# Patient Record
Sex: FEMALE | Race: WHITE | NOT HISPANIC OR LATINO | URBAN - METROPOLITAN AREA
[De-identification: names, ages, dates, MRNs, and addresses within clinical notes are randomized per-mention and may not be internally consistent; named-entity substitution may affect disease eponyms.]

---

## 2017-01-10 ENCOUNTER — COMMUNICATION - HEALTHEAST (OUTPATIENT)
Dept: INTERNAL MEDICINE | Facility: CLINIC | Age: 82
End: 2017-01-10

## 2017-01-10 DIAGNOSIS — R19.7 DIARRHEA: ICD-10-CM

## 2017-01-12 ENCOUNTER — COMMUNICATION - HEALTHEAST (OUTPATIENT)
Dept: INTERNAL MEDICINE | Facility: CLINIC | Age: 82
End: 2017-01-12

## 2017-01-18 ENCOUNTER — RECORDS - HEALTHEAST (OUTPATIENT)
Dept: ADMINISTRATIVE | Facility: OTHER | Age: 82
End: 2017-01-18

## 2017-01-31 ENCOUNTER — COMMUNICATION - HEALTHEAST (OUTPATIENT)
Dept: INTERNAL MEDICINE | Facility: CLINIC | Age: 82
End: 2017-01-31

## 2017-02-15 ENCOUNTER — OFFICE VISIT - HEALTHEAST (OUTPATIENT)
Dept: INTERNAL MEDICINE | Facility: CLINIC | Age: 82
End: 2017-02-15

## 2017-02-15 DIAGNOSIS — E78.5 HYPERLIPIDEMIA: ICD-10-CM

## 2017-02-15 DIAGNOSIS — M43.6 NECK STIFFNESS: ICD-10-CM

## 2017-02-15 DIAGNOSIS — I10 ESSENTIAL HYPERTENSION: ICD-10-CM

## 2017-02-15 DIAGNOSIS — M48.061 SPINAL STENOSIS OF LUMBAR REGION WITH RADICULOPATHY: ICD-10-CM

## 2017-02-15 DIAGNOSIS — R29.898 LOWER EXTREMITY WEAKNESS: ICD-10-CM

## 2017-02-15 DIAGNOSIS — I25.10 CORONARY ATHEROSCLEROSIS: ICD-10-CM

## 2017-02-15 DIAGNOSIS — M54.16 SPINAL STENOSIS OF LUMBAR REGION WITH RADICULOPATHY: ICD-10-CM

## 2017-02-15 LAB
CHOLEST SERPL-MCNC: 204 MG/DL
FASTING STATUS PATIENT QL REPORTED: YES
HDLC SERPL-MCNC: 76 MG/DL
LDLC SERPL CALC-MCNC: 116 MG/DL
TRIGL SERPL-MCNC: 58 MG/DL

## 2017-02-15 ASSESSMENT — MIFFLIN-ST. JEOR: SCORE: 1128.28

## 2017-02-16 ENCOUNTER — AMBULATORY - HEALTHEAST (OUTPATIENT)
Dept: INTERNAL MEDICINE | Facility: CLINIC | Age: 82
End: 2017-02-16

## 2017-02-16 ENCOUNTER — COMMUNICATION - HEALTHEAST (OUTPATIENT)
Dept: INTERNAL MEDICINE | Facility: CLINIC | Age: 82
End: 2017-02-16

## 2017-02-16 DIAGNOSIS — E87.6 HYPOKALEMIA: ICD-10-CM

## 2017-02-17 ENCOUNTER — COMMUNICATION - HEALTHEAST (OUTPATIENT)
Dept: PHYSICAL MEDICINE AND REHAB | Facility: CLINIC | Age: 82
End: 2017-02-17

## 2017-03-02 ENCOUNTER — HOSPITAL ENCOUNTER (OUTPATIENT)
Dept: PHYSICAL MEDICINE AND REHAB | Facility: CLINIC | Age: 82
Discharge: HOME | End: 2017-03-02
Attending: PHYSICIAN ASSISTANT

## 2017-03-02 DIAGNOSIS — M54.16 LUMBAR RADICULITIS: ICD-10-CM

## 2017-03-29 ENCOUNTER — COMMUNICATION - HEALTHEAST (OUTPATIENT)
Dept: PHYSICAL MEDICINE AND REHAB | Facility: CLINIC | Age: 82
End: 2017-03-29

## 2017-04-09 ENCOUNTER — COMMUNICATION - HEALTHEAST (OUTPATIENT)
Dept: PHYSICAL MEDICINE AND REHAB | Facility: CLINIC | Age: 82
End: 2017-04-09

## 2017-04-10 ENCOUNTER — AMBULATORY - HEALTHEAST (OUTPATIENT)
Dept: PHYSICAL MEDICINE AND REHAB | Facility: CLINIC | Age: 82
End: 2017-04-10

## 2017-04-10 DIAGNOSIS — M54.16 LUMBAR RADICULITIS: ICD-10-CM

## 2017-04-14 ENCOUNTER — AMBULATORY - HEALTHEAST (OUTPATIENT)
Dept: PHYSICAL MEDICINE AND REHAB | Facility: CLINIC | Age: 82
End: 2017-04-14

## 2017-04-16 ENCOUNTER — COMMUNICATION - HEALTHEAST (OUTPATIENT)
Dept: PHYSICAL MEDICINE AND REHAB | Facility: CLINIC | Age: 82
End: 2017-04-16

## 2017-04-17 ENCOUNTER — HOSPITAL ENCOUNTER (OUTPATIENT)
Dept: PHYSICAL MEDICINE AND REHAB | Facility: CLINIC | Age: 82
Discharge: HOME | End: 2017-04-17
Attending: PHYSICIAN ASSISTANT

## 2017-04-17 DIAGNOSIS — M54.16 LUMBAR RADICULITIS: ICD-10-CM

## 2017-04-17 ASSESSMENT — MIFFLIN-ST. JEOR: SCORE: 1131.24

## 2017-05-03 ENCOUNTER — OFFICE VISIT - HEALTHEAST (OUTPATIENT)
Dept: CARDIOLOGY | Facility: CLINIC | Age: 82
End: 2017-05-03

## 2017-05-03 DIAGNOSIS — E78.00 HYPERCHOLESTEREMIA: ICD-10-CM

## 2017-05-03 DIAGNOSIS — I25.83 CORONARY ATHEROSCLEROSIS DUE TO LIPID RICH PLAQUE: ICD-10-CM

## 2017-05-03 DIAGNOSIS — M48.061 SPINAL STENOSIS OF LUMBAR REGION WITH RADICULOPATHY: ICD-10-CM

## 2017-05-03 DIAGNOSIS — I10 ESSENTIAL HYPERTENSION WITH GOAL BLOOD PRESSURE LESS THAN 140/90: ICD-10-CM

## 2017-05-03 DIAGNOSIS — I47.10 PAROXYSMAL SUPRAVENTRICULAR TACHYCARDIA (H): ICD-10-CM

## 2017-05-03 DIAGNOSIS — M54.16 SPINAL STENOSIS OF LUMBAR REGION WITH RADICULOPATHY: ICD-10-CM

## 2017-05-03 LAB
ATRIAL RATE - MUSE: 85 BPM
DIASTOLIC BLOOD PRESSURE - MUSE: NORMAL MMHG
INTERPRETATION ECG - MUSE: NORMAL
P AXIS - MUSE: 46 DEGREES
PR INTERVAL - MUSE: 244 MS
QRS DURATION - MUSE: 142 MS
QT - MUSE: 404 MS
QTC - MUSE: 480 MS
R AXIS - MUSE: -57 DEGREES
SYSTOLIC BLOOD PRESSURE - MUSE: NORMAL MMHG
T AXIS - MUSE: 98 DEGREES
VENTRICULAR RATE- MUSE: 85 BPM

## 2017-05-03 ASSESSMENT — MIFFLIN-ST. JEOR: SCORE: 1143.03

## 2017-05-05 ENCOUNTER — COMMUNICATION - HEALTHEAST (OUTPATIENT)
Dept: NEUROSURGERY | Facility: CLINIC | Age: 82
End: 2017-05-05

## 2017-05-05 ENCOUNTER — COMMUNICATION - HEALTHEAST (OUTPATIENT)
Dept: PHYSICAL MEDICINE AND REHAB | Facility: CLINIC | Age: 82
End: 2017-05-05

## 2017-05-07 ENCOUNTER — COMMUNICATION - HEALTHEAST (OUTPATIENT)
Dept: INTERNAL MEDICINE | Facility: CLINIC | Age: 82
End: 2017-05-07

## 2017-05-07 DIAGNOSIS — I10 HTN (HYPERTENSION): ICD-10-CM

## 2017-05-09 ENCOUNTER — COMMUNICATION - HEALTHEAST (OUTPATIENT)
Dept: NEUROSURGERY | Facility: CLINIC | Age: 82
End: 2017-05-09

## 2017-05-15 ENCOUNTER — OFFICE VISIT - HEALTHEAST (OUTPATIENT)
Dept: NEUROSURGERY | Facility: CLINIC | Age: 82
End: 2017-05-15

## 2017-05-15 ENCOUNTER — AMBULATORY - HEALTHEAST (OUTPATIENT)
Dept: NEUROSURGERY | Facility: CLINIC | Age: 82
End: 2017-05-15

## 2017-05-15 DIAGNOSIS — M54.10 RADICULAR SYNDROME OF LEFT LEG: ICD-10-CM

## 2017-05-15 ASSESSMENT — MIFFLIN-ST. JEOR: SCORE: 1143.03

## 2017-05-19 ENCOUNTER — OFFICE VISIT - HEALTHEAST (OUTPATIENT)
Dept: INTERNAL MEDICINE | Facility: CLINIC | Age: 82
End: 2017-05-19

## 2017-05-19 ENCOUNTER — COMMUNICATION - HEALTHEAST (OUTPATIENT)
Dept: INTERNAL MEDICINE | Facility: CLINIC | Age: 82
End: 2017-05-19

## 2017-05-19 DIAGNOSIS — M54.16 SPINAL STENOSIS OF LUMBAR REGION WITH RADICULOPATHY: ICD-10-CM

## 2017-05-19 DIAGNOSIS — M89.9 DISORDER OF BONE AND CARTILAGE: ICD-10-CM

## 2017-05-19 DIAGNOSIS — E78.00 HYPERCHOLESTEREMIA: ICD-10-CM

## 2017-05-19 DIAGNOSIS — N32.81 OAB (OVERACTIVE BLADDER): ICD-10-CM

## 2017-05-19 DIAGNOSIS — M94.9 DISORDER OF BONE AND CARTILAGE: ICD-10-CM

## 2017-05-19 DIAGNOSIS — M48.061 SPINAL STENOSIS OF LUMBAR REGION WITH RADICULOPATHY: ICD-10-CM

## 2017-05-19 DIAGNOSIS — I10 ESSENTIAL HYPERTENSION WITH GOAL BLOOD PRESSURE LESS THAN 140/90: ICD-10-CM

## 2017-05-19 LAB
CHOLEST SERPL-MCNC: 224 MG/DL
FASTING STATUS PATIENT QL REPORTED: YES
HDLC SERPL-MCNC: 82 MG/DL
LDLC SERPL CALC-MCNC: 131 MG/DL
TRIGL SERPL-MCNC: 56 MG/DL

## 2017-05-19 ASSESSMENT — MIFFLIN-ST. JEOR: SCORE: 1129.42

## 2017-05-23 ENCOUNTER — COMMUNICATION - HEALTHEAST (OUTPATIENT)
Dept: INTERNAL MEDICINE | Facility: CLINIC | Age: 82
End: 2017-05-23

## 2017-05-24 ENCOUNTER — AMBULATORY - HEALTHEAST (OUTPATIENT)
Dept: NEUROSURGERY | Facility: CLINIC | Age: 82
End: 2017-05-24

## 2017-05-30 ENCOUNTER — COMMUNICATION - HEALTHEAST (OUTPATIENT)
Dept: INTERNAL MEDICINE | Facility: CLINIC | Age: 82
End: 2017-05-30

## 2017-05-30 DIAGNOSIS — N32.81 OVERACTIVE BLADDER: ICD-10-CM

## 2017-05-31 ENCOUNTER — HOSPITAL ENCOUNTER (OUTPATIENT)
Dept: MRI IMAGING | Facility: CLINIC | Age: 82
Discharge: HOME | End: 2017-05-31
Attending: NEUROLOGICAL SURGERY

## 2017-05-31 DIAGNOSIS — M54.10 RADICULAR SYNDROME OF LEFT LEG: ICD-10-CM

## 2017-06-06 ENCOUNTER — RECORDS - HEALTHEAST (OUTPATIENT)
Dept: BONE DENSITY | Facility: CLINIC | Age: 82
End: 2017-06-06

## 2017-06-06 ENCOUNTER — RECORDS - HEALTHEAST (OUTPATIENT)
Dept: ADMINISTRATIVE | Facility: OTHER | Age: 82
End: 2017-06-06

## 2017-06-06 DIAGNOSIS — M94.9 DISORDER OF CARTILAGE, UNSPECIFIED: ICD-10-CM

## 2017-06-06 DIAGNOSIS — M89.9 DISORDER OF BONE, UNSPECIFIED: ICD-10-CM

## 2017-06-08 ENCOUNTER — COMMUNICATION - HEALTHEAST (OUTPATIENT)
Dept: INTERNAL MEDICINE | Facility: CLINIC | Age: 82
End: 2017-06-08

## 2017-06-12 ENCOUNTER — OFFICE VISIT - HEALTHEAST (OUTPATIENT)
Dept: NEUROSURGERY | Facility: CLINIC | Age: 82
End: 2017-06-12

## 2017-06-12 ENCOUNTER — AMBULATORY - HEALTHEAST (OUTPATIENT)
Dept: NEUROSURGERY | Facility: CLINIC | Age: 82
End: 2017-06-12

## 2017-06-12 DIAGNOSIS — S34.21XS INJURY OF SPINAL NERVE ROOT AT L5 LEVEL, SEQUELA: ICD-10-CM

## 2017-06-13 ENCOUNTER — COMMUNICATION - HEALTHEAST (OUTPATIENT)
Dept: NEUROSURGERY | Facility: CLINIC | Age: 82
End: 2017-06-13

## 2017-06-13 ENCOUNTER — COMMUNICATION - HEALTHEAST (OUTPATIENT)
Dept: INTERNAL MEDICINE | Facility: CLINIC | Age: 82
End: 2017-06-13

## 2017-06-14 ENCOUNTER — AMBULATORY - HEALTHEAST (OUTPATIENT)
Dept: NEUROSURGERY | Facility: CLINIC | Age: 82
End: 2017-06-14

## 2017-06-14 DIAGNOSIS — M54.9 BACK PAIN: ICD-10-CM

## 2017-06-16 ENCOUNTER — HOSPITAL ENCOUNTER (OUTPATIENT)
Dept: RADIOLOGY | Facility: CLINIC | Age: 82
Discharge: HOME | End: 2017-06-16
Attending: NEUROLOGICAL SURGERY

## 2017-06-16 DIAGNOSIS — M54.9 BACK PAIN: ICD-10-CM

## 2017-06-19 ENCOUNTER — AMBULATORY - HEALTHEAST (OUTPATIENT)
Dept: PHYSICAL MEDICINE AND REHAB | Facility: CLINIC | Age: 82
End: 2017-06-19

## 2017-06-20 ENCOUNTER — OFFICE VISIT - HEALTHEAST (OUTPATIENT)
Dept: NEUROSURGERY | Facility: CLINIC | Age: 82
End: 2017-06-20

## 2017-06-20 ENCOUNTER — AMBULATORY - HEALTHEAST (OUTPATIENT)
Dept: NEUROSURGERY | Facility: CLINIC | Age: 82
End: 2017-06-20

## 2017-06-20 ENCOUNTER — COMMUNICATION - HEALTHEAST (OUTPATIENT)
Dept: NEUROSURGERY | Facility: CLINIC | Age: 82
End: 2017-06-20

## 2017-06-20 DIAGNOSIS — M54.16 LEFT LUMBAR RADICULOPATHY: ICD-10-CM

## 2017-06-20 DIAGNOSIS — M41.55 OTHER SECONDARY SCOLIOSIS, THORACOLUMBAR REGION: ICD-10-CM

## 2017-06-20 ASSESSMENT — MIFFLIN-ST. JEOR: SCORE: 1140.53

## 2017-06-26 ENCOUNTER — HOSPITAL ENCOUNTER (OUTPATIENT)
Dept: PHYSICAL MEDICINE AND REHAB | Facility: CLINIC | Age: 82
Discharge: HOME | End: 2017-06-26
Attending: NEUROLOGICAL SURGERY

## 2017-06-26 DIAGNOSIS — M48.061 LUMBAR FORAMINAL STENOSIS: ICD-10-CM

## 2017-06-26 DIAGNOSIS — M54.42 LOW BACK PAIN WITH LEFT-SIDED SCIATICA: ICD-10-CM

## 2017-06-26 DIAGNOSIS — M41.9 SCOLIOSIS: ICD-10-CM

## 2017-06-26 DIAGNOSIS — S34.21XS INJURY OF SPINAL NERVE ROOT AT L5 LEVEL, SEQUELA: ICD-10-CM

## 2017-07-10 ENCOUNTER — COMMUNICATION - HEALTHEAST (OUTPATIENT)
Dept: PHYSICAL MEDICINE AND REHAB | Facility: CLINIC | Age: 82
End: 2017-07-10

## 2017-08-13 ENCOUNTER — COMMUNICATION - HEALTHEAST (OUTPATIENT)
Dept: INTERNAL MEDICINE | Facility: CLINIC | Age: 82
End: 2017-08-13

## 2017-08-14 ENCOUNTER — COMMUNICATION - HEALTHEAST (OUTPATIENT)
Dept: INTERNAL MEDICINE | Facility: CLINIC | Age: 82
End: 2017-08-14

## 2017-08-14 ENCOUNTER — OFFICE VISIT - HEALTHEAST (OUTPATIENT)
Dept: INTERNAL MEDICINE | Facility: CLINIC | Age: 82
End: 2017-08-14

## 2017-08-14 DIAGNOSIS — M54.16 SPINAL STENOSIS OF LUMBAR REGION WITH RADICULOPATHY: ICD-10-CM

## 2017-08-14 DIAGNOSIS — I25.83 CORONARY ATHEROSCLEROSIS DUE TO LIPID RICH PLAQUE: ICD-10-CM

## 2017-08-14 DIAGNOSIS — R29.898 WEAKNESS OF BOTH LOWER EXTREMITIES: ICD-10-CM

## 2017-08-14 DIAGNOSIS — I10 ESSENTIAL HYPERTENSION WITH GOAL BLOOD PRESSURE LESS THAN 140/90: ICD-10-CM

## 2017-08-14 DIAGNOSIS — K21.9 GERD (GASTROESOPHAGEAL REFLUX DISEASE): ICD-10-CM

## 2017-08-14 DIAGNOSIS — I82.409 ACUTE THROMBOEMBOLISM OF DEEP VEINS OF LOWER EXTREMITY (H): ICD-10-CM

## 2017-08-14 DIAGNOSIS — M48.061 SPINAL STENOSIS OF LUMBAR REGION WITH RADICULOPATHY: ICD-10-CM

## 2017-08-14 DIAGNOSIS — I26.99 OTHER ACUTE PULMONARY EMBOLISM WITHOUT ACUTE COR PULMONALE (H): ICD-10-CM

## 2017-08-15 ENCOUNTER — RECORDS - HEALTHEAST (OUTPATIENT)
Dept: ADMINISTRATIVE | Facility: OTHER | Age: 82
End: 2017-08-15

## 2017-08-17 ENCOUNTER — COMMUNICATION - HEALTHEAST (OUTPATIENT)
Dept: INTERNAL MEDICINE | Facility: CLINIC | Age: 82
End: 2017-08-17

## 2017-08-18 ENCOUNTER — OFFICE VISIT - HEALTHEAST (OUTPATIENT)
Dept: INTERNAL MEDICINE | Facility: CLINIC | Age: 82
End: 2017-08-18

## 2017-08-18 DIAGNOSIS — I26.99 OTHER ACUTE PULMONARY EMBOLISM WITHOUT ACUTE COR PULMONALE (H): ICD-10-CM

## 2017-08-18 DIAGNOSIS — R63.0 LOSS OF APPETITE: ICD-10-CM

## 2017-08-18 DIAGNOSIS — I25.83 CORONARY ATHEROSCLEROSIS DUE TO LIPID RICH PLAQUE: ICD-10-CM

## 2017-08-18 ASSESSMENT — MIFFLIN-ST. JEOR: SCORE: 1133.96

## 2017-08-25 ENCOUNTER — OFFICE VISIT - HEALTHEAST (OUTPATIENT)
Dept: INTERNAL MEDICINE | Facility: CLINIC | Age: 82
End: 2017-08-25

## 2017-08-25 ENCOUNTER — COMMUNICATION - HEALTHEAST (OUTPATIENT)
Dept: NURSING | Facility: CLINIC | Age: 82
End: 2017-08-25

## 2017-08-25 DIAGNOSIS — E78.00 HYPERCHOLESTEREMIA: ICD-10-CM

## 2017-08-25 DIAGNOSIS — M94.9 DISORDER OF BONE AND CARTILAGE: ICD-10-CM

## 2017-08-25 DIAGNOSIS — M89.9 DISORDER OF BONE AND CARTILAGE: ICD-10-CM

## 2017-08-25 DIAGNOSIS — I82.402 ACUTE THROMBOEMBOLISM OF DEEP VEINS OF LOWER EXTREMITY, LEFT (H): ICD-10-CM

## 2017-08-25 DIAGNOSIS — Z79.01 LONG TERM (CURRENT) USE OF ANTICOAGULANTS: ICD-10-CM

## 2017-08-25 DIAGNOSIS — I10 ESSENTIAL HYPERTENSION WITH GOAL BLOOD PRESSURE LESS THAN 140/90: ICD-10-CM

## 2017-08-25 DIAGNOSIS — I26.99 OTHER ACUTE PULMONARY EMBOLISM WITHOUT ACUTE COR PULMONALE (H): ICD-10-CM

## 2017-08-25 DIAGNOSIS — I26.99 PULMONARY EMBOLISM (H): ICD-10-CM

## 2017-08-25 LAB
CHOLEST SERPL-MCNC: 194 MG/DL
FASTING STATUS PATIENT QL REPORTED: YES
HDLC SERPL-MCNC: 56 MG/DL
LDLC SERPL CALC-MCNC: 117 MG/DL
TRIGL SERPL-MCNC: 107 MG/DL

## 2017-08-28 ENCOUNTER — COMMUNICATION - HEALTHEAST (OUTPATIENT)
Dept: INTERNAL MEDICINE | Facility: CLINIC | Age: 82
End: 2017-08-28

## 2017-09-01 ENCOUNTER — AMBULATORY - HEALTHEAST (OUTPATIENT)
Dept: LAB | Facility: CLINIC | Age: 82
End: 2017-09-01

## 2017-09-01 ENCOUNTER — COMMUNICATION - HEALTHEAST (OUTPATIENT)
Dept: NURSING | Facility: CLINIC | Age: 82
End: 2017-09-01

## 2017-09-01 DIAGNOSIS — I26.99 OTHER ACUTE PULMONARY EMBOLISM WITHOUT ACUTE COR PULMONALE (H): ICD-10-CM

## 2017-09-01 DIAGNOSIS — Z79.01 LONG TERM (CURRENT) USE OF ANTICOAGULANTS: ICD-10-CM

## 2017-09-01 DIAGNOSIS — I82.402 ACUTE THROMBOEMBOLISM OF DEEP VEINS OF LOWER EXTREMITY, LEFT (H): ICD-10-CM

## 2017-09-06 ENCOUNTER — COMMUNICATION - HEALTHEAST (OUTPATIENT)
Dept: INTERNAL MEDICINE | Facility: CLINIC | Age: 82
End: 2017-09-06

## 2017-09-06 DIAGNOSIS — I26.99 OTHER ACUTE PULMONARY EMBOLISM WITHOUT ACUTE COR PULMONALE (H): ICD-10-CM

## 2017-09-18 ENCOUNTER — OFFICE VISIT - HEALTHEAST (OUTPATIENT)
Dept: INTERNAL MEDICINE | Facility: CLINIC | Age: 82
End: 2017-09-18

## 2017-09-18 ENCOUNTER — COMMUNICATION - HEALTHEAST (OUTPATIENT)
Dept: NURSING | Facility: CLINIC | Age: 82
End: 2017-09-18

## 2017-09-18 ENCOUNTER — COMMUNICATION - HEALTHEAST (OUTPATIENT)
Dept: PHYSICAL MEDICINE AND REHAB | Facility: CLINIC | Age: 82
End: 2017-09-18

## 2017-09-18 DIAGNOSIS — R29.898 WEAKNESS OF BOTH LOWER EXTREMITIES: ICD-10-CM

## 2017-09-18 DIAGNOSIS — M48.061 SPINAL STENOSIS OF LUMBAR REGION WITH RADICULOPATHY: ICD-10-CM

## 2017-09-18 DIAGNOSIS — Z79.01 LONG TERM (CURRENT) USE OF ANTICOAGULANTS: ICD-10-CM

## 2017-09-18 DIAGNOSIS — M54.16 SPINAL STENOSIS OF LUMBAR REGION WITH RADICULOPATHY: ICD-10-CM

## 2017-09-18 DIAGNOSIS — R21 SKIN RASH: ICD-10-CM

## 2017-09-18 DIAGNOSIS — E78.00 HYPERCHOLESTEREMIA: ICD-10-CM

## 2017-09-18 DIAGNOSIS — I26.99 OTHER ACUTE PULMONARY EMBOLISM WITHOUT ACUTE COR PULMONALE (H): ICD-10-CM

## 2017-09-18 DIAGNOSIS — I26.99 PULMONARY EMBOLISM (H): ICD-10-CM

## 2017-09-18 DIAGNOSIS — I10 ESSENTIAL HYPERTENSION: ICD-10-CM

## 2017-09-18 DIAGNOSIS — I82.402 ACUTE THROMBOEMBOLISM OF DEEP VEINS OF LOWER EXTREMITY, LEFT (H): ICD-10-CM

## 2017-09-18 ASSESSMENT — MIFFLIN-ST. JEOR: SCORE: 1152.1

## 2017-09-21 ENCOUNTER — COMMUNICATION - HEALTHEAST (OUTPATIENT)
Dept: ONCOLOGY | Facility: CLINIC | Age: 82
End: 2017-09-21

## 2017-09-25 ENCOUNTER — COMMUNICATION - HEALTHEAST (OUTPATIENT)
Dept: PHYSICAL MEDICINE AND REHAB | Facility: CLINIC | Age: 82
End: 2017-09-25

## 2017-09-27 ENCOUNTER — RECORDS - HEALTHEAST (OUTPATIENT)
Dept: ADMINISTRATIVE | Facility: OTHER | Age: 82
End: 2017-09-27

## 2017-10-02 ENCOUNTER — OFFICE VISIT - HEALTHEAST (OUTPATIENT)
Dept: ONCOLOGY | Facility: CLINIC | Age: 82
End: 2017-10-02

## 2017-10-02 ENCOUNTER — AMBULATORY - HEALTHEAST (OUTPATIENT)
Dept: LAB | Facility: CLINIC | Age: 82
End: 2017-10-02

## 2017-10-02 ENCOUNTER — COMMUNICATION - HEALTHEAST (OUTPATIENT)
Dept: NURSING | Facility: CLINIC | Age: 82
End: 2017-10-02

## 2017-10-02 DIAGNOSIS — Z79.01 LONG-TERM (CURRENT) USE OF ANTICOAGULANTS: ICD-10-CM

## 2017-10-02 DIAGNOSIS — I26.99 OTHER ACUTE PULMONARY EMBOLISM WITHOUT ACUTE COR PULMONALE (H): ICD-10-CM

## 2017-10-02 DIAGNOSIS — Z79.01 LONG TERM CURRENT USE OF ANTICOAGULANT THERAPY: ICD-10-CM

## 2017-10-02 DIAGNOSIS — I82.402 ACUTE THROMBOEMBOLISM OF DEEP VEINS OF LOWER EXTREMITY, LEFT (H): ICD-10-CM

## 2017-10-02 ASSESSMENT — MIFFLIN-ST. JEOR: SCORE: 1164.35

## 2017-10-16 ENCOUNTER — COMMUNICATION - HEALTHEAST (OUTPATIENT)
Dept: NURSING | Facility: CLINIC | Age: 82
End: 2017-10-16

## 2017-10-16 ENCOUNTER — AMBULATORY - HEALTHEAST (OUTPATIENT)
Dept: LAB | Facility: CLINIC | Age: 82
End: 2017-10-16

## 2017-10-16 DIAGNOSIS — I26.99 OTHER ACUTE PULMONARY EMBOLISM WITHOUT ACUTE COR PULMONALE (H): ICD-10-CM

## 2017-10-16 DIAGNOSIS — Z79.01 LONG TERM CURRENT USE OF ANTICOAGULANT THERAPY: ICD-10-CM

## 2017-10-16 DIAGNOSIS — I82.402 ACUTE THROMBOEMBOLISM OF DEEP VEINS OF LOWER EXTREMITY, LEFT (H): ICD-10-CM

## 2017-10-19 ENCOUNTER — HOSPITAL ENCOUNTER (OUTPATIENT)
Dept: PHYSICAL MEDICINE AND REHAB | Facility: CLINIC | Age: 82
Discharge: HOME | End: 2017-10-19
Attending: PHYSICAL MEDICINE & REHABILITATION

## 2017-10-19 DIAGNOSIS — M54.16 LUMBAR RADICULAR PAIN: ICD-10-CM

## 2017-10-19 DIAGNOSIS — Z79.01 ON WARFARIN THERAPY: ICD-10-CM

## 2017-10-26 ENCOUNTER — COMMUNICATION - HEALTHEAST (OUTPATIENT)
Dept: PHYSICAL MEDICINE AND REHAB | Facility: CLINIC | Age: 82
End: 2017-10-26

## 2017-11-13 ENCOUNTER — AMBULATORY - HEALTHEAST (OUTPATIENT)
Dept: LAB | Facility: CLINIC | Age: 82
End: 2017-11-13

## 2017-11-13 ENCOUNTER — COMMUNICATION - HEALTHEAST (OUTPATIENT)
Dept: NURSING | Facility: CLINIC | Age: 82
End: 2017-11-13

## 2017-11-13 DIAGNOSIS — I82.402 ACUTE THROMBOEMBOLISM OF DEEP VEINS OF LOWER EXTREMITY, LEFT (H): ICD-10-CM

## 2017-11-13 DIAGNOSIS — I26.99 OTHER ACUTE PULMONARY EMBOLISM WITHOUT ACUTE COR PULMONALE (H): ICD-10-CM

## 2017-11-13 DIAGNOSIS — Z79.01 LONG TERM CURRENT USE OF ANTICOAGULANT THERAPY: ICD-10-CM

## 2017-12-19 ENCOUNTER — COMMUNICATION - HEALTHEAST (OUTPATIENT)
Dept: NURSING | Facility: CLINIC | Age: 82
End: 2017-12-19

## 2017-12-19 ENCOUNTER — OFFICE VISIT - HEALTHEAST (OUTPATIENT)
Dept: INTERNAL MEDICINE | Facility: CLINIC | Age: 82
End: 2017-12-19

## 2017-12-19 DIAGNOSIS — I10 ESSENTIAL HYPERTENSION: ICD-10-CM

## 2017-12-19 DIAGNOSIS — I26.99 OTHER ACUTE PULMONARY EMBOLISM WITHOUT ACUTE COR PULMONALE (H): ICD-10-CM

## 2017-12-19 DIAGNOSIS — E78.00 HYPERCHOLESTEREMIA: ICD-10-CM

## 2017-12-19 DIAGNOSIS — I82.402 ACUTE THROMBOEMBOLISM OF DEEP VEINS OF LOWER EXTREMITY, LEFT (H): ICD-10-CM

## 2017-12-19 DIAGNOSIS — N32.81 OVERACTIVE BLADDER: ICD-10-CM

## 2017-12-19 DIAGNOSIS — I65.8 OCCLUSION AND STENOSIS OF MULTIPLE AND BILATERAL PRECEREBRAL ARTERIES: ICD-10-CM

## 2017-12-19 DIAGNOSIS — I26.99 PULMONARY EMBOLISM (H): ICD-10-CM

## 2017-12-19 DIAGNOSIS — Z79.01 LONG TERM CURRENT USE OF ANTICOAGULANT THERAPY: ICD-10-CM

## 2017-12-19 LAB
CHOLEST SERPL-MCNC: 218 MG/DL
FASTING STATUS PATIENT QL REPORTED: YES
HDLC SERPL-MCNC: 68 MG/DL
LDLC SERPL CALC-MCNC: 134 MG/DL
TRIGL SERPL-MCNC: 80 MG/DL

## 2017-12-19 ASSESSMENT — MIFFLIN-ST. JEOR: SCORE: 1147.57

## 2017-12-21 ENCOUNTER — AMBULATORY - HEALTHEAST (OUTPATIENT)
Dept: INTERNAL MEDICINE | Facility: CLINIC | Age: 82
End: 2017-12-21

## 2017-12-21 ENCOUNTER — COMMUNICATION - HEALTHEAST (OUTPATIENT)
Dept: INTERNAL MEDICINE | Facility: CLINIC | Age: 82
End: 2017-12-21

## 2017-12-21 DIAGNOSIS — E87.6 HYPOKALEMIA: ICD-10-CM

## 2017-12-26 ENCOUNTER — COMMUNICATION - HEALTHEAST (OUTPATIENT)
Dept: INTERNAL MEDICINE | Facility: CLINIC | Age: 82
End: 2017-12-26

## 2017-12-26 DIAGNOSIS — Z79.01 LONG-TERM (CURRENT) USE OF ANTICOAGULANTS: ICD-10-CM

## 2017-12-26 DIAGNOSIS — I26.99 OTHER ACUTE PULMONARY EMBOLISM WITHOUT ACUTE COR PULMONALE (H): ICD-10-CM

## 2018-01-05 ENCOUNTER — COMMUNICATION - HEALTHEAST (OUTPATIENT)
Dept: INTERNAL MEDICINE | Facility: CLINIC | Age: 83
End: 2018-01-05

## 2018-01-08 ENCOUNTER — AMBULATORY - HEALTHEAST (OUTPATIENT)
Dept: LAB | Facility: CLINIC | Age: 83
End: 2018-01-08

## 2018-01-08 DIAGNOSIS — E87.6 HYPOKALEMIA: ICD-10-CM

## 2018-01-08 LAB — POTASSIUM BLD-SCNC: 3.4 MMOL/L (ref 3.5–5)

## 2018-01-10 ENCOUNTER — COMMUNICATION - HEALTHEAST (OUTPATIENT)
Dept: INTERNAL MEDICINE | Facility: CLINIC | Age: 83
End: 2018-01-10

## 2018-01-19 ENCOUNTER — RECORDS - HEALTHEAST (OUTPATIENT)
Dept: ADMINISTRATIVE | Facility: OTHER | Age: 83
End: 2018-01-19

## 2018-01-24 ENCOUNTER — COMMUNICATION - HEALTHEAST (OUTPATIENT)
Dept: PHYSICAL MEDICINE AND REHAB | Facility: CLINIC | Age: 83
End: 2018-01-24

## 2018-01-25 ENCOUNTER — COMMUNICATION - HEALTHEAST (OUTPATIENT)
Dept: PHYSICAL MEDICINE AND REHAB | Facility: CLINIC | Age: 83
End: 2018-01-25

## 2018-01-25 ENCOUNTER — AMBULATORY - HEALTHEAST (OUTPATIENT)
Dept: PHYSICAL MEDICINE AND REHAB | Facility: CLINIC | Age: 83
End: 2018-01-25

## 2018-01-25 DIAGNOSIS — M54.16 LUMBAR RADICULITIS: ICD-10-CM

## 2018-01-29 ENCOUNTER — COMMUNICATION - HEALTHEAST (OUTPATIENT)
Dept: INTERNAL MEDICINE | Facility: CLINIC | Age: 83
End: 2018-01-29

## 2018-01-30 ENCOUNTER — COMMUNICATION - HEALTHEAST (OUTPATIENT)
Dept: INTERNAL MEDICINE | Facility: CLINIC | Age: 83
End: 2018-01-30

## 2018-01-30 ENCOUNTER — AMBULATORY - HEALTHEAST (OUTPATIENT)
Dept: LAB | Facility: CLINIC | Age: 83
End: 2018-01-30

## 2018-01-30 ENCOUNTER — COMMUNICATION - HEALTHEAST (OUTPATIENT)
Dept: NURSING | Facility: CLINIC | Age: 83
End: 2018-01-30

## 2018-01-30 DIAGNOSIS — I26.99 OTHER ACUTE PULMONARY EMBOLISM WITHOUT ACUTE COR PULMONALE (H): ICD-10-CM

## 2018-01-30 DIAGNOSIS — I82.402 ACUTE THROMBOEMBOLISM OF DEEP VEINS OF LOWER EXTREMITY, LEFT (H): ICD-10-CM

## 2018-01-30 DIAGNOSIS — Z79.01 LONG TERM CURRENT USE OF ANTICOAGULANT THERAPY: ICD-10-CM

## 2018-01-30 DIAGNOSIS — I10 ESSENTIAL HYPERTENSION WITH GOAL BLOOD PRESSURE LESS THAN 140/90: ICD-10-CM

## 2018-01-30 LAB — INR PPP: 2.2 (ref 0.9–1.1)

## 2018-02-01 ENCOUNTER — HOSPITAL ENCOUNTER (OUTPATIENT)
Dept: PHYSICAL MEDICINE AND REHAB | Facility: CLINIC | Age: 83
Discharge: HOME | End: 2018-02-01
Attending: PHYSICAL MEDICINE & REHABILITATION

## 2018-02-01 DIAGNOSIS — M54.16 LUMBAR RADICULITIS: ICD-10-CM

## 2018-02-01 DIAGNOSIS — Z79.01 ON WARFARIN THERAPY: ICD-10-CM

## 2018-02-01 LAB — POC INR - HE - HISTORICAL: 2.3 (ref 0.9–1.1)

## 2018-02-26 ENCOUNTER — AMBULATORY - HEALTHEAST (OUTPATIENT)
Dept: LAB | Facility: CLINIC | Age: 83
End: 2018-02-26

## 2018-02-26 ENCOUNTER — COMMUNICATION - HEALTHEAST (OUTPATIENT)
Dept: NURSING | Facility: CLINIC | Age: 83
End: 2018-02-26

## 2018-02-26 DIAGNOSIS — I26.99 OTHER ACUTE PULMONARY EMBOLISM WITHOUT ACUTE COR PULMONALE (H): ICD-10-CM

## 2018-02-26 DIAGNOSIS — I82.402 ACUTE THROMBOEMBOLISM OF DEEP VEINS OF LOWER EXTREMITY, LEFT (H): ICD-10-CM

## 2018-02-26 DIAGNOSIS — Z79.01 LONG TERM CURRENT USE OF ANTICOAGULANT THERAPY: ICD-10-CM

## 2018-02-26 LAB — INR PPP: 2.8 (ref 0.9–1.1)

## 2018-04-05 ENCOUNTER — COMMUNICATION - HEALTHEAST (OUTPATIENT)
Dept: INTERNAL MEDICINE | Facility: CLINIC | Age: 83
End: 2018-04-05

## 2018-04-11 ENCOUNTER — COMMUNICATION - HEALTHEAST (OUTPATIENT)
Dept: ANTICOAGULATION | Facility: CLINIC | Age: 83
End: 2018-04-11

## 2018-04-11 ENCOUNTER — OFFICE VISIT - HEALTHEAST (OUTPATIENT)
Dept: INTERNAL MEDICINE | Facility: CLINIC | Age: 83
End: 2018-04-11

## 2018-04-11 DIAGNOSIS — I47.10 PAROXYSMAL SUPRAVENTRICULAR TACHYCARDIA (H): ICD-10-CM

## 2018-04-11 DIAGNOSIS — I26.99 OTHER ACUTE PULMONARY EMBOLISM WITHOUT ACUTE COR PULMONALE (H): ICD-10-CM

## 2018-04-11 DIAGNOSIS — E78.00 HYPERCHOLESTEREMIA: ICD-10-CM

## 2018-04-11 DIAGNOSIS — I26.99 PULMONARY EMBOLISM (H): ICD-10-CM

## 2018-04-11 DIAGNOSIS — R29.898 LOWER EXTREMITY WEAKNESS: ICD-10-CM

## 2018-04-11 DIAGNOSIS — H81.10 BPPV (BENIGN PAROXYSMAL POSITIONAL VERTIGO): ICD-10-CM

## 2018-04-11 DIAGNOSIS — I10 ESSENTIAL HYPERTENSION: ICD-10-CM

## 2018-04-11 DIAGNOSIS — Z79.01 LONG TERM CURRENT USE OF ANTICOAGULANT THERAPY: ICD-10-CM

## 2018-04-11 DIAGNOSIS — I82.402 ACUTE THROMBOEMBOLISM OF DEEP VEINS OF LOWER EXTREMITY, LEFT (H): ICD-10-CM

## 2018-04-11 DIAGNOSIS — M48.061 SPINAL STENOSIS OF LUMBAR REGION WITH RADICULOPATHY: ICD-10-CM

## 2018-04-11 DIAGNOSIS — M54.16 SPINAL STENOSIS OF LUMBAR REGION WITH RADICULOPATHY: ICD-10-CM

## 2018-04-11 LAB
ALBUMIN SERPL-MCNC: 3.5 G/DL (ref 3.5–5)
ALP SERPL-CCNC: 70 U/L (ref 45–120)
ALT SERPL W P-5'-P-CCNC: 22 U/L (ref 0–45)
ANION GAP SERPL CALCULATED.3IONS-SCNC: 13 MMOL/L (ref 5–18)
AST SERPL W P-5'-P-CCNC: 26 U/L (ref 0–40)
BILIRUB DIRECT SERPL-MCNC: 0.2 MG/DL
BILIRUB SERPL-MCNC: 0.6 MG/DL (ref 0–1)
BUN SERPL-MCNC: 24 MG/DL (ref 8–28)
CALCIUM SERPL-MCNC: 9.4 MG/DL (ref 8.5–10.5)
CHLORIDE BLD-SCNC: 103 MMOL/L (ref 98–107)
CHOLEST SERPL-MCNC: 239 MG/DL
CO2 SERPL-SCNC: 26 MMOL/L (ref 22–31)
CREAT SERPL-MCNC: 0.8 MG/DL (ref 0.6–1.1)
ERYTHROCYTE [DISTWIDTH] IN BLOOD BY AUTOMATED COUNT: 12.4 % (ref 11–14.5)
FASTING STATUS PATIENT QL REPORTED: YES
GFR SERPL CREATININE-BSD FRML MDRD: >60 ML/MIN/1.73M2
GLUCOSE BLD-MCNC: 93 MG/DL (ref 70–125)
HCT VFR BLD AUTO: 41.5 % (ref 35–47)
HDLC SERPL-MCNC: 72 MG/DL
HGB BLD-MCNC: 13.8 G/DL (ref 12–16)
INR PPP: 2.3 (ref 0.9–1.1)
LDLC SERPL CALC-MCNC: 149 MG/DL
MCH RBC QN AUTO: 30.5 PG (ref 27–34)
MCHC RBC AUTO-ENTMCNC: 33.3 G/DL (ref 32–36)
MCV RBC AUTO: 91 FL (ref 80–100)
PLATELET # BLD AUTO: 249 THOU/UL (ref 140–440)
PMV BLD AUTO: 7.8 FL (ref 7–10)
POTASSIUM BLD-SCNC: 3.5 MMOL/L (ref 3.5–5)
PROT SERPL-MCNC: 7.3 G/DL (ref 6–8)
RBC # BLD AUTO: 4.54 MILL/UL (ref 3.8–5.4)
SODIUM SERPL-SCNC: 142 MMOL/L (ref 136–145)
TRIGL SERPL-MCNC: 91 MG/DL
TSH SERPL DL<=0.005 MIU/L-ACNC: 4.44 UIU/ML (ref 0.3–5)
WBC: 3.9 THOU/UL (ref 4–11)

## 2018-04-11 ASSESSMENT — MIFFLIN-ST. JEOR: SCORE: 1170.24

## 2018-04-12 ENCOUNTER — COMMUNICATION - HEALTHEAST (OUTPATIENT)
Dept: INTERNAL MEDICINE | Facility: CLINIC | Age: 83
End: 2018-04-12

## 2018-04-27 ENCOUNTER — COMMUNICATION - HEALTHEAST (OUTPATIENT)
Dept: INTERNAL MEDICINE | Facility: CLINIC | Age: 83
End: 2018-04-27

## 2018-04-27 ENCOUNTER — COMMUNICATION - HEALTHEAST (OUTPATIENT)
Dept: PHYSICAL MEDICINE AND REHAB | Facility: CLINIC | Age: 83
End: 2018-04-27

## 2018-04-30 ENCOUNTER — COMMUNICATION - HEALTHEAST (OUTPATIENT)
Dept: PHYSICAL MEDICINE AND REHAB | Facility: CLINIC | Age: 83
End: 2018-04-30

## 2018-05-21 ENCOUNTER — HOSPITAL ENCOUNTER (OUTPATIENT)
Dept: PHYSICAL MEDICINE AND REHAB | Facility: CLINIC | Age: 83
Discharge: HOME | End: 2018-05-21
Attending: PHYSICAL MEDICINE & REHABILITATION

## 2018-05-21 DIAGNOSIS — Z79.01 ON WARFARIN THERAPY: ICD-10-CM

## 2018-05-21 DIAGNOSIS — M54.16 LUMBAR RADICULAR PAIN: ICD-10-CM

## 2018-05-21 LAB — POC INR - HE - HISTORICAL: 2.4 (ref 0.9–1.1)

## 2018-05-29 ENCOUNTER — COMMUNICATION - HEALTHEAST (OUTPATIENT)
Dept: INTERNAL MEDICINE | Facility: CLINIC | Age: 83
End: 2018-05-29

## 2018-05-29 DIAGNOSIS — I10 ESSENTIAL HYPERTENSION: ICD-10-CM

## 2018-05-30 ENCOUNTER — COMMUNICATION - HEALTHEAST (OUTPATIENT)
Dept: CARDIOLOGY | Facility: CLINIC | Age: 83
End: 2018-05-30

## 2018-05-30 ENCOUNTER — COMMUNICATION - HEALTHEAST (OUTPATIENT)
Dept: ANTICOAGULATION | Facility: CLINIC | Age: 83
End: 2018-05-30

## 2018-05-30 DIAGNOSIS — I26.99 OTHER ACUTE PULMONARY EMBOLISM WITHOUT ACUTE COR PULMONALE (H): ICD-10-CM

## 2018-07-10 ENCOUNTER — AMBULATORY - HEALTHEAST (OUTPATIENT)
Dept: LAB | Facility: CLINIC | Age: 83
End: 2018-07-10

## 2018-07-10 ENCOUNTER — COMMUNICATION - HEALTHEAST (OUTPATIENT)
Dept: ANTICOAGULATION | Facility: CLINIC | Age: 83
End: 2018-07-10

## 2018-07-10 DIAGNOSIS — I82.402 ACUTE THROMBOEMBOLISM OF DEEP VEINS OF LOWER EXTREMITY, LEFT (H): ICD-10-CM

## 2018-07-10 DIAGNOSIS — I26.99 OTHER ACUTE PULMONARY EMBOLISM WITHOUT ACUTE COR PULMONALE (H): ICD-10-CM

## 2018-07-10 DIAGNOSIS — Z79.01 LONG TERM CURRENT USE OF ANTICOAGULANT THERAPY: ICD-10-CM

## 2018-07-10 LAB — INR PPP: 3.1 (ref 0.9–1.1)

## 2018-07-24 ENCOUNTER — COMMUNICATION - HEALTHEAST (OUTPATIENT)
Dept: LAB | Facility: CLINIC | Age: 83
End: 2018-07-24

## 2018-07-24 DIAGNOSIS — I26.99 OTHER ACUTE PULMONARY EMBOLISM WITHOUT ACUTE COR PULMONALE (H): ICD-10-CM

## 2018-07-24 DIAGNOSIS — I82.402 ACUTE THROMBOEMBOLISM OF DEEP VEINS OF LEFT LOWER EXTREMITY (H): ICD-10-CM

## 2018-07-30 ENCOUNTER — AMBULATORY - HEALTHEAST (OUTPATIENT)
Dept: LAB | Facility: CLINIC | Age: 83
End: 2018-07-30

## 2018-07-30 ENCOUNTER — COMMUNICATION - HEALTHEAST (OUTPATIENT)
Dept: ANTICOAGULATION | Facility: CLINIC | Age: 83
End: 2018-07-30

## 2018-07-30 DIAGNOSIS — I26.99 OTHER ACUTE PULMONARY EMBOLISM WITHOUT ACUTE COR PULMONALE (H): ICD-10-CM

## 2018-07-30 DIAGNOSIS — I82.402 ACUTE THROMBOEMBOLISM OF DEEP VEINS OF LEFT LOWER EXTREMITY (H): ICD-10-CM

## 2018-07-30 LAB — INR PPP: 2 (ref 0.9–1.1)

## 2018-07-31 ENCOUNTER — OFFICE VISIT - HEALTHEAST (OUTPATIENT)
Dept: CARDIOLOGY | Facility: CLINIC | Age: 83
End: 2018-07-31

## 2018-07-31 ENCOUNTER — COMMUNICATION - HEALTHEAST (OUTPATIENT)
Dept: CARDIOLOGY | Facility: CLINIC | Age: 83
End: 2018-07-31

## 2018-07-31 DIAGNOSIS — E78.00 HYPERCHOLESTEREMIA: ICD-10-CM

## 2018-07-31 DIAGNOSIS — I25.83 CORONARY ATHEROSCLEROSIS DUE TO LIPID RICH PLAQUE: ICD-10-CM

## 2018-07-31 DIAGNOSIS — I26.99 OTHER ACUTE PULMONARY EMBOLISM WITHOUT ACUTE COR PULMONALE (H): ICD-10-CM

## 2018-07-31 DIAGNOSIS — I10 ESSENTIAL HYPERTENSION WITH GOAL BLOOD PRESSURE LESS THAN 140/90: ICD-10-CM

## 2018-07-31 ASSESSMENT — MIFFLIN-ST. JEOR: SCORE: 1174.78

## 2018-08-14 ENCOUNTER — HOSPITAL ENCOUNTER (OUTPATIENT)
Dept: NUCLEAR MEDICINE | Facility: CLINIC | Age: 83
Discharge: HOME | End: 2018-08-14
Attending: INTERNAL MEDICINE

## 2018-08-14 ENCOUNTER — HOSPITAL ENCOUNTER (OUTPATIENT)
Dept: CARDIOLOGY | Facility: CLINIC | Age: 83
Discharge: HOME | End: 2018-08-14
Attending: INTERNAL MEDICINE

## 2018-08-14 DIAGNOSIS — I25.83 CORONARY ATHEROSCLEROSIS DUE TO LIPID RICH PLAQUE: ICD-10-CM

## 2018-08-14 LAB
CV STRESS CURRENT BP HE: NORMAL
CV STRESS CURRENT HR HE: 63
CV STRESS CURRENT HR HE: 67
CV STRESS CURRENT HR HE: 79
CV STRESS CURRENT HR HE: 80
CV STRESS CURRENT HR HE: 81
CV STRESS CURRENT HR HE: 81
CV STRESS CURRENT HR HE: 82
CV STRESS CURRENT HR HE: 83
CV STRESS CURRENT HR HE: 85
CV STRESS CURRENT HR HE: 85
CV STRESS CURRENT HR HE: 86
CV STRESS CURRENT HR HE: 87
CV STRESS CURRENT HR HE: 89
CV STRESS DEVIATION TIME HE: NORMAL
CV STRESS ECHO PERCENT HR HE: NORMAL
CV STRESS EXERCISE STAGE HE: NORMAL
CV STRESS FINAL RESTING BP HE: NORMAL
CV STRESS FINAL RESTING HR HE: 80
CV STRESS MAX HR HE: 89
CV STRESS MAX TREADMILL GRADE HE: 0
CV STRESS MAX TREADMILL SPEED HE: 0
CV STRESS PEAK DIA BP HE: NORMAL
CV STRESS PEAK SYS BP HE: NORMAL
CV STRESS PHASE HE: NORMAL
CV STRESS PROTOCOL HE: NORMAL
CV STRESS RESTING PT POSITION HE: NORMAL
CV STRESS ST DEVIATION AMOUNT HE: NORMAL
CV STRESS ST DEVIATION ELEVATION HE: NORMAL
CV STRESS ST EVELATION AMOUNT HE: NORMAL
CV STRESS TEST TYPE HE: NORMAL
CV STRESS TOTAL STAGE TIME MIN 1 HE: NORMAL
NUC STRESS EJECTION FRACTION: 52 %
STRESS ECHO BASELINE BP: NORMAL
STRESS ECHO BASELINE HR: 79
STRESS ECHO CALCULATED PERCENT HR: 66 %
STRESS ECHO LAST STRESS BP: NORMAL
STRESS ECHO LAST STRESS HR: 85

## 2018-08-15 ENCOUNTER — COMMUNICATION - HEALTHEAST (OUTPATIENT)
Dept: ANTICOAGULATION | Facility: CLINIC | Age: 83
End: 2018-08-15

## 2018-08-15 ENCOUNTER — COMMUNICATION - HEALTHEAST (OUTPATIENT)
Dept: CARDIOLOGY | Facility: CLINIC | Age: 83
End: 2018-08-15

## 2018-08-15 DIAGNOSIS — I26.99 PULMONARY EMBOLISM (H): ICD-10-CM

## 2018-08-16 ENCOUNTER — AMBULATORY - HEALTHEAST (OUTPATIENT)
Dept: PHYSICAL MEDICINE AND REHAB | Facility: CLINIC | Age: 83
End: 2018-08-16

## 2018-08-16 ENCOUNTER — COMMUNICATION - HEALTHEAST (OUTPATIENT)
Dept: INTERNAL MEDICINE | Facility: CLINIC | Age: 83
End: 2018-08-16

## 2018-08-16 ENCOUNTER — AMBULATORY - HEALTHEAST (OUTPATIENT)
Dept: CARDIOLOGY | Facility: CLINIC | Age: 83
End: 2018-08-16

## 2018-08-16 ENCOUNTER — COMMUNICATION - HEALTHEAST (OUTPATIENT)
Dept: CARDIOLOGY | Facility: CLINIC | Age: 83
End: 2018-08-16

## 2018-08-16 ENCOUNTER — COMMUNICATION - HEALTHEAST (OUTPATIENT)
Dept: PHYSICAL MEDICINE AND REHAB | Facility: CLINIC | Age: 83
End: 2018-08-16

## 2018-08-16 DIAGNOSIS — M54.16 LUMBAR RADICULITIS: ICD-10-CM

## 2018-08-16 DIAGNOSIS — R94.39 ABNORMAL NUCLEAR STRESS TEST: ICD-10-CM

## 2018-08-17 ENCOUNTER — COMMUNICATION - HEALTHEAST (OUTPATIENT)
Dept: ANTICOAGULATION | Facility: CLINIC | Age: 83
End: 2018-08-17

## 2018-08-17 ENCOUNTER — SURGERY - HEALTHEAST (OUTPATIENT)
Dept: CARDIOLOGY | Facility: CLINIC | Age: 83
End: 2018-08-17

## 2018-08-17 ENCOUNTER — AMBULATORY - HEALTHEAST (OUTPATIENT)
Dept: INTERNAL MEDICINE | Facility: CLINIC | Age: 83
End: 2018-08-17

## 2018-08-17 ENCOUNTER — AMBULATORY - HEALTHEAST (OUTPATIENT)
Dept: LAB | Facility: CLINIC | Age: 83
End: 2018-08-17

## 2018-08-17 DIAGNOSIS — I26.99 OTHER ACUTE PULMONARY EMBOLISM WITHOUT ACUTE COR PULMONALE (H): ICD-10-CM

## 2018-08-17 DIAGNOSIS — I26.99 PULMONARY EMBOLISM (H): ICD-10-CM

## 2018-08-17 LAB — INR PPP: 1.2 (ref 0.9–1.1)

## 2018-08-20 ENCOUNTER — RECORDS - HEALTHEAST (OUTPATIENT)
Dept: GENERAL RADIOLOGY | Facility: CLINIC | Age: 83
End: 2018-08-20

## 2018-08-20 ENCOUNTER — OFFICE VISIT - HEALTHEAST (OUTPATIENT)
Dept: INTERNAL MEDICINE | Facility: CLINIC | Age: 83
End: 2018-08-20

## 2018-08-20 DIAGNOSIS — E78.00 HYPERCHOLESTEREMIA: ICD-10-CM

## 2018-08-20 DIAGNOSIS — K21.9 GASTRO-ESOPHAGEAL REFLUX DISEASE WITHOUT ESOPHAGITIS: ICD-10-CM

## 2018-08-20 DIAGNOSIS — Z01.818 PREOP GENERAL PHYSICAL EXAM: ICD-10-CM

## 2018-08-20 DIAGNOSIS — K21.9 GASTROESOPHAGEAL REFLUX DISEASE WITHOUT ESOPHAGITIS: ICD-10-CM

## 2018-08-20 DIAGNOSIS — I82.409 ACUTE THROMBOEMBOLISM OF DEEP VEINS OF LOWER EXTREMITY (H): ICD-10-CM

## 2018-08-20 DIAGNOSIS — E78.00 PURE HYPERCHOLESTEROLEMIA, UNSPECIFIED: ICD-10-CM

## 2018-08-20 DIAGNOSIS — M48.061 SPINAL STENOSIS, LUMBAR REGION WITHOUT NEUROGENIC CLAUDICATION: ICD-10-CM

## 2018-08-20 DIAGNOSIS — M54.16 SPINAL STENOSIS OF LUMBAR REGION WITH RADICULOPATHY: ICD-10-CM

## 2018-08-20 DIAGNOSIS — Z01.818 ENCOUNTER FOR OTHER PREPROCEDURAL EXAMINATION: ICD-10-CM

## 2018-08-20 DIAGNOSIS — R06.09 DOE (DYSPNEA ON EXERTION): ICD-10-CM

## 2018-08-20 DIAGNOSIS — R94.39 ABNORMAL RESULT OF OTHER CARDIOVASCULAR FUNCTION STUDY: ICD-10-CM

## 2018-08-20 DIAGNOSIS — R06.09 OTHER FORMS OF DYSPNEA: ICD-10-CM

## 2018-08-20 DIAGNOSIS — M54.16 RADICULOPATHY, LUMBAR REGION: ICD-10-CM

## 2018-08-20 DIAGNOSIS — I25.10 ATHEROSCLEROSIS OF NATIVE CORONARY ARTERY OF NATIVE HEART WITHOUT ANGINA PECTORIS: ICD-10-CM

## 2018-08-20 DIAGNOSIS — M48.061 SPINAL STENOSIS OF LUMBAR REGION WITH RADICULOPATHY: ICD-10-CM

## 2018-08-20 DIAGNOSIS — I25.10 ATHEROSCLEROTIC HEART DISEASE OF NATIVE CORONARY ARTERY WITHOUT ANGINA PECTORIS: ICD-10-CM

## 2018-08-20 DIAGNOSIS — I82.409 ACUTE EMBOLISM AND THROMBOSIS OF UNSPECIFIED DEEP VEINS OF UNSPECIFIED LOWER EXTREMITY (H): ICD-10-CM

## 2018-08-20 DIAGNOSIS — R94.39 ABNORMAL NUCLEAR STRESS TEST: ICD-10-CM

## 2018-08-20 LAB
ANION GAP SERPL CALCULATED.3IONS-SCNC: 10 MMOL/L (ref 5–18)
ATRIAL RATE - MUSE: 85 BPM
BUN SERPL-MCNC: 27 MG/DL (ref 8–28)
CALCIUM SERPL-MCNC: 10.2 MG/DL (ref 8.5–10.5)
CHLORIDE BLD-SCNC: 103 MMOL/L (ref 98–107)
CO2 SERPL-SCNC: 28 MMOL/L (ref 22–31)
CREAT SERPL-MCNC: 0.93 MG/DL (ref 0.6–1.1)
DIASTOLIC BLOOD PRESSURE - MUSE: NORMAL MMHG
ERYTHROCYTE [DISTWIDTH] IN BLOOD BY AUTOMATED COUNT: 11.9 % (ref 11–14.5)
GFR SERPL CREATININE-BSD FRML MDRD: 57 ML/MIN/1.73M2
GLUCOSE BLD-MCNC: 82 MG/DL (ref 70–125)
HCT VFR BLD AUTO: 44.7 % (ref 35–47)
HGB BLD-MCNC: 14.9 G/DL (ref 12–16)
INTERPRETATION ECG - MUSE: NORMAL
MCH RBC QN AUTO: 31.2 PG (ref 27–34)
MCHC RBC AUTO-ENTMCNC: 33.4 G/DL (ref 32–36)
MCV RBC AUTO: 93 FL (ref 80–100)
P AXIS - MUSE: 64 DEGREES
PLATELET # BLD AUTO: 270 THOU/UL (ref 140–440)
PMV BLD AUTO: 7.8 FL (ref 7–10)
POTASSIUM BLD-SCNC: 3.8 MMOL/L (ref 3.5–5)
PR INTERVAL - MUSE: 268 MS
QRS DURATION - MUSE: 144 MS
QT - MUSE: 394 MS
QTC - MUSE: 468 MS
R AXIS - MUSE: -43 DEGREES
RBC # BLD AUTO: 4.79 MILL/UL (ref 3.8–5.4)
SODIUM SERPL-SCNC: 141 MMOL/L (ref 136–145)
SYSTOLIC BLOOD PRESSURE - MUSE: NORMAL MMHG
T AXIS - MUSE: 125 DEGREES
VENTRICULAR RATE- MUSE: 85 BPM
WBC: 6.9 THOU/UL (ref 4–11)

## 2018-08-20 ASSESSMENT — MIFFLIN-ST. JEOR: SCORE: 1170.24

## 2018-08-23 ENCOUNTER — SURGERY - HEALTHEAST (OUTPATIENT)
Dept: CARDIOLOGY | Facility: CLINIC | Age: 83
End: 2018-08-23

## 2018-08-23 ASSESSMENT — MIFFLIN-ST. JEOR: SCORE: 1172.97

## 2018-08-24 ENCOUNTER — COMMUNICATION - HEALTHEAST (OUTPATIENT)
Dept: ANTICOAGULATION | Facility: CLINIC | Age: 83
End: 2018-08-24

## 2018-08-24 DIAGNOSIS — I26.99 OTHER ACUTE PULMONARY EMBOLISM WITHOUT ACUTE COR PULMONALE (H): ICD-10-CM

## 2018-08-30 ENCOUNTER — COMMUNICATION - HEALTHEAST (OUTPATIENT)
Dept: ANTICOAGULATION | Facility: CLINIC | Age: 83
End: 2018-08-30

## 2018-08-30 ENCOUNTER — AMBULATORY - HEALTHEAST (OUTPATIENT)
Dept: LAB | Facility: CLINIC | Age: 83
End: 2018-08-30

## 2018-08-30 ENCOUNTER — COMMUNICATION - HEALTHEAST (OUTPATIENT)
Dept: CARDIOLOGY | Facility: CLINIC | Age: 83
End: 2018-08-30

## 2018-08-30 ENCOUNTER — COMMUNICATION - HEALTHEAST (OUTPATIENT)
Dept: INTERNAL MEDICINE | Facility: CLINIC | Age: 83
End: 2018-08-30

## 2018-08-30 DIAGNOSIS — I26.99 PULMONARY EMBOLISM (H): ICD-10-CM

## 2018-08-30 DIAGNOSIS — I26.99 OTHER ACUTE PULMONARY EMBOLISM WITHOUT ACUTE COR PULMONALE (H): ICD-10-CM

## 2018-08-30 LAB — INR PPP: 1.7 (ref 0.9–1.1)

## 2018-09-04 ENCOUNTER — OFFICE VISIT - HEALTHEAST (OUTPATIENT)
Dept: CARDIOLOGY | Facility: CLINIC | Age: 83
End: 2018-09-04

## 2018-09-04 DIAGNOSIS — R06.00 DYSPNEA: ICD-10-CM

## 2018-09-04 DIAGNOSIS — Z95.5 S/P CORONARY ARTERY STENT PLACEMENT: ICD-10-CM

## 2018-09-04 LAB
ANION GAP SERPL CALCULATED.3IONS-SCNC: 10 MMOL/L (ref 5–18)
BNP SERPL-MCNC: 358 PG/ML (ref 0–167)
BUN SERPL-MCNC: 28 MG/DL (ref 8–28)
CALCIUM SERPL-MCNC: 9.7 MG/DL (ref 8.5–10.5)
CHLORIDE BLD-SCNC: 106 MMOL/L (ref 98–107)
CK SERPL-CCNC: 172 U/L (ref 30–190)
CO2 SERPL-SCNC: 24 MMOL/L (ref 22–31)
CREAT SERPL-MCNC: 0.95 MG/DL (ref 0.6–1.1)
D DIMER PPP FEU-MCNC: 0.48 FEU UG/ML
ERYTHROCYTE [DISTWIDTH] IN BLOOD BY AUTOMATED COUNT: 13.4 % (ref 11–14.5)
GFR SERPL CREATININE-BSD FRML MDRD: 56 ML/MIN/1.73M2
GLUCOSE BLD-MCNC: 84 MG/DL (ref 70–125)
HCT VFR BLD AUTO: 40.3 % (ref 35–47)
HGB BLD-MCNC: 13.6 G/DL (ref 12–16)
MCH RBC QN AUTO: 30.6 PG (ref 27–34)
MCHC RBC AUTO-ENTMCNC: 33.7 G/DL (ref 32–36)
MCV RBC AUTO: 91 FL (ref 80–100)
PLATELET # BLD AUTO: 257 THOU/UL (ref 140–440)
PMV BLD AUTO: 10.1 FL (ref 8.5–12.5)
POTASSIUM BLD-SCNC: 3.7 MMOL/L (ref 3.5–5)
RBC # BLD AUTO: 4.44 MILL/UL (ref 3.8–5.4)
SODIUM SERPL-SCNC: 140 MMOL/L (ref 136–145)
WBC: 6.4 THOU/UL (ref 4–11)

## 2018-09-04 ASSESSMENT — MIFFLIN-ST. JEOR: SCORE: 1170.24

## 2018-09-05 ENCOUNTER — COMMUNICATION - HEALTHEAST (OUTPATIENT)
Dept: PHYSICAL MEDICINE AND REHAB | Facility: CLINIC | Age: 83
End: 2018-09-05

## 2018-09-05 LAB
ATRIAL RATE - MUSE: 87 BPM
DIASTOLIC BLOOD PRESSURE - MUSE: NORMAL MMHG
INTERPRETATION ECG - MUSE: NORMAL
P AXIS - MUSE: 38 DEGREES
PR INTERVAL - MUSE: 234 MS
QRS DURATION - MUSE: 138 MS
QT - MUSE: 400 MS
QTC - MUSE: 481 MS
R AXIS - MUSE: -47 DEGREES
SYSTOLIC BLOOD PRESSURE - MUSE: NORMAL MMHG
T AXIS - MUSE: 124 DEGREES
VENTRICULAR RATE- MUSE: 87 BPM

## 2018-09-06 ENCOUNTER — AMBULATORY - HEALTHEAST (OUTPATIENT)
Dept: CARDIOLOGY | Facility: CLINIC | Age: 83
End: 2018-09-06

## 2018-09-06 ENCOUNTER — AMBULATORY - HEALTHEAST (OUTPATIENT)
Dept: CARDIAC REHAB | Facility: CLINIC | Age: 83
End: 2018-09-06

## 2018-09-06 DIAGNOSIS — Z95.5 S/P CORONARY ARTERY STENT PLACEMENT: ICD-10-CM

## 2018-09-06 DIAGNOSIS — R06.09 DOE (DYSPNEA ON EXERTION): ICD-10-CM

## 2018-09-06 DIAGNOSIS — Z95.820 S/P ANGIOPLASTY WITH STENT: ICD-10-CM

## 2018-09-12 ENCOUNTER — AMBULATORY - HEALTHEAST (OUTPATIENT)
Dept: CARDIAC REHAB | Facility: CLINIC | Age: 83
End: 2018-09-12

## 2018-09-12 DIAGNOSIS — Z95.5 S/P CORONARY ARTERY STENT PLACEMENT: ICD-10-CM

## 2018-09-14 ENCOUNTER — COMMUNICATION - HEALTHEAST (OUTPATIENT)
Dept: ANTICOAGULATION | Facility: CLINIC | Age: 83
End: 2018-09-14

## 2018-09-14 ENCOUNTER — AMBULATORY - HEALTHEAST (OUTPATIENT)
Dept: CARDIAC REHAB | Facility: CLINIC | Age: 83
End: 2018-09-14

## 2018-09-14 DIAGNOSIS — Z95.5 S/P CORONARY ARTERY STENT PLACEMENT: ICD-10-CM

## 2018-09-17 ENCOUNTER — OFFICE VISIT - HEALTHEAST (OUTPATIENT)
Dept: INTERNAL MEDICINE | Facility: CLINIC | Age: 83
End: 2018-09-17

## 2018-09-17 ENCOUNTER — COMMUNICATION - HEALTHEAST (OUTPATIENT)
Dept: ANTICOAGULATION | Facility: CLINIC | Age: 83
End: 2018-09-17

## 2018-09-17 DIAGNOSIS — I25.10 CORONARY ATHEROSCLEROSIS: ICD-10-CM

## 2018-09-17 DIAGNOSIS — I10 ESSENTIAL HYPERTENSION WITH GOAL BLOOD PRESSURE LESS THAN 140/90: ICD-10-CM

## 2018-09-17 DIAGNOSIS — I26.99 OTHER ACUTE PULMONARY EMBOLISM WITHOUT ACUTE COR PULMONALE (H): ICD-10-CM

## 2018-09-17 DIAGNOSIS — E78.00 HYPERCHOLESTEREMIA: ICD-10-CM

## 2018-09-17 DIAGNOSIS — I26.99 PULMONARY EMBOLISM (H): ICD-10-CM

## 2018-09-17 LAB
ALBUMIN SERPL-MCNC: 3.7 G/DL (ref 3.5–5)
ALP SERPL-CCNC: 65 U/L (ref 45–120)
ALT SERPL W P-5'-P-CCNC: 25 U/L (ref 0–45)
ANION GAP SERPL CALCULATED.3IONS-SCNC: 9 MMOL/L (ref 5–18)
AST SERPL W P-5'-P-CCNC: 30 U/L (ref 0–40)
BILIRUB DIRECT SERPL-MCNC: 0.2 MG/DL
BILIRUB SERPL-MCNC: 0.6 MG/DL (ref 0–1)
BUN SERPL-MCNC: 24 MG/DL (ref 8–28)
CALCIUM SERPL-MCNC: 9.5 MG/DL (ref 8.5–10.5)
CHLORIDE BLD-SCNC: 103 MMOL/L (ref 98–107)
CHOLEST SERPL-MCNC: 141 MG/DL
CO2 SERPL-SCNC: 27 MMOL/L (ref 22–31)
CREAT SERPL-MCNC: 0.84 MG/DL (ref 0.6–1.1)
ERYTHROCYTE [DISTWIDTH] IN BLOOD BY AUTOMATED COUNT: 11.9 % (ref 11–14.5)
FASTING STATUS PATIENT QL REPORTED: YES
GFR SERPL CREATININE-BSD FRML MDRD: >60 ML/MIN/1.73M2
GLUCOSE BLD-MCNC: 92 MG/DL (ref 70–125)
HCT VFR BLD AUTO: 39.2 % (ref 35–47)
HDLC SERPL-MCNC: 67 MG/DL
HGB BLD-MCNC: 13.1 G/DL (ref 12–16)
INR PPP: 2.5 (ref 0.9–1.1)
LDLC SERPL CALC-MCNC: 62 MG/DL
MCH RBC QN AUTO: 30.8 PG (ref 27–34)
MCHC RBC AUTO-ENTMCNC: 33.5 G/DL (ref 32–36)
MCV RBC AUTO: 92 FL (ref 80–100)
PLATELET # BLD AUTO: 244 THOU/UL (ref 140–440)
PMV BLD AUTO: 7.4 FL (ref 7–10)
POTASSIUM BLD-SCNC: 3.6 MMOL/L (ref 3.5–5)
PROT SERPL-MCNC: 7 G/DL (ref 6–8)
RBC # BLD AUTO: 4.26 MILL/UL (ref 3.8–5.4)
SODIUM SERPL-SCNC: 139 MMOL/L (ref 136–145)
TRIGL SERPL-MCNC: 62 MG/DL
WBC: 4.1 THOU/UL (ref 4–11)

## 2018-09-17 ASSESSMENT — MIFFLIN-ST. JEOR: SCORE: 1165.71

## 2018-09-19 ENCOUNTER — HOSPITAL ENCOUNTER (OUTPATIENT)
Dept: PHYSICAL MEDICINE AND REHAB | Facility: CLINIC | Age: 83
Discharge: HOME | End: 2018-09-19
Attending: PHYSICAL MEDICINE & REHABILITATION

## 2018-09-19 ENCOUNTER — COMMUNICATION - HEALTHEAST (OUTPATIENT)
Dept: INTERNAL MEDICINE | Facility: CLINIC | Age: 83
End: 2018-09-19

## 2018-09-19 DIAGNOSIS — Z79.01 ON WARFARIN THERAPY: ICD-10-CM

## 2018-09-19 DIAGNOSIS — M54.16 LUMBAR RADICULITIS: ICD-10-CM

## 2018-09-19 LAB — POC INR - HE - HISTORICAL: 1.7 (ref 0.9–1.1)

## 2018-09-22 ENCOUNTER — COMMUNICATION - HEALTHEAST (OUTPATIENT)
Dept: CARDIOLOGY | Facility: CLINIC | Age: 83
End: 2018-09-22

## 2018-09-24 ENCOUNTER — COMMUNICATION - HEALTHEAST (OUTPATIENT)
Dept: CARDIOLOGY | Facility: CLINIC | Age: 83
End: 2018-09-24

## 2018-09-24 DIAGNOSIS — R06.02 SOB (SHORTNESS OF BREATH): ICD-10-CM

## 2018-10-02 ENCOUNTER — AMBULATORY - HEALTHEAST (OUTPATIENT)
Dept: LAB | Facility: CLINIC | Age: 83
End: 2018-10-02

## 2018-10-02 ENCOUNTER — COMMUNICATION - HEALTHEAST (OUTPATIENT)
Dept: ANTICOAGULATION | Facility: CLINIC | Age: 83
End: 2018-10-02

## 2018-10-02 DIAGNOSIS — I26.99 OTHER ACUTE PULMONARY EMBOLISM WITHOUT ACUTE COR PULMONALE (H): ICD-10-CM

## 2018-10-02 DIAGNOSIS — I26.99 PULMONARY EMBOLISM (H): ICD-10-CM

## 2018-10-02 LAB — INR PPP: 1.4 (ref 0.9–1.1)

## 2018-10-15 ENCOUNTER — COMMUNICATION - HEALTHEAST (OUTPATIENT)
Dept: ANTICOAGULATION | Facility: CLINIC | Age: 83
End: 2018-10-15

## 2018-10-20 ENCOUNTER — COMMUNICATION - HEALTHEAST (OUTPATIENT)
Dept: PHYSICAL MEDICINE AND REHAB | Facility: CLINIC | Age: 83
End: 2018-10-20

## 2018-10-23 ENCOUNTER — OFFICE VISIT - HEALTHEAST (OUTPATIENT)
Dept: CARDIOLOGY | Facility: CLINIC | Age: 83
End: 2018-10-23

## 2018-10-23 ENCOUNTER — AMBULATORY - HEALTHEAST (OUTPATIENT)
Dept: CARDIOLOGY | Facility: CLINIC | Age: 83
End: 2018-10-23

## 2018-10-23 ENCOUNTER — COMMUNICATION - HEALTHEAST (OUTPATIENT)
Dept: CARDIOLOGY | Facility: CLINIC | Age: 83
End: 2018-10-23

## 2018-10-23 ENCOUNTER — COMMUNICATION - HEALTHEAST (OUTPATIENT)
Dept: ANTICOAGULATION | Facility: CLINIC | Age: 83
End: 2018-10-23

## 2018-10-23 DIAGNOSIS — I10 ESSENTIAL HYPERTENSION WITH GOAL BLOOD PRESSURE LESS THAN 140/90: ICD-10-CM

## 2018-10-23 DIAGNOSIS — I26.99 OTHER ACUTE PULMONARY EMBOLISM WITHOUT ACUTE COR PULMONALE (H): ICD-10-CM

## 2018-10-23 DIAGNOSIS — Z79.01 LONG TERM CURRENT USE OF ANTICOAGULANT THERAPY: ICD-10-CM

## 2018-10-23 DIAGNOSIS — I25.10 ATHEROSCLEROSIS OF NATIVE CORONARY ARTERY OF NATIVE HEART WITHOUT ANGINA PECTORIS: ICD-10-CM

## 2018-10-23 DIAGNOSIS — R94.39 ABNORMAL NUCLEAR STRESS TEST: ICD-10-CM

## 2018-10-23 DIAGNOSIS — I47.10 PAROXYSMAL SUPRAVENTRICULAR TACHYCARDIA (H): ICD-10-CM

## 2018-10-23 DIAGNOSIS — E78.00 HYPERCHOLESTEREMIA: ICD-10-CM

## 2018-10-23 LAB — POC INR - HE - HISTORICAL: 2.3 (ref 0.9–1.1)

## 2018-10-23 ASSESSMENT — MIFFLIN-ST. JEOR: SCORE: 1161.17

## 2018-10-29 ENCOUNTER — HOSPITAL ENCOUNTER (OUTPATIENT)
Dept: PHYSICAL MEDICINE AND REHAB | Facility: CLINIC | Age: 83
Discharge: HOME | End: 2018-10-29
Attending: PHYSICAL MEDICINE & REHABILITATION

## 2018-10-29 DIAGNOSIS — M54.42 LOW BACK PAIN WITH LEFT-SIDED SCIATICA: ICD-10-CM

## 2018-10-29 DIAGNOSIS — M48.061 STENOSIS OF LATERAL RECESS OF LUMBAR SPINE: ICD-10-CM

## 2018-10-29 DIAGNOSIS — M48.061 LUMBAR FORAMINAL STENOSIS: ICD-10-CM

## 2018-10-29 DIAGNOSIS — M41.9 SCOLIOSIS: ICD-10-CM

## 2018-10-31 ENCOUNTER — HOSPITAL ENCOUNTER (OUTPATIENT)
Dept: CARDIOLOGY | Facility: CLINIC | Age: 83
Discharge: HOME | End: 2018-10-31
Attending: INTERNAL MEDICINE

## 2018-10-31 DIAGNOSIS — I47.10 PAROXYSMAL SUPRAVENTRICULAR TACHYCARDIA (H): ICD-10-CM

## 2018-10-31 DIAGNOSIS — I25.10 ATHEROSCLEROSIS OF NATIVE CORONARY ARTERY OF NATIVE HEART WITHOUT ANGINA PECTORIS: ICD-10-CM

## 2018-11-06 ENCOUNTER — COMMUNICATION - HEALTHEAST (OUTPATIENT)
Dept: ANTICOAGULATION | Facility: CLINIC | Age: 83
End: 2018-11-06

## 2018-11-06 ENCOUNTER — AMBULATORY - HEALTHEAST (OUTPATIENT)
Dept: LAB | Facility: CLINIC | Age: 83
End: 2018-11-06

## 2018-11-06 DIAGNOSIS — I26.99 OTHER ACUTE PULMONARY EMBOLISM WITHOUT ACUTE COR PULMONALE (H): ICD-10-CM

## 2018-11-06 DIAGNOSIS — I26.99 PULMONARY EMBOLISM (H): ICD-10-CM

## 2018-11-06 LAB — INR PPP: 2 (ref 0.9–1.1)

## 2018-11-14 ENCOUNTER — COMMUNICATION - HEALTHEAST (OUTPATIENT)
Dept: CARDIOLOGY | Facility: CLINIC | Age: 83
End: 2018-11-14

## 2018-11-20 ENCOUNTER — HOSPITAL ENCOUNTER (OUTPATIENT)
Dept: PHYSICAL MEDICINE AND REHAB | Facility: CLINIC | Age: 83
Discharge: HOME | End: 2018-11-20
Attending: PHYSICAL MEDICINE & REHABILITATION

## 2018-11-20 DIAGNOSIS — M54.42 LOW BACK PAIN WITH LEFT-SIDED SCIATICA: ICD-10-CM

## 2018-11-20 DIAGNOSIS — Z79.01 ON WARFARIN THERAPY: ICD-10-CM

## 2018-11-20 DIAGNOSIS — M41.9 SCOLIOSIS: ICD-10-CM

## 2018-11-20 DIAGNOSIS — M48.061 LUMBAR FORAMINAL STENOSIS: ICD-10-CM

## 2018-11-20 DIAGNOSIS — M99.83 OTHER BIOMECHANICAL LESIONS OF LUMBAR REGION: ICD-10-CM

## 2018-11-20 DIAGNOSIS — M48.061 STENOSIS OF LATERAL RECESS OF LUMBAR SPINE: ICD-10-CM

## 2018-11-20 LAB — POC INR - HE - HISTORICAL: 2.9 (ref 0.9–1.1)

## 2018-11-28 ENCOUNTER — COMMUNICATION - HEALTHEAST (OUTPATIENT)
Dept: INTERNAL MEDICINE | Facility: CLINIC | Age: 83
End: 2018-11-28

## 2018-11-28 ENCOUNTER — AMBULATORY - HEALTHEAST (OUTPATIENT)
Dept: LAB | Facility: CLINIC | Age: 83
End: 2018-11-28

## 2018-11-28 ENCOUNTER — COMMUNICATION - HEALTHEAST (OUTPATIENT)
Dept: ANTICOAGULATION | Facility: CLINIC | Age: 83
End: 2018-11-28

## 2018-11-28 DIAGNOSIS — I26.99 OTHER ACUTE PULMONARY EMBOLISM WITHOUT ACUTE COR PULMONALE (H): ICD-10-CM

## 2018-11-28 DIAGNOSIS — I26.99 PULMONARY EMBOLISM (H): ICD-10-CM

## 2018-11-28 LAB — INR PPP: 3.9 (ref 0.9–1.1)

## 2018-11-29 ENCOUNTER — COMMUNICATION - HEALTHEAST (OUTPATIENT)
Dept: INTERNAL MEDICINE | Facility: CLINIC | Age: 83
End: 2018-11-29

## 2018-11-30 ENCOUNTER — COMMUNICATION - HEALTHEAST (OUTPATIENT)
Dept: ANTICOAGULATION | Facility: CLINIC | Age: 83
End: 2018-11-30

## 2018-11-30 ENCOUNTER — COMMUNICATION - HEALTHEAST (OUTPATIENT)
Dept: INTERNAL MEDICINE | Facility: CLINIC | Age: 83
End: 2018-11-30

## 2018-11-30 ENCOUNTER — COMMUNICATION - HEALTHEAST (OUTPATIENT)
Dept: CARDIOLOGY | Facility: CLINIC | Age: 83
End: 2018-11-30

## 2018-11-30 DIAGNOSIS — I26.99 OTHER ACUTE PULMONARY EMBOLISM WITHOUT ACUTE COR PULMONALE (H): ICD-10-CM

## 2018-12-07 ENCOUNTER — COMMUNICATION - HEALTHEAST (OUTPATIENT)
Dept: ANTICOAGULATION | Facility: CLINIC | Age: 83
End: 2018-12-07

## 2018-12-07 ENCOUNTER — AMBULATORY - HEALTHEAST (OUTPATIENT)
Dept: LAB | Facility: CLINIC | Age: 83
End: 2018-12-07

## 2018-12-07 DIAGNOSIS — I26.99 PULMONARY EMBOLISM (H): ICD-10-CM

## 2018-12-07 DIAGNOSIS — I26.99 OTHER ACUTE PULMONARY EMBOLISM WITHOUT ACUTE COR PULMONALE (H): ICD-10-CM

## 2018-12-07 LAB — INR PPP: 1.6 (ref 0.9–1.1)

## 2018-12-17 ENCOUNTER — OFFICE VISIT - HEALTHEAST (OUTPATIENT)
Dept: INTERNAL MEDICINE | Facility: CLINIC | Age: 83
End: 2018-12-17

## 2018-12-17 ENCOUNTER — COMMUNICATION - HEALTHEAST (OUTPATIENT)
Dept: ANTICOAGULATION | Facility: CLINIC | Age: 83
End: 2018-12-17

## 2018-12-17 DIAGNOSIS — M48.061 SPINAL STENOSIS OF LUMBAR REGION WITH RADICULOPATHY: ICD-10-CM

## 2018-12-17 DIAGNOSIS — I10 ESSENTIAL HYPERTENSION WITH GOAL BLOOD PRESSURE LESS THAN 140/90: ICD-10-CM

## 2018-12-17 DIAGNOSIS — N32.81 OVERACTIVE BLADDER: ICD-10-CM

## 2018-12-17 DIAGNOSIS — M54.16 SPINAL STENOSIS OF LUMBAR REGION WITH RADICULOPATHY: ICD-10-CM

## 2018-12-17 DIAGNOSIS — I82.402 ACUTE THROMBOEMBOLISM OF DEEP VEINS OF LEFT LOWER EXTREMITY (H): ICD-10-CM

## 2018-12-17 DIAGNOSIS — I26.99 OTHER ACUTE PULMONARY EMBOLISM WITHOUT ACUTE COR PULMONALE (H): ICD-10-CM

## 2018-12-17 DIAGNOSIS — I25.10 ATHEROSCLEROSIS OF NATIVE CORONARY ARTERY OF NATIVE HEART WITHOUT ANGINA PECTORIS: ICD-10-CM

## 2018-12-17 DIAGNOSIS — E78.00 HYPERCHOLESTEREMIA: ICD-10-CM

## 2018-12-17 DIAGNOSIS — Z23 FLU VACCINE NEED: ICD-10-CM

## 2018-12-17 DIAGNOSIS — M54.2 NECK PAIN: ICD-10-CM

## 2018-12-17 DIAGNOSIS — I47.10 PAROXYSMAL SUPRAVENTRICULAR TACHYCARDIA (H): ICD-10-CM

## 2018-12-17 DIAGNOSIS — I26.99 PULMONARY EMBOLISM (H): ICD-10-CM

## 2018-12-17 LAB — INR PPP: 1.6 (ref 0.9–1.1)

## 2018-12-17 ASSESSMENT — MIFFLIN-ST. JEOR: SCORE: 1143.03

## 2019-01-03 ENCOUNTER — COMMUNICATION - HEALTHEAST (OUTPATIENT)
Dept: ANTICOAGULATION | Facility: CLINIC | Age: 84
End: 2019-01-03

## 2019-01-07 ENCOUNTER — AMBULATORY - HEALTHEAST (OUTPATIENT)
Dept: ANTICOAGULATION | Facility: CLINIC | Age: 84
End: 2019-01-07

## 2019-01-07 ENCOUNTER — AMBULATORY - HEALTHEAST (OUTPATIENT)
Dept: LAB | Facility: CLINIC | Age: 84
End: 2019-01-07

## 2019-01-07 ENCOUNTER — COMMUNICATION - HEALTHEAST (OUTPATIENT)
Dept: ANTICOAGULATION | Facility: CLINIC | Age: 84
End: 2019-01-07

## 2019-01-07 DIAGNOSIS — I26.99 PULMONARY EMBOLISM (H): ICD-10-CM

## 2019-01-07 DIAGNOSIS — I26.99 OTHER ACUTE PULMONARY EMBOLISM WITHOUT ACUTE COR PULMONALE (H): ICD-10-CM

## 2019-01-07 LAB — INR PPP: 2.6 (ref 0.9–1.1)

## 2019-01-21 ENCOUNTER — AMBULATORY - HEALTHEAST (OUTPATIENT)
Dept: LAB | Facility: CLINIC | Age: 84
End: 2019-01-21

## 2019-01-21 ENCOUNTER — COMMUNICATION - HEALTHEAST (OUTPATIENT)
Dept: ANTICOAGULATION | Facility: CLINIC | Age: 84
End: 2019-01-21

## 2019-01-21 DIAGNOSIS — I26.99 PULMONARY EMBOLISM (H): ICD-10-CM

## 2019-01-21 DIAGNOSIS — I26.99 OTHER ACUTE PULMONARY EMBOLISM WITHOUT ACUTE COR PULMONALE (H): ICD-10-CM

## 2019-01-21 LAB — INR PPP: 3 (ref 0.9–1.1)

## 2019-01-30 ENCOUNTER — COMMUNICATION - HEALTHEAST (OUTPATIENT)
Dept: INTERNAL MEDICINE | Facility: CLINIC | Age: 84
End: 2019-01-30

## 2019-01-30 DIAGNOSIS — I10 ESSENTIAL HYPERTENSION WITH GOAL BLOOD PRESSURE LESS THAN 140/90: ICD-10-CM

## 2019-01-31 ENCOUNTER — COMMUNICATION - HEALTHEAST (OUTPATIENT)
Dept: ANTICOAGULATION | Facility: CLINIC | Age: 84
End: 2019-01-31

## 2019-01-31 ENCOUNTER — AMBULATORY - HEALTHEAST (OUTPATIENT)
Dept: LAB | Facility: CLINIC | Age: 84
End: 2019-01-31

## 2019-01-31 DIAGNOSIS — I26.99 OTHER ACUTE PULMONARY EMBOLISM WITHOUT ACUTE COR PULMONALE (H): ICD-10-CM

## 2019-01-31 DIAGNOSIS — I26.99 PULMONARY EMBOLISM (H): ICD-10-CM

## 2019-01-31 LAB — INR PPP: 2 (ref 0.9–1.1)

## 2019-02-07 ENCOUNTER — RECORDS - HEALTHEAST (OUTPATIENT)
Dept: ADMINISTRATIVE | Facility: OTHER | Age: 84
End: 2019-02-07

## 2019-02-11 ENCOUNTER — COMMUNICATION - HEALTHEAST (OUTPATIENT)
Dept: PHYSICAL MEDICINE AND REHAB | Facility: CLINIC | Age: 84
End: 2019-02-11

## 2019-02-12 ENCOUNTER — COMMUNICATION - HEALTHEAST (OUTPATIENT)
Dept: PHYSICAL MEDICINE AND REHAB | Facility: CLINIC | Age: 84
End: 2019-02-12

## 2019-02-12 DIAGNOSIS — M54.16 LUMBAR RADICULITIS: ICD-10-CM

## 2019-02-14 ENCOUNTER — AMBULATORY - HEALTHEAST (OUTPATIENT)
Dept: LAB | Facility: CLINIC | Age: 84
End: 2019-02-14

## 2019-02-14 ENCOUNTER — COMMUNICATION - HEALTHEAST (OUTPATIENT)
Dept: ANTICOAGULATION | Facility: CLINIC | Age: 84
End: 2019-02-14

## 2019-02-14 DIAGNOSIS — I26.99 OTHER ACUTE PULMONARY EMBOLISM WITHOUT ACUTE COR PULMONALE (H): ICD-10-CM

## 2019-02-14 DIAGNOSIS — I26.99 PULMONARY EMBOLISM (H): ICD-10-CM

## 2019-02-14 LAB — INR PPP: 2.7 (ref 0.9–1.1)

## 2019-03-01 ENCOUNTER — COMMUNICATION - HEALTHEAST (OUTPATIENT)
Dept: ANTICOAGULATION | Facility: CLINIC | Age: 84
End: 2019-03-01

## 2019-03-01 ENCOUNTER — AMBULATORY - HEALTHEAST (OUTPATIENT)
Dept: LAB | Facility: CLINIC | Age: 84
End: 2019-03-01

## 2019-03-01 DIAGNOSIS — I26.99 PULMONARY EMBOLISM (H): ICD-10-CM

## 2019-03-01 DIAGNOSIS — I26.99 OTHER ACUTE PULMONARY EMBOLISM WITHOUT ACUTE COR PULMONALE (H): ICD-10-CM

## 2019-03-01 LAB — INR PPP: 1.6 (ref 0.9–1.1)

## 2019-03-06 ENCOUNTER — OFFICE VISIT - HEALTHEAST (OUTPATIENT)
Dept: CARDIOLOGY | Facility: CLINIC | Age: 84
End: 2019-03-06

## 2019-03-06 ENCOUNTER — AMBULATORY - HEALTHEAST (OUTPATIENT)
Dept: CARDIOLOGY | Facility: CLINIC | Age: 84
End: 2019-03-06

## 2019-03-06 DIAGNOSIS — I25.83 CORONARY ATHEROSCLEROSIS DUE TO LIPID RICH PLAQUE: ICD-10-CM

## 2019-03-06 DIAGNOSIS — I26.99 PULMONARY EMBOLISM (H): ICD-10-CM

## 2019-03-06 DIAGNOSIS — I26.99 OTHER ACUTE PULMONARY EMBOLISM WITHOUT ACUTE COR PULMONALE (H): ICD-10-CM

## 2019-03-06 DIAGNOSIS — R00.2 PALPITATIONS: ICD-10-CM

## 2019-03-06 DIAGNOSIS — I10 ESSENTIAL HYPERTENSION WITH GOAL BLOOD PRESSURE LESS THAN 140/90: ICD-10-CM

## 2019-03-06 DIAGNOSIS — Z79.01 LONG TERM CURRENT USE OF ANTICOAGULANT THERAPY: ICD-10-CM

## 2019-03-06 DIAGNOSIS — E78.00 HYPERCHOLESTEREMIA: ICD-10-CM

## 2019-03-06 LAB — POC INR - HE - HISTORICAL: 1.6 (ref 0.9–1.1)

## 2019-03-06 ASSESSMENT — MIFFLIN-ST. JEOR: SCORE: 1138.49

## 2019-03-07 ENCOUNTER — COMMUNICATION - HEALTHEAST (OUTPATIENT)
Dept: ANTICOAGULATION | Facility: CLINIC | Age: 84
End: 2019-03-07

## 2019-03-07 ENCOUNTER — HOSPITAL ENCOUNTER (OUTPATIENT)
Dept: PHYSICAL MEDICINE AND REHAB | Facility: CLINIC | Age: 84
Discharge: HOME | End: 2019-03-07
Attending: PHYSICAL MEDICINE & REHABILITATION

## 2019-03-07 DIAGNOSIS — I26.99 OTHER ACUTE PULMONARY EMBOLISM WITHOUT ACUTE COR PULMONALE (H): ICD-10-CM

## 2019-03-07 DIAGNOSIS — M54.16 LUMBAR RADICULITIS: ICD-10-CM

## 2019-03-07 DIAGNOSIS — Z79.01 ON WARFARIN THERAPY: ICD-10-CM

## 2019-03-07 LAB — POC INR - HE - HISTORICAL: 1.9 (ref 0.9–1.1)

## 2019-03-08 ENCOUNTER — COMMUNICATION - HEALTHEAST (OUTPATIENT)
Dept: CARDIOLOGY | Facility: CLINIC | Age: 84
End: 2019-03-08

## 2019-03-15 ENCOUNTER — HOSPITAL ENCOUNTER (OUTPATIENT)
Dept: NUCLEAR MEDICINE | Facility: CLINIC | Age: 84
Discharge: HOME | End: 2019-03-15
Attending: INTERNAL MEDICINE

## 2019-03-15 ENCOUNTER — HOSPITAL ENCOUNTER (OUTPATIENT)
Dept: CARDIOLOGY | Facility: CLINIC | Age: 84
Discharge: HOME | End: 2019-03-15
Attending: INTERNAL MEDICINE

## 2019-03-15 DIAGNOSIS — I25.83 CORONARY ATHEROSCLEROSIS DUE TO LIPID RICH PLAQUE: ICD-10-CM

## 2019-03-15 LAB
CV STRESS CURRENT BP HE: NORMAL
CV STRESS CURRENT HR HE: 71
CV STRESS CURRENT HR HE: 77
CV STRESS CURRENT HR HE: 77
CV STRESS CURRENT HR HE: 81
CV STRESS CURRENT HR HE: 84
CV STRESS CURRENT HR HE: 84
CV STRESS CURRENT HR HE: 87
CV STRESS CURRENT HR HE: 88
CV STRESS CURRENT HR HE: 89
CV STRESS CURRENT HR HE: 91
CV STRESS CURRENT HR HE: 91
CV STRESS CURRENT HR HE: 93
CV STRESS CURRENT HR HE: 96
CV STRESS DEVIATION TIME HE: NORMAL
CV STRESS ECHO PERCENT HR HE: NORMAL
CV STRESS EXERCISE STAGE HE: NORMAL
CV STRESS FINAL RESTING BP HE: NORMAL
CV STRESS FINAL RESTING HR HE: 81
CV STRESS MAX HR HE: 98
CV STRESS MAX TREADMILL GRADE HE: 0
CV STRESS MAX TREADMILL SPEED HE: 0
CV STRESS PEAK DIA BP HE: NORMAL
CV STRESS PEAK SYS BP HE: NORMAL
CV STRESS PHASE HE: NORMAL
CV STRESS PROTOCOL HE: NORMAL
CV STRESS RESTING PT POSITION HE: NORMAL
CV STRESS ST DEVIATION AMOUNT HE: NORMAL
CV STRESS ST DEVIATION ELEVATION HE: NORMAL
CV STRESS ST EVELATION AMOUNT HE: NORMAL
CV STRESS TEST TYPE HE: NORMAL
CV STRESS TOTAL STAGE TIME MIN 1 HE: NORMAL
NUC STRESS EJECTION FRACTION: 50 %
STRESS ECHO BASELINE BP: NORMAL
STRESS ECHO BASELINE HR: 72
STRESS ECHO CALCULATED PERCENT HR: 73 %
STRESS ECHO LAST STRESS BP: NORMAL
STRESS ECHO LAST STRESS HR: 89

## 2019-03-28 ENCOUNTER — COMMUNICATION - HEALTHEAST (OUTPATIENT)
Dept: ANTICOAGULATION | Facility: CLINIC | Age: 84
End: 2019-03-28

## 2019-03-28 ENCOUNTER — AMBULATORY - HEALTHEAST (OUTPATIENT)
Dept: LAB | Facility: CLINIC | Age: 84
End: 2019-03-28

## 2019-03-28 DIAGNOSIS — I26.99 OTHER ACUTE PULMONARY EMBOLISM WITHOUT ACUTE COR PULMONALE (H): ICD-10-CM

## 2019-03-28 DIAGNOSIS — I26.99 PULMONARY EMBOLISM (H): ICD-10-CM

## 2019-03-28 LAB — INR PPP: 2.4 (ref 0.9–1.1)

## 2019-04-04 ENCOUNTER — RECORDS - HEALTHEAST (OUTPATIENT)
Dept: GENERAL RADIOLOGY | Facility: CLINIC | Age: 84
End: 2019-04-04

## 2019-04-04 ENCOUNTER — OFFICE VISIT - HEALTHEAST (OUTPATIENT)
Dept: RHEUMATOLOGY | Facility: CLINIC | Age: 84
End: 2019-04-04

## 2019-04-04 DIAGNOSIS — M54.2 CERVICALGIA: ICD-10-CM

## 2019-04-04 DIAGNOSIS — G89.29 CHRONIC UPPER BACK PAIN: ICD-10-CM

## 2019-04-04 DIAGNOSIS — G89.29 OTHER CHRONIC PAIN: ICD-10-CM

## 2019-04-04 DIAGNOSIS — M54.5 CHRONIC BILATERAL LOW BACK PAIN, WITH SCIATICA PRESENCE UNSPECIFIED: ICD-10-CM

## 2019-04-04 DIAGNOSIS — G89.29 CHRONIC BILATERAL LOW BACK PAIN, WITH SCIATICA PRESENCE UNSPECIFIED: ICD-10-CM

## 2019-04-04 DIAGNOSIS — R29.898 COMPLAINTS OF LEG WEAKNESS: ICD-10-CM

## 2019-04-04 DIAGNOSIS — M85.80 OSTEOPENIA, UNSPECIFIED LOCATION: ICD-10-CM

## 2019-04-04 DIAGNOSIS — M54.9 CHRONIC UPPER BACK PAIN: ICD-10-CM

## 2019-04-04 DIAGNOSIS — M15.9 OSTEOARTHRITIS OF MULTIPLE JOINTS, UNSPECIFIED OSTEOARTHRITIS TYPE: ICD-10-CM

## 2019-04-04 DIAGNOSIS — M48.061 SPINAL STENOSIS OF LUMBAR REGION, UNSPECIFIED WHETHER NEUROGENIC CLAUDICATION PRESENT: ICD-10-CM

## 2019-04-04 DIAGNOSIS — G89.29 CHRONIC NECK PAIN: ICD-10-CM

## 2019-04-04 DIAGNOSIS — M54.2 CHRONIC NECK PAIN: ICD-10-CM

## 2019-04-11 ENCOUNTER — COMMUNICATION - HEALTHEAST (OUTPATIENT)
Dept: RHEUMATOLOGY | Facility: CLINIC | Age: 84
End: 2019-04-11

## 2019-04-12 ENCOUNTER — COMMUNICATION - HEALTHEAST (OUTPATIENT)
Dept: RHEUMATOLOGY | Facility: CLINIC | Age: 84
End: 2019-04-12

## 2019-04-17 ENCOUNTER — OFFICE VISIT - HEALTHEAST (OUTPATIENT)
Dept: INTERNAL MEDICINE | Facility: CLINIC | Age: 84
End: 2019-04-17

## 2019-04-17 ENCOUNTER — COMMUNICATION - HEALTHEAST (OUTPATIENT)
Dept: ANTICOAGULATION | Facility: CLINIC | Age: 84
End: 2019-04-17

## 2019-04-17 DIAGNOSIS — I26.99 PULMONARY EMBOLISM (H): ICD-10-CM

## 2019-04-17 DIAGNOSIS — R26.89 IMBALANCE: ICD-10-CM

## 2019-04-17 DIAGNOSIS — I25.83 CORONARY ATHEROSCLEROSIS DUE TO LIPID RICH PLAQUE: ICD-10-CM

## 2019-04-17 DIAGNOSIS — E78.00 HYPERCHOLESTEREMIA: ICD-10-CM

## 2019-04-17 DIAGNOSIS — I10 ESSENTIAL HYPERTENSION WITH GOAL BLOOD PRESSURE LESS THAN 140/90: ICD-10-CM

## 2019-04-17 DIAGNOSIS — I26.99 OTHER ACUTE PULMONARY EMBOLISM WITHOUT ACUTE COR PULMONALE (H): ICD-10-CM

## 2019-04-17 DIAGNOSIS — J06.9 UPPER RESPIRATORY TRACT INFECTION, UNSPECIFIED TYPE: ICD-10-CM

## 2019-04-17 DIAGNOSIS — R29.898 WEAKNESS OF BOTH LOWER EXTREMITIES: ICD-10-CM

## 2019-04-17 LAB
ALBUMIN SERPL-MCNC: 3.7 G/DL (ref 3.5–5)
ALP SERPL-CCNC: 60 U/L (ref 45–120)
ALT SERPL W P-5'-P-CCNC: 18 U/L (ref 0–45)
ANION GAP SERPL CALCULATED.3IONS-SCNC: 9 MMOL/L (ref 5–18)
AST SERPL W P-5'-P-CCNC: 25 U/L (ref 0–40)
BILIRUB DIRECT SERPL-MCNC: 0.2 MG/DL
BILIRUB SERPL-MCNC: 0.4 MG/DL (ref 0–1)
BUN SERPL-MCNC: 26 MG/DL (ref 8–28)
CALCIUM SERPL-MCNC: 9.5 MG/DL (ref 8.5–10.5)
CHLORIDE BLD-SCNC: 102 MMOL/L (ref 98–107)
CHOLEST SERPL-MCNC: 150 MG/DL
CO2 SERPL-SCNC: 28 MMOL/L (ref 22–31)
CREAT SERPL-MCNC: 0.9 MG/DL (ref 0.6–1.1)
ERYTHROCYTE [DISTWIDTH] IN BLOOD BY AUTOMATED COUNT: 12.7 % (ref 11–14.5)
FASTING STATUS PATIENT QL REPORTED: YES
GFR SERPL CREATININE-BSD FRML MDRD: 59 ML/MIN/1.73M2
GLUCOSE BLD-MCNC: 106 MG/DL (ref 70–125)
HCT VFR BLD AUTO: 30.8 % (ref 35–47)
HDLC SERPL-MCNC: 72 MG/DL
HGB BLD-MCNC: 10.2 G/DL (ref 12–16)
INR PPP: 1.6 (ref 0.9–1.1)
LDLC SERPL CALC-MCNC: 65 MG/DL
MCH RBC QN AUTO: 25.9 PG (ref 27–34)
MCHC RBC AUTO-ENTMCNC: 33 G/DL (ref 32–36)
MCV RBC AUTO: 79 FL (ref 80–100)
PLATELET # BLD AUTO: 328 THOU/UL (ref 140–440)
PMV BLD AUTO: 7.2 FL (ref 7–10)
POTASSIUM BLD-SCNC: 3.3 MMOL/L (ref 3.5–5)
PROT SERPL-MCNC: 7.1 G/DL (ref 6–8)
RBC # BLD AUTO: 3.92 MILL/UL (ref 3.8–5.4)
SODIUM SERPL-SCNC: 139 MMOL/L (ref 136–145)
TRIGL SERPL-MCNC: 66 MG/DL
TSH SERPL DL<=0.005 MIU/L-ACNC: 3.62 UIU/ML (ref 0.3–5)
WBC: 3.4 THOU/UL (ref 4–11)

## 2019-04-17 ASSESSMENT — MIFFLIN-ST. JEOR: SCORE: 1138.49

## 2019-04-22 ENCOUNTER — COMMUNICATION - HEALTHEAST (OUTPATIENT)
Dept: INTERNAL MEDICINE | Facility: CLINIC | Age: 84
End: 2019-04-22

## 2019-04-22 DIAGNOSIS — E78.00 HYPERCHOLESTEREMIA: ICD-10-CM

## 2019-04-22 DIAGNOSIS — D50.8 IRON DEFICIENCY ANEMIA SECONDARY TO INADEQUATE DIETARY IRON INTAKE: ICD-10-CM

## 2019-04-23 ENCOUNTER — COMMUNICATION - HEALTHEAST (OUTPATIENT)
Dept: ANTICOAGULATION | Facility: CLINIC | Age: 84
End: 2019-04-23

## 2019-04-28 ENCOUNTER — COMMUNICATION - HEALTHEAST (OUTPATIENT)
Dept: INTERNAL MEDICINE | Facility: CLINIC | Age: 84
End: 2019-04-28

## 2019-04-29 ENCOUNTER — COMMUNICATION - HEALTHEAST (OUTPATIENT)
Dept: INTERNAL MEDICINE | Facility: CLINIC | Age: 84
End: 2019-04-29

## 2019-05-07 ENCOUNTER — AMBULATORY - HEALTHEAST (OUTPATIENT)
Dept: LAB | Facility: CLINIC | Age: 84
End: 2019-05-07

## 2019-05-07 ENCOUNTER — COMMUNICATION - HEALTHEAST (OUTPATIENT)
Dept: ANTICOAGULATION | Facility: CLINIC | Age: 84
End: 2019-05-07

## 2019-05-07 DIAGNOSIS — I26.99 PULMONARY EMBOLISM (H): ICD-10-CM

## 2019-05-07 DIAGNOSIS — I26.99 OTHER ACUTE PULMONARY EMBOLISM WITHOUT ACUTE COR PULMONALE (H): ICD-10-CM

## 2019-05-07 LAB — INR PPP: 2.4 (ref 0.9–1.1)

## 2019-05-09 ENCOUNTER — COMMUNICATION - HEALTHEAST (OUTPATIENT)
Dept: INTERNAL MEDICINE | Facility: CLINIC | Age: 84
End: 2019-05-09

## 2019-05-19 ENCOUNTER — COMMUNICATION - HEALTHEAST (OUTPATIENT)
Dept: INTERNAL MEDICINE | Facility: CLINIC | Age: 84
End: 2019-05-19

## 2019-05-19 ENCOUNTER — COMMUNICATION - HEALTHEAST (OUTPATIENT)
Dept: PHYSICAL MEDICINE AND REHAB | Facility: CLINIC | Age: 84
End: 2019-05-19

## 2019-05-19 DIAGNOSIS — M54.16 LUMBAR RADICULITIS: ICD-10-CM

## 2019-05-23 ENCOUNTER — OFFICE VISIT - HEALTHEAST (OUTPATIENT)
Dept: INTERNAL MEDICINE | Facility: CLINIC | Age: 84
End: 2019-05-23

## 2019-05-23 ENCOUNTER — COMMUNICATION - HEALTHEAST (OUTPATIENT)
Dept: ANTICOAGULATION | Facility: CLINIC | Age: 84
End: 2019-05-23

## 2019-05-23 DIAGNOSIS — I10 ESSENTIAL HYPERTENSION WITH GOAL BLOOD PRESSURE LESS THAN 140/90: ICD-10-CM

## 2019-05-23 DIAGNOSIS — I26.99 OTHER ACUTE PULMONARY EMBOLISM WITHOUT ACUTE COR PULMONALE (H): ICD-10-CM

## 2019-05-23 DIAGNOSIS — Z86.711 HISTORY OF PULMONARY EMBOLISM: ICD-10-CM

## 2019-05-23 DIAGNOSIS — M62.81 GENERALIZED MUSCLE WEAKNESS: ICD-10-CM

## 2019-05-23 DIAGNOSIS — N32.81 OAB (OVERACTIVE BLADDER): ICD-10-CM

## 2019-05-23 DIAGNOSIS — I26.99 PULMONARY EMBOLISM (H): ICD-10-CM

## 2019-05-23 DIAGNOSIS — Z86.718 PERSONAL HISTORY OF DVT (DEEP VEIN THROMBOSIS): ICD-10-CM

## 2019-05-23 LAB — INR PPP: 2.3 (ref 0.9–1.1)

## 2019-05-23 ASSESSMENT — MIFFLIN-ST. JEOR: SCORE: 1143.03

## 2019-05-31 ENCOUNTER — RECORDS - HEALTHEAST (OUTPATIENT)
Dept: ADMINISTRATIVE | Facility: OTHER | Age: 84
End: 2019-05-31

## 2019-06-03 ENCOUNTER — OFFICE VISIT - HEALTHEAST (OUTPATIENT)
Dept: PHYSICAL THERAPY | Facility: REHABILITATION | Age: 84
End: 2019-06-03

## 2019-06-03 DIAGNOSIS — G89.29 CHRONIC BILATERAL LOW BACK PAIN WITH LEFT-SIDED SCIATICA: ICD-10-CM

## 2019-06-03 DIAGNOSIS — M54.42 CHRONIC BILATERAL LOW BACK PAIN WITH LEFT-SIDED SCIATICA: ICD-10-CM

## 2019-06-03 DIAGNOSIS — R29.898 BILATERAL LEG WEAKNESS: ICD-10-CM

## 2019-06-03 DIAGNOSIS — R26.81 UNSTEADINESS ON FEET: ICD-10-CM

## 2019-06-04 ENCOUNTER — RECORDS - HEALTHEAST (OUTPATIENT)
Dept: ADMINISTRATIVE | Facility: OTHER | Age: 84
End: 2019-06-04

## 2019-06-04 ENCOUNTER — COMMUNICATION - HEALTHEAST (OUTPATIENT)
Dept: ANTICOAGULATION | Facility: CLINIC | Age: 84
End: 2019-06-04

## 2019-06-04 ENCOUNTER — HOSPITAL ENCOUNTER (OUTPATIENT)
Dept: PHYSICAL MEDICINE AND REHAB | Facility: CLINIC | Age: 84
Discharge: HOME | End: 2019-06-04
Attending: PHYSICAL MEDICINE & REHABILITATION

## 2019-06-04 DIAGNOSIS — I26.99 OTHER ACUTE PULMONARY EMBOLISM WITHOUT ACUTE COR PULMONALE (H): ICD-10-CM

## 2019-06-04 DIAGNOSIS — M54.16 LUMBAR RADICULITIS: ICD-10-CM

## 2019-06-04 DIAGNOSIS — Z79.01 ON WARFARIN THERAPY: ICD-10-CM

## 2019-06-04 LAB — POC INR - HE - HISTORICAL: 1.9 (ref 0.9–1.1)

## 2019-06-12 ENCOUNTER — OFFICE VISIT - HEALTHEAST (OUTPATIENT)
Dept: PHYSICAL THERAPY | Facility: REHABILITATION | Age: 84
End: 2019-06-12

## 2019-06-12 DIAGNOSIS — R26.81 UNSTEADINESS ON FEET: ICD-10-CM

## 2019-06-12 DIAGNOSIS — M54.42 CHRONIC BILATERAL LOW BACK PAIN WITH LEFT-SIDED SCIATICA: ICD-10-CM

## 2019-06-12 DIAGNOSIS — R29.898 BILATERAL LEG WEAKNESS: ICD-10-CM

## 2019-06-12 DIAGNOSIS — G89.29 CHRONIC BILATERAL LOW BACK PAIN WITH LEFT-SIDED SCIATICA: ICD-10-CM

## 2019-06-19 ENCOUNTER — AMBULATORY - HEALTHEAST (OUTPATIENT)
Dept: LAB | Facility: CLINIC | Age: 84
End: 2019-06-19

## 2019-06-19 ENCOUNTER — OFFICE VISIT - HEALTHEAST (OUTPATIENT)
Dept: PHYSICAL THERAPY | Facility: REHABILITATION | Age: 84
End: 2019-06-19

## 2019-06-19 ENCOUNTER — COMMUNICATION - HEALTHEAST (OUTPATIENT)
Dept: ANTICOAGULATION | Facility: CLINIC | Age: 84
End: 2019-06-19

## 2019-06-19 DIAGNOSIS — M54.42 CHRONIC BILATERAL LOW BACK PAIN WITH LEFT-SIDED SCIATICA: ICD-10-CM

## 2019-06-19 DIAGNOSIS — R29.898 BILATERAL LEG WEAKNESS: ICD-10-CM

## 2019-06-19 DIAGNOSIS — I26.99 PULMONARY EMBOLISM (H): ICD-10-CM

## 2019-06-19 DIAGNOSIS — I26.99 OTHER ACUTE PULMONARY EMBOLISM WITHOUT ACUTE COR PULMONALE (H): ICD-10-CM

## 2019-06-19 DIAGNOSIS — G89.29 CHRONIC BILATERAL LOW BACK PAIN WITH LEFT-SIDED SCIATICA: ICD-10-CM

## 2019-06-19 DIAGNOSIS — R26.81 UNSTEADINESS ON FEET: ICD-10-CM

## 2019-06-19 LAB — INR PPP: 2.7 (ref 0.9–1.1)

## 2019-06-21 ENCOUNTER — COMMUNICATION - HEALTHEAST (OUTPATIENT)
Dept: INTERNAL MEDICINE | Facility: CLINIC | Age: 84
End: 2019-06-21

## 2019-06-24 ENCOUNTER — COMMUNICATION - HEALTHEAST (OUTPATIENT)
Dept: INTERNAL MEDICINE | Facility: CLINIC | Age: 84
End: 2019-06-24

## 2019-06-24 DIAGNOSIS — I10 ESSENTIAL HYPERTENSION: ICD-10-CM

## 2019-06-25 ENCOUNTER — COMMUNICATION - HEALTHEAST (OUTPATIENT)
Dept: INTERNAL MEDICINE | Facility: CLINIC | Age: 84
End: 2019-06-25

## 2019-06-25 DIAGNOSIS — N32.81 OAB (OVERACTIVE BLADDER): ICD-10-CM

## 2019-06-26 ENCOUNTER — OFFICE VISIT - HEALTHEAST (OUTPATIENT)
Dept: PHYSICAL THERAPY | Facility: REHABILITATION | Age: 84
End: 2019-06-26

## 2019-06-26 DIAGNOSIS — R26.81 UNSTEADINESS ON FEET: ICD-10-CM

## 2019-06-26 DIAGNOSIS — R29.898 BILATERAL LEG WEAKNESS: ICD-10-CM

## 2019-06-27 ENCOUNTER — RECORDS - HEALTHEAST (OUTPATIENT)
Dept: ADMINISTRATIVE | Facility: OTHER | Age: 84
End: 2019-06-27

## 2019-06-28 ENCOUNTER — COMMUNICATION - HEALTHEAST (OUTPATIENT)
Dept: ANTICOAGULATION | Facility: CLINIC | Age: 84
End: 2019-06-28

## 2019-06-28 DIAGNOSIS — I26.99 OTHER ACUTE PULMONARY EMBOLISM WITHOUT ACUTE COR PULMONALE (H): ICD-10-CM

## 2019-07-10 ENCOUNTER — COMMUNICATION - HEALTHEAST (OUTPATIENT)
Dept: ANTICOAGULATION | Facility: CLINIC | Age: 84
End: 2019-07-10

## 2019-07-10 ENCOUNTER — AMBULATORY - HEALTHEAST (OUTPATIENT)
Dept: LAB | Facility: CLINIC | Age: 84
End: 2019-07-10

## 2019-07-10 DIAGNOSIS — I26.99 PULMONARY EMBOLISM (H): ICD-10-CM

## 2019-07-10 DIAGNOSIS — I26.99 OTHER ACUTE PULMONARY EMBOLISM WITHOUT ACUTE COR PULMONALE (H): ICD-10-CM

## 2019-07-10 LAB — INR PPP: 4.8 (ref 0.9–1.1)

## 2019-07-12 ENCOUNTER — COMMUNICATION - HEALTHEAST (OUTPATIENT)
Dept: CARDIOLOGY | Facility: CLINIC | Age: 84
End: 2019-07-12

## 2019-07-15 ENCOUNTER — AMBULATORY - HEALTHEAST (OUTPATIENT)
Dept: LAB | Facility: CLINIC | Age: 84
End: 2019-07-15

## 2019-07-15 ENCOUNTER — COMMUNICATION - HEALTHEAST (OUTPATIENT)
Dept: ANTICOAGULATION | Facility: CLINIC | Age: 84
End: 2019-07-15

## 2019-07-15 DIAGNOSIS — I26.99 OTHER ACUTE PULMONARY EMBOLISM WITHOUT ACUTE COR PULMONALE (H): ICD-10-CM

## 2019-07-15 DIAGNOSIS — I26.99 PULMONARY EMBOLISM (H): ICD-10-CM

## 2019-07-15 LAB — INR PPP: 5.1 (ref 0.9–1.1)

## 2019-07-17 ENCOUNTER — COMMUNICATION - HEALTHEAST (OUTPATIENT)
Dept: INTERNAL MEDICINE | Facility: CLINIC | Age: 84
End: 2019-07-17

## 2019-07-18 ENCOUNTER — AMBULATORY - HEALTHEAST (OUTPATIENT)
Dept: LAB | Facility: CLINIC | Age: 84
End: 2019-07-18

## 2019-07-18 ENCOUNTER — COMMUNICATION - HEALTHEAST (OUTPATIENT)
Dept: ANTICOAGULATION | Facility: CLINIC | Age: 84
End: 2019-07-18

## 2019-07-18 DIAGNOSIS — I26.99 OTHER ACUTE PULMONARY EMBOLISM WITHOUT ACUTE COR PULMONALE (H): ICD-10-CM

## 2019-07-18 DIAGNOSIS — I26.99 PULMONARY EMBOLISM (H): ICD-10-CM

## 2019-07-18 LAB — INR PPP: 2 (ref 0.9–1.1)

## 2019-07-22 ENCOUNTER — COMMUNICATION - HEALTHEAST (OUTPATIENT)
Dept: ANTICOAGULATION | Facility: CLINIC | Age: 84
End: 2019-07-22

## 2019-07-22 ENCOUNTER — AMBULATORY - HEALTHEAST (OUTPATIENT)
Dept: CARDIOLOGY | Facility: CLINIC | Age: 84
End: 2019-07-22

## 2019-07-22 ENCOUNTER — AMBULATORY - HEALTHEAST (OUTPATIENT)
Dept: INTERNAL MEDICINE | Facility: CLINIC | Age: 84
End: 2019-07-22

## 2019-07-22 ENCOUNTER — OFFICE VISIT - HEALTHEAST (OUTPATIENT)
Dept: CARDIOLOGY | Facility: CLINIC | Age: 84
End: 2019-07-22

## 2019-07-22 DIAGNOSIS — R06.09 DOE (DYSPNEA ON EXERTION): ICD-10-CM

## 2019-07-22 DIAGNOSIS — Z86.711 HISTORY OF PULMONARY EMBOLISM: ICD-10-CM

## 2019-07-22 DIAGNOSIS — I65.8 OCCLUSION AND STENOSIS OF MULTIPLE AND BILATERAL PRECEREBRAL ARTERIES: ICD-10-CM

## 2019-07-22 DIAGNOSIS — M54.2 NECK PAIN: ICD-10-CM

## 2019-07-22 DIAGNOSIS — I82.402 ACUTE THROMBOEMBOLISM OF DEEP VEINS OF LEFT LOWER EXTREMITY (H): ICD-10-CM

## 2019-07-22 DIAGNOSIS — I25.83 CORONARY ATHEROSCLEROSIS DUE TO LIPID RICH PLAQUE: ICD-10-CM

## 2019-07-22 DIAGNOSIS — I10 ESSENTIAL HYPERTENSION WITH GOAL BLOOD PRESSURE LESS THAN 140/90: ICD-10-CM

## 2019-07-22 DIAGNOSIS — R42 DIZZINESS: ICD-10-CM

## 2019-07-22 DIAGNOSIS — D64.9 ANEMIA: ICD-10-CM

## 2019-07-22 LAB
ANION GAP SERPL CALCULATED.3IONS-SCNC: 12 MMOL/L (ref 5–18)
BNP SERPL-MCNC: 228 PG/ML (ref 0–167)
BUN SERPL-MCNC: 24 MG/DL (ref 8–28)
CALCIUM SERPL-MCNC: 10 MG/DL (ref 8.5–10.5)
CHLORIDE BLD-SCNC: 101 MMOL/L (ref 98–107)
CO2 SERPL-SCNC: 26 MMOL/L (ref 22–31)
CREAT SERPL-MCNC: 0.93 MG/DL (ref 0.6–1.1)
GFR SERPL CREATININE-BSD FRML MDRD: 57 ML/MIN/1.73M2
GLUCOSE BLD-MCNC: 109 MG/DL (ref 70–125)
HGB BLD-MCNC: 9.3 G/DL (ref 12–16)
POC INR - HE - HISTORICAL: 1.8 (ref 0.9–1.1)
POTASSIUM BLD-SCNC: 3.4 MMOL/L (ref 3.5–5)
SODIUM SERPL-SCNC: 139 MMOL/L (ref 136–145)

## 2019-07-22 ASSESSMENT — MIFFLIN-ST. JEOR: SCORE: 1120.35

## 2019-07-23 LAB
ATRIAL RATE - MUSE: 94 BPM
DIASTOLIC BLOOD PRESSURE - MUSE: NORMAL MMHG
INTERPRETATION ECG - MUSE: NORMAL
P AXIS - MUSE: NORMAL DEGREES
PR INTERVAL - MUSE: NORMAL MS
QRS DURATION - MUSE: 148 MS
QT - MUSE: 398 MS
QTC - MUSE: 497 MS
R AXIS - MUSE: -49 DEGREES
SYSTOLIC BLOOD PRESSURE - MUSE: NORMAL MMHG
T AXIS - MUSE: 117 DEGREES
VENTRICULAR RATE- MUSE: 94 BPM

## 2019-07-24 ENCOUNTER — OFFICE VISIT - HEALTHEAST (OUTPATIENT)
Dept: INTERNAL MEDICINE | Facility: CLINIC | Age: 84
End: 2019-07-24

## 2019-07-24 DIAGNOSIS — R29.898 BILATERAL LEG WEAKNESS: ICD-10-CM

## 2019-07-24 DIAGNOSIS — D50.8 IRON DEFICIENCY ANEMIA SECONDARY TO INADEQUATE DIETARY IRON INTAKE: ICD-10-CM

## 2019-07-24 DIAGNOSIS — R42 DIZZINESS: ICD-10-CM

## 2019-07-24 ASSESSMENT — MIFFLIN-ST. JEOR: SCORE: 1124.89

## 2019-07-25 ENCOUNTER — COMMUNICATION - HEALTHEAST (OUTPATIENT)
Dept: INTERNAL MEDICINE | Facility: CLINIC | Age: 84
End: 2019-07-25

## 2019-07-25 ENCOUNTER — COMMUNICATION - HEALTHEAST (OUTPATIENT)
Dept: ANTICOAGULATION | Facility: CLINIC | Age: 84
End: 2019-07-25

## 2019-07-25 DIAGNOSIS — Z86.711 HISTORY OF PULMONARY EMBOLISM: ICD-10-CM

## 2019-07-25 DIAGNOSIS — I82.402 ACUTE THROMBOEMBOLISM OF DEEP VEINS OF LEFT LOWER EXTREMITY (H): ICD-10-CM

## 2019-07-26 ENCOUNTER — AMBULATORY - HEALTHEAST (OUTPATIENT)
Dept: LAB | Facility: CLINIC | Age: 84
End: 2019-07-26

## 2019-07-26 ENCOUNTER — COMMUNICATION - HEALTHEAST (OUTPATIENT)
Dept: ANTICOAGULATION | Facility: CLINIC | Age: 84
End: 2019-07-26

## 2019-07-26 ENCOUNTER — COMMUNICATION - HEALTHEAST (OUTPATIENT)
Dept: INTERNAL MEDICINE | Facility: CLINIC | Age: 84
End: 2019-07-26

## 2019-07-26 DIAGNOSIS — I82.402 ACUTE THROMBOEMBOLISM OF DEEP VEINS OF LEFT LOWER EXTREMITY (H): ICD-10-CM

## 2019-07-26 DIAGNOSIS — Z86.711 HISTORY OF PULMONARY EMBOLISM: ICD-10-CM

## 2019-07-26 LAB — INR PPP: 3.8 (ref 0.9–1.1)

## 2019-08-05 ENCOUNTER — COMMUNICATION - HEALTHEAST (OUTPATIENT)
Dept: ANTICOAGULATION | Facility: CLINIC | Age: 84
End: 2019-08-05

## 2019-08-05 ENCOUNTER — AMBULATORY - HEALTHEAST (OUTPATIENT)
Dept: LAB | Facility: CLINIC | Age: 84
End: 2019-08-05

## 2019-08-05 DIAGNOSIS — I82.402 ACUTE THROMBOEMBOLISM OF DEEP VEINS OF LEFT LOWER EXTREMITY (H): ICD-10-CM

## 2019-08-05 DIAGNOSIS — Z86.711 HISTORY OF PULMONARY EMBOLISM: ICD-10-CM

## 2019-08-05 LAB — INR PPP: 2.8 (ref 0.9–1.1)

## 2019-08-06 ENCOUNTER — RECORDS - HEALTHEAST (OUTPATIENT)
Dept: ADMINISTRATIVE | Facility: OTHER | Age: 84
End: 2019-08-06

## 2019-08-15 ENCOUNTER — COMMUNICATION - HEALTHEAST (OUTPATIENT)
Dept: INTERNAL MEDICINE | Facility: CLINIC | Age: 84
End: 2019-08-15

## 2019-08-19 ENCOUNTER — COMMUNICATION - HEALTHEAST (OUTPATIENT)
Dept: ANTICOAGULATION | Facility: CLINIC | Age: 84
End: 2019-08-19

## 2019-08-19 ENCOUNTER — OFFICE VISIT - HEALTHEAST (OUTPATIENT)
Dept: CARDIOLOGY | Facility: CLINIC | Age: 84
End: 2019-08-19

## 2019-08-19 ENCOUNTER — AMBULATORY - HEALTHEAST (OUTPATIENT)
Dept: CARDIOLOGY | Facility: CLINIC | Age: 84
End: 2019-08-19

## 2019-08-19 DIAGNOSIS — I82.402 ACUTE THROMBOEMBOLISM OF DEEP VEINS OF LEFT LOWER EXTREMITY (H): ICD-10-CM

## 2019-08-19 DIAGNOSIS — D50.8 IRON DEFICIENCY ANEMIA SECONDARY TO INADEQUATE DIETARY IRON INTAKE: ICD-10-CM

## 2019-08-19 DIAGNOSIS — I25.83 CORONARY ATHEROSCLEROSIS DUE TO LIPID RICH PLAQUE: ICD-10-CM

## 2019-08-19 DIAGNOSIS — Z86.711 HISTORY OF PULMONARY EMBOLISM: ICD-10-CM

## 2019-08-19 DIAGNOSIS — I10 ESSENTIAL HYPERTENSION WITH GOAL BLOOD PRESSURE LESS THAN 140/90: ICD-10-CM

## 2019-08-19 DIAGNOSIS — E78.00 HYPERCHOLESTEREMIA: ICD-10-CM

## 2019-08-19 DIAGNOSIS — E66.9 OBESITY, UNSPECIFIED: ICD-10-CM

## 2019-08-19 DIAGNOSIS — R06.09 DOE (DYSPNEA ON EXERTION): ICD-10-CM

## 2019-08-19 LAB
ANION GAP SERPL CALCULATED.3IONS-SCNC: 9 MMOL/L (ref 5–18)
BUN SERPL-MCNC: 22 MG/DL (ref 8–28)
CALCIUM SERPL-MCNC: 9.9 MG/DL (ref 8.5–10.5)
CHLORIDE BLD-SCNC: 103 MMOL/L (ref 98–107)
CO2 SERPL-SCNC: 28 MMOL/L (ref 22–31)
CREAT SERPL-MCNC: 0.91 MG/DL (ref 0.6–1.1)
GFR SERPL CREATININE-BSD FRML MDRD: 59 ML/MIN/1.73M2
GLUCOSE BLD-MCNC: 92 MG/DL (ref 70–125)
POC INR - HE - HISTORICAL: 1.8 (ref 0.9–1.1)
POTASSIUM BLD-SCNC: 4.2 MMOL/L (ref 3.5–5)
SODIUM SERPL-SCNC: 140 MMOL/L (ref 136–145)

## 2019-08-19 ASSESSMENT — MIFFLIN-ST. JEOR: SCORE: 1133.96

## 2019-08-20 ENCOUNTER — COMMUNICATION - HEALTHEAST (OUTPATIENT)
Dept: CARDIOLOGY | Facility: CLINIC | Age: 84
End: 2019-08-20

## 2019-08-26 ENCOUNTER — HOSPITAL ENCOUNTER (OUTPATIENT)
Dept: CARDIOLOGY | Facility: CLINIC | Age: 84
Discharge: HOME | End: 2019-08-26
Attending: INTERNAL MEDICINE

## 2019-08-26 ENCOUNTER — COMMUNICATION - HEALTHEAST (OUTPATIENT)
Dept: PHYSICAL MEDICINE AND REHAB | Facility: CLINIC | Age: 84
End: 2019-08-26

## 2019-08-26 DIAGNOSIS — R06.09 OTHER FORMS OF DYSPNEA: ICD-10-CM

## 2019-08-26 DIAGNOSIS — R06.09 DOE (DYSPNEA ON EXERTION): ICD-10-CM

## 2019-08-26 ASSESSMENT — MIFFLIN-ST. JEOR: SCORE: 1093.13

## 2019-08-27 LAB
AORTIC VALVE MEAN VELOCITY: 100 CM/S
AV DIMENSIONLESS INDEX VTI: 0.6
AV MEAN GRADIENT: 4 MMHG
AV PEAK GRADIENT: 7.6 MMHG
AV VALVE AREA: 1.9 CM2
AV VELOCITY RATIO: 0.7
BSA FOR ECHO PROCEDURE: 1.75 M2
CV BLOOD PRESSURE: NORMAL MMHG
CV ECHO HEIGHT: 63 IN
CV ECHO WEIGHT: 153 LBS
DOP CALC AO PEAK VEL: 138 CM/S
DOP CALC AO VTI: 28.5 CM
DOP CALC LVOT AREA: 3.14 CM2
DOP CALC LVOT DIAMETER: 2 CM
DOP CALC LVOT PEAK VEL: 92.8 CM/S
DOP CALC LVOT STROKE VOLUME: 54 CM3
DOP CALCLVOT PEAK VEL VTI: 17.2 CM
EJECTION FRACTION: 54 % (ref 55–75)
LA AREA 1: 27.1 CM2
LA AREA 2: 20.6 CM2
LEFT ATRIUM LENGTH: 6.38 CM
LEFT ATRIUM SIZE: 3.5 CM
LEFT ATRIUM VOLUME INDEX: 42.5 ML/M2
LEFT ATRIUM VOLUME: 74.4 ML
LEFT VENTRICLE DIASTOLIC VOLUME INDEX: 48 CM3/M2 (ref 29–61)
LEFT VENTRICLE DIASTOLIC VOLUME: 84 CM3 (ref 46–106)
LEFT VENTRICLE SYSTOLIC VOLUME INDEX: 22.3 CM3/M2 (ref 8–24)
LEFT VENTRICLE SYSTOLIC VOLUME: 39 CM3 (ref 14–42)
LEFT VENTRICULAR OUTFLOW TRACT MEAN GRADIENT: 2 MMHG
LEFT VENTRICULAR OUTFLOW TRACT MEAN VELOCITY: 66.3 CM/S
LEFT VENTRICULAR OUTFLOW TRACT PEAK GRADIENT: 3 MMHG
LV STROKE VOLUME INDEX: 30.9 ML/M2
MITRAL VALVE E/A RATIO: 0.5
MV AVERAGE E/E' RATIO: 13.8 CM/S
MV DECELERATION TIME: 210 MS
MV E'TISSUE VEL-LAT: 3.02 CM/S
MV E'TISSUE VEL-MED: 3.41 CM/S
MV LATERAL E/E' RATIO: 14.7
MV MEDIAL E/E' RATIO: 13
MV PEAK A VELOCITY: 95.3 CM/S
MV PEAK E VELOCITY: 44.4 CM/S
NUC REST DIASTOLIC VOLUME INDEX: 2448 LBS
NUC REST SYSTOLIC VOLUME INDEX: 63 IN
TRICUSPID REGURGITATION PEAK PRESSURE GRADIENT: 26.8 MMHG
TRICUSPID VALVE ANULAR PLANE SYSTOLIC EXCURSION: 2.3 CM
TRICUSPID VALVE PEAK REGURGITANT VELOCITY: 259 CM/S

## 2019-08-29 ENCOUNTER — COMMUNICATION - HEALTHEAST (OUTPATIENT)
Dept: PHYSICAL MEDICINE AND REHAB | Facility: CLINIC | Age: 84
End: 2019-08-29

## 2019-08-29 DIAGNOSIS — M54.16 LUMBAR RADICULOPATHY: ICD-10-CM

## 2019-09-03 ENCOUNTER — COMMUNICATION - HEALTHEAST (OUTPATIENT)
Dept: PHYSICAL MEDICINE AND REHAB | Facility: CLINIC | Age: 84
End: 2019-09-03

## 2019-09-04 ENCOUNTER — COMMUNICATION - HEALTHEAST (OUTPATIENT)
Dept: PHYSICAL MEDICINE AND REHAB | Facility: CLINIC | Age: 84
End: 2019-09-04

## 2019-09-05 ENCOUNTER — COMMUNICATION - HEALTHEAST (OUTPATIENT)
Dept: ANTICOAGULATION | Facility: CLINIC | Age: 84
End: 2019-09-05

## 2019-09-10 ENCOUNTER — AMBULATORY - HEALTHEAST (OUTPATIENT)
Dept: LAB | Facility: CLINIC | Age: 84
End: 2019-09-10

## 2019-09-10 ENCOUNTER — COMMUNICATION - HEALTHEAST (OUTPATIENT)
Dept: ANTICOAGULATION | Facility: CLINIC | Age: 84
End: 2019-09-10

## 2019-09-10 DIAGNOSIS — I82.402 ACUTE THROMBOEMBOLISM OF DEEP VEINS OF LEFT LOWER EXTREMITY (H): ICD-10-CM

## 2019-09-10 DIAGNOSIS — Z86.711 HISTORY OF PULMONARY EMBOLISM: ICD-10-CM

## 2019-09-10 LAB — INR PPP: 1.65 (ref 0.9–1.1)

## 2019-09-13 ENCOUNTER — COMMUNICATION - HEALTHEAST (OUTPATIENT)
Dept: ANTICOAGULATION | Facility: CLINIC | Age: 84
End: 2019-09-13

## 2019-09-13 ENCOUNTER — HOSPITAL ENCOUNTER (OUTPATIENT)
Dept: PHYSICAL MEDICINE AND REHAB | Facility: CLINIC | Age: 84
Discharge: HOME | End: 2019-09-13
Attending: PHYSICIAN ASSISTANT

## 2019-09-13 DIAGNOSIS — Z79.01 ON WARFARIN THERAPY: ICD-10-CM

## 2019-09-13 DIAGNOSIS — M54.16 LUMBAR RADICULOPATHY: ICD-10-CM

## 2019-09-13 LAB — POC INR - HE - HISTORICAL: 1.9 (ref 0.9–1.1)

## 2019-09-23 ENCOUNTER — COMMUNICATION - HEALTHEAST (OUTPATIENT)
Dept: ANTICOAGULATION | Facility: CLINIC | Age: 84
End: 2019-09-23

## 2019-09-23 ENCOUNTER — OFFICE VISIT - HEALTHEAST (OUTPATIENT)
Dept: INTERNAL MEDICINE | Facility: CLINIC | Age: 84
End: 2019-09-23

## 2019-09-23 DIAGNOSIS — M94.9 DISORDER OF BONE AND CARTILAGE: ICD-10-CM

## 2019-09-23 DIAGNOSIS — Z23 FLU VACCINE NEED: ICD-10-CM

## 2019-09-23 DIAGNOSIS — Z86.711 HISTORY OF PULMONARY EMBOLISM: ICD-10-CM

## 2019-09-23 DIAGNOSIS — M89.9 DISORDER OF BONE AND CARTILAGE: ICD-10-CM

## 2019-09-23 DIAGNOSIS — M54.16 LEFT LUMBAR RADICULOPATHY: ICD-10-CM

## 2019-09-23 DIAGNOSIS — I25.83 CORONARY ATHEROSCLEROSIS DUE TO LIPID RICH PLAQUE: ICD-10-CM

## 2019-09-23 DIAGNOSIS — I10 ESSENTIAL HYPERTENSION WITH GOAL BLOOD PRESSURE LESS THAN 140/90: ICD-10-CM

## 2019-09-23 DIAGNOSIS — E78.00 HYPERCHOLESTEREMIA: ICD-10-CM

## 2019-09-23 DIAGNOSIS — R06.09 DOE (DYSPNEA ON EXERTION): ICD-10-CM

## 2019-09-23 LAB
ALBUMIN SERPL-MCNC: 4.1 G/DL (ref 3.5–5)
ALP SERPL-CCNC: 73 U/L (ref 45–120)
ALT SERPL W P-5'-P-CCNC: 18 U/L (ref 0–45)
ANION GAP SERPL CALCULATED.3IONS-SCNC: 12 MMOL/L (ref 5–18)
AST SERPL W P-5'-P-CCNC: 23 U/L (ref 0–40)
BILIRUB DIRECT SERPL-MCNC: 0.2 MG/DL
BILIRUB SERPL-MCNC: 0.5 MG/DL (ref 0–1)
BUN SERPL-MCNC: 21 MG/DL (ref 8–28)
CALCIUM SERPL-MCNC: 10 MG/DL (ref 8.5–10.5)
CHLORIDE BLD-SCNC: 102 MMOL/L (ref 98–107)
CHOLEST SERPL-MCNC: 170 MG/DL
CO2 SERPL-SCNC: 27 MMOL/L (ref 22–31)
CREAT SERPL-MCNC: 0.81 MG/DL (ref 0.6–1.1)
ERYTHROCYTE [DISTWIDTH] IN BLOOD BY AUTOMATED COUNT: 18 % (ref 11–14.5)
FASTING STATUS PATIENT QL REPORTED: YES
GFR SERPL CREATININE-BSD FRML MDRD: >60 ML/MIN/1.73M2
GLUCOSE BLD-MCNC: 83 MG/DL (ref 70–125)
HCT VFR BLD AUTO: 41.6 % (ref 35–47)
HDLC SERPL-MCNC: 76 MG/DL
HGB BLD-MCNC: 13.5 G/DL (ref 12–16)
INR PPP: 1.8 (ref 0.9–1.1)
LDLC SERPL CALC-MCNC: 77 MG/DL
MCH RBC QN AUTO: 29.1 PG (ref 27–34)
MCHC RBC AUTO-ENTMCNC: 32.5 G/DL (ref 32–36)
MCV RBC AUTO: 90 FL (ref 80–100)
PLATELET # BLD AUTO: 323 THOU/UL (ref 140–440)
PMV BLD AUTO: 8.6 FL (ref 7–10)
POTASSIUM BLD-SCNC: 3.6 MMOL/L (ref 3.5–5)
PROT SERPL-MCNC: 7.1 G/DL (ref 6–8)
RBC # BLD AUTO: 4.65 MILL/UL (ref 3.8–5.4)
SODIUM SERPL-SCNC: 141 MMOL/L (ref 136–145)
TRIGL SERPL-MCNC: 87 MG/DL
WBC: 5.5 THOU/UL (ref 4–11)

## 2019-09-23 ASSESSMENT — MIFFLIN-ST. JEOR: SCORE: 1120.35

## 2019-09-24 ENCOUNTER — COMMUNICATION - HEALTHEAST (OUTPATIENT)
Dept: INTERNAL MEDICINE | Facility: CLINIC | Age: 84
End: 2019-09-24

## 2019-09-24 LAB — 25(OH)D3 SERPL-MCNC: 36.9 NG/ML (ref 30–80)

## 2019-10-07 ENCOUNTER — COMMUNICATION - HEALTHEAST (OUTPATIENT)
Dept: ANTICOAGULATION | Facility: CLINIC | Age: 84
End: 2019-10-07

## 2019-10-07 ENCOUNTER — AMBULATORY - HEALTHEAST (OUTPATIENT)
Dept: LAB | Facility: CLINIC | Age: 84
End: 2019-10-07

## 2019-10-07 DIAGNOSIS — Z86.711 HISTORY OF PULMONARY EMBOLISM: ICD-10-CM

## 2019-10-07 LAB — INR PPP: 1.6 (ref 0.9–1.1)

## 2019-10-17 ENCOUNTER — AMBULATORY - HEALTHEAST (OUTPATIENT)
Dept: LAB | Facility: CLINIC | Age: 84
End: 2019-10-17

## 2019-10-17 ENCOUNTER — COMMUNICATION - HEALTHEAST (OUTPATIENT)
Dept: ANTICOAGULATION | Facility: CLINIC | Age: 84
End: 2019-10-17

## 2019-10-17 DIAGNOSIS — Z86.711 HISTORY OF PULMONARY EMBOLISM: ICD-10-CM

## 2019-10-17 LAB — INR PPP: 1.1 (ref 0.9–1.1)

## 2019-10-29 ENCOUNTER — COMMUNICATION - HEALTHEAST (OUTPATIENT)
Dept: ANTICOAGULATION | Facility: CLINIC | Age: 84
End: 2019-10-29

## 2019-10-29 ENCOUNTER — AMBULATORY - HEALTHEAST (OUTPATIENT)
Dept: LAB | Facility: CLINIC | Age: 84
End: 2019-10-29

## 2019-10-29 DIAGNOSIS — Z86.711 HISTORY OF PULMONARY EMBOLISM: ICD-10-CM

## 2019-10-29 LAB — INR PPP: 3.5 (ref 0.9–1.1)

## 2019-11-11 ENCOUNTER — AMBULATORY - HEALTHEAST (OUTPATIENT)
Dept: LAB | Facility: CLINIC | Age: 84
End: 2019-11-11

## 2019-11-11 ENCOUNTER — COMMUNICATION - HEALTHEAST (OUTPATIENT)
Dept: ANTICOAGULATION | Facility: CLINIC | Age: 84
End: 2019-11-11

## 2019-11-11 DIAGNOSIS — Z86.711 HISTORY OF PULMONARY EMBOLISM: ICD-10-CM

## 2019-11-11 LAB — INR PPP: 2.3 (ref 0.9–1.1)

## 2019-11-20 ENCOUNTER — COMMUNICATION - HEALTHEAST (OUTPATIENT)
Dept: CARDIOLOGY | Facility: CLINIC | Age: 84
End: 2019-11-20

## 2019-11-20 DIAGNOSIS — Z95.5 S/P CORONARY ARTERY STENT PLACEMENT: ICD-10-CM

## 2019-11-25 ENCOUNTER — COMMUNICATION - HEALTHEAST (OUTPATIENT)
Dept: PHYSICAL MEDICINE AND REHAB | Facility: CLINIC | Age: 84
End: 2019-11-25

## 2019-11-25 DIAGNOSIS — M54.16 LUMBAR RADICULITIS: ICD-10-CM

## 2019-11-26 ENCOUNTER — AMBULATORY - HEALTHEAST (OUTPATIENT)
Dept: LAB | Facility: CLINIC | Age: 84
End: 2019-11-26

## 2019-11-26 ENCOUNTER — COMMUNICATION - HEALTHEAST (OUTPATIENT)
Dept: ANTICOAGULATION | Facility: CLINIC | Age: 84
End: 2019-11-26

## 2019-11-26 DIAGNOSIS — Z86.711 HISTORY OF PULMONARY EMBOLISM: ICD-10-CM

## 2019-11-26 LAB — INR PPP: 3.5 (ref 0.9–1.1)

## 2019-12-02 ENCOUNTER — HOSPITAL ENCOUNTER (OUTPATIENT)
Dept: PHYSICAL MEDICINE AND REHAB | Facility: CLINIC | Age: 84
Discharge: HOME | End: 2019-12-02
Attending: PHYSICAL MEDICINE & REHABILITATION

## 2019-12-02 DIAGNOSIS — M48.061 STENOSIS OF LATERAL RECESS OF LUMBAR SPINE: ICD-10-CM

## 2019-12-02 DIAGNOSIS — M47.812 FACET ARTHROPATHY, CERVICAL: ICD-10-CM

## 2019-12-02 DIAGNOSIS — M79.18 MYOFASCIAL PAIN: ICD-10-CM

## 2019-12-02 DIAGNOSIS — G89.29 CHRONIC LEFT-SIDED LOW BACK PAIN WITH LEFT-SIDED SCIATICA: ICD-10-CM

## 2019-12-02 DIAGNOSIS — M54.42 CHRONIC LEFT-SIDED LOW BACK PAIN WITH LEFT-SIDED SCIATICA: ICD-10-CM

## 2019-12-02 DIAGNOSIS — M54.2 NECK PAIN: ICD-10-CM

## 2019-12-02 DIAGNOSIS — M48.061 LUMBAR FORAMINAL STENOSIS: ICD-10-CM

## 2019-12-02 DIAGNOSIS — M47.816 LUMBAR FACET ARTHROPATHY: ICD-10-CM

## 2019-12-09 ENCOUNTER — COMMUNICATION - HEALTHEAST (OUTPATIENT)
Dept: ANTICOAGULATION | Facility: CLINIC | Age: 84
End: 2019-12-09

## 2019-12-09 ENCOUNTER — HOSPITAL ENCOUNTER (OUTPATIENT)
Dept: PHYSICAL MEDICINE AND REHAB | Facility: CLINIC | Age: 84
Discharge: HOME | End: 2019-12-09
Attending: PHYSICAL MEDICINE & REHABILITATION

## 2019-12-09 ENCOUNTER — AMBULATORY - HEALTHEAST (OUTPATIENT)
Dept: PHYSICAL MEDICINE AND REHAB | Facility: CLINIC | Age: 84
End: 2019-12-09

## 2019-12-09 DIAGNOSIS — M54.16 LUMBAR RADICULITIS: ICD-10-CM

## 2019-12-09 DIAGNOSIS — Z79.01 LONG TERM CURRENT USE OF ANTICOAGULANT THERAPY: ICD-10-CM

## 2019-12-09 LAB — POC INR - HE - HISTORICAL: 1.2 (ref 0.9–1.1)

## 2019-12-16 ENCOUNTER — AMBULATORY - HEALTHEAST (OUTPATIENT)
Dept: LAB | Facility: CLINIC | Age: 84
End: 2019-12-16

## 2019-12-16 ENCOUNTER — COMMUNICATION - HEALTHEAST (OUTPATIENT)
Dept: ANTICOAGULATION | Facility: CLINIC | Age: 84
End: 2019-12-16

## 2019-12-16 DIAGNOSIS — Z86.711 HISTORY OF PULMONARY EMBOLISM: ICD-10-CM

## 2019-12-16 LAB — INR PPP: 1.9 (ref 0.9–1.1)

## 2019-12-19 ENCOUNTER — COMMUNICATION - HEALTHEAST (OUTPATIENT)
Dept: INTERNAL MEDICINE | Facility: CLINIC | Age: 84
End: 2019-12-19

## 2019-12-19 DIAGNOSIS — I10 ESSENTIAL HYPERTENSION WITH GOAL BLOOD PRESSURE LESS THAN 140/90: ICD-10-CM

## 2019-12-24 ENCOUNTER — COMMUNICATION - HEALTHEAST (OUTPATIENT)
Dept: PHYSICAL MEDICINE AND REHAB | Facility: CLINIC | Age: 84
End: 2019-12-24

## 2020-01-01 ENCOUNTER — COMMUNICATION - HEALTHEAST (OUTPATIENT)
Dept: ANTICOAGULATION | Facility: CLINIC | Age: 85
End: 2020-01-01

## 2020-01-01 ENCOUNTER — COMMUNICATION - HEALTHEAST (OUTPATIENT)
Dept: PHYSICAL THERAPY | Facility: REHABILITATION | Age: 85
End: 2020-01-01

## 2020-01-01 ENCOUNTER — COMMUNICATION - HEALTHEAST (OUTPATIENT)
Dept: PHYSICAL MEDICINE AND REHAB | Facility: CLINIC | Age: 85
End: 2020-01-01

## 2020-01-01 ENCOUNTER — COMMUNICATION - HEALTHEAST (OUTPATIENT)
Dept: INTERNAL MEDICINE | Facility: CLINIC | Age: 85
End: 2020-01-01

## 2020-01-01 ENCOUNTER — AMBULATORY - HEALTHEAST (OUTPATIENT)
Dept: LAB | Facility: CLINIC | Age: 85
End: 2020-01-01

## 2020-01-01 ENCOUNTER — HOSPITAL ENCOUNTER (OUTPATIENT)
Dept: PHYSICAL MEDICINE AND REHAB | Facility: CLINIC | Age: 85
Discharge: HOME | End: 2020-06-18
Attending: PHYSICAL MEDICINE & REHABILITATION

## 2020-01-01 ENCOUNTER — HOSPITAL ENCOUNTER (OUTPATIENT)
Dept: PHYSICAL MEDICINE AND REHAB | Facility: CLINIC | Age: 85
Discharge: HOME | End: 2020-06-16
Attending: PHYSICAL MEDICINE & REHABILITATION

## 2020-01-01 ENCOUNTER — COMMUNICATION - HEALTHEAST (OUTPATIENT)
Dept: CARDIOLOGY | Facility: CLINIC | Age: 85
End: 2020-01-01

## 2020-01-01 ENCOUNTER — RECORDS - HEALTHEAST (OUTPATIENT)
Dept: ANTICOAGULATION | Facility: CLINIC | Age: 85
End: 2020-01-01

## 2020-01-01 ENCOUNTER — HOSPITAL ENCOUNTER (OUTPATIENT)
Dept: PHYSICAL MEDICINE AND REHAB | Facility: CLINIC | Age: 85
Discharge: HOME | End: 2020-09-15
Attending: PHYSICAL MEDICINE & REHABILITATION

## 2020-01-01 ENCOUNTER — OFFICE VISIT - HEALTHEAST (OUTPATIENT)
Dept: CARDIOLOGY | Facility: CLINIC | Age: 85
End: 2020-01-01

## 2020-01-01 ENCOUNTER — AMBULATORY - HEALTHEAST (OUTPATIENT)
Dept: PHYSICAL MEDICINE AND REHAB | Facility: CLINIC | Age: 85
End: 2020-01-01

## 2020-01-01 ENCOUNTER — HOSPITAL ENCOUNTER (OUTPATIENT)
Dept: PHYSICAL MEDICINE AND REHAB | Facility: CLINIC | Age: 85
Discharge: HOME | End: 2020-12-11
Attending: PHYSICAL MEDICINE & REHABILITATION

## 2020-01-01 ENCOUNTER — RECORDS - HEALTHEAST (OUTPATIENT)
Dept: LAB | Facility: CLINIC | Age: 85
End: 2020-01-01

## 2020-01-01 ENCOUNTER — OFFICE VISIT - HEALTHEAST (OUTPATIENT)
Dept: PHYSICAL THERAPY | Facility: REHABILITATION | Age: 85
End: 2020-01-01

## 2020-01-01 ENCOUNTER — HOSPITAL ENCOUNTER (OUTPATIENT)
Dept: PHYSICAL MEDICINE AND REHAB | Facility: CLINIC | Age: 85
Discharge: HOME | End: 2020-12-29
Attending: PHYSICAL MEDICINE & REHABILITATION

## 2020-01-01 ENCOUNTER — HOSPITAL ENCOUNTER (OUTPATIENT)
Dept: PHYSICAL MEDICINE AND REHAB | Facility: CLINIC | Age: 85
Discharge: HOME | End: 2020-12-08
Attending: PHYSICAL MEDICINE & REHABILITATION

## 2020-01-01 DIAGNOSIS — M99.05 PELVIC REGION SOMATIC DYSFUNCTION: ICD-10-CM

## 2020-01-01 DIAGNOSIS — M47.812 FACET ARTHROPATHY, CERVICAL: ICD-10-CM

## 2020-01-01 DIAGNOSIS — Z79.01 BLOOD THINNED DUE TO LONG-TERM ANTICOAGULANT USE: ICD-10-CM

## 2020-01-01 DIAGNOSIS — M99.02 THORACIC REGION SOMATIC DYSFUNCTION: ICD-10-CM

## 2020-01-01 DIAGNOSIS — Z79.01 LONG TERM CURRENT USE OF ANTICOAGULANT THERAPY: ICD-10-CM

## 2020-01-01 DIAGNOSIS — M54.16 LUMBAR RADICULOPATHY: ICD-10-CM

## 2020-01-01 DIAGNOSIS — I27.82 CHRONIC PULMONARY EMBOLISM WITHOUT ACUTE COR PULMONALE, UNSPECIFIED PULMONARY EMBOLISM TYPE (H): ICD-10-CM

## 2020-01-01 DIAGNOSIS — Z86.711 HISTORY OF PULMONARY EMBOLISM: ICD-10-CM

## 2020-01-01 DIAGNOSIS — M48.061 LUMBAR FORAMINAL STENOSIS: ICD-10-CM

## 2020-01-01 DIAGNOSIS — Z79.01 LONG TERM (CURRENT) USE OF ANTICOAGULANTS: ICD-10-CM

## 2020-01-01 DIAGNOSIS — Z79.01 ON WARFARIN THERAPY: ICD-10-CM

## 2020-01-01 DIAGNOSIS — I65.8 OCCLUSION AND STENOSIS OF MULTIPLE AND BILATERAL PRECEREBRAL ARTERIES: ICD-10-CM

## 2020-01-01 DIAGNOSIS — M54.2 CERVICALGIA: ICD-10-CM

## 2020-01-01 DIAGNOSIS — R06.09 DOE (DYSPNEA ON EXERTION): ICD-10-CM

## 2020-01-01 DIAGNOSIS — I82.402 ACUTE THROMBOEMBOLISM OF DEEP VEINS OF LEFT LOWER EXTREMITY (H): ICD-10-CM

## 2020-01-01 DIAGNOSIS — I10 ESSENTIAL HYPERTENSION: ICD-10-CM

## 2020-01-01 DIAGNOSIS — I25.83 CORONARY ATHEROSCLEROSIS DUE TO LIPID RICH PLAQUE: ICD-10-CM

## 2020-01-01 DIAGNOSIS — M99.08 RIB CAGE REGION SOMATIC DYSFUNCTION: ICD-10-CM

## 2020-01-01 DIAGNOSIS — M99.03 LUMBAR REGION SOMATIC DYSFUNCTION: ICD-10-CM

## 2020-01-01 DIAGNOSIS — Z86.718 PERSONAL HISTORY OF DVT (DEEP VEIN THROMBOSIS): ICD-10-CM

## 2020-01-01 DIAGNOSIS — M79.18 MYOFASCIAL PAIN: ICD-10-CM

## 2020-01-01 DIAGNOSIS — I10 ESSENTIAL HYPERTENSION WITH GOAL BLOOD PRESSURE LESS THAN 140/90: ICD-10-CM

## 2020-01-01 DIAGNOSIS — M99.00 HEAD REGION SOMATIC DYSFUNCTION: ICD-10-CM

## 2020-01-01 DIAGNOSIS — M99.01 CERVICAL SOMATIC DYSFUNCTION: ICD-10-CM

## 2020-01-01 DIAGNOSIS — E78.00 HYPERCHOLESTEREMIA: ICD-10-CM

## 2020-01-01 DIAGNOSIS — M54.2 NECK PAIN: ICD-10-CM

## 2020-01-01 DIAGNOSIS — M99.04 SACRAL REGION SOMATIC DYSFUNCTION: ICD-10-CM

## 2020-01-01 LAB
ALBUMIN SERPL-MCNC: 3.5 G/DL (ref 3.5–5)
ALP SERPL-CCNC: 69 U/L (ref 45–120)
ALT SERPL W P-5'-P-CCNC: 13 U/L (ref 0–45)
ANION GAP SERPL CALCULATED.3IONS-SCNC: 9 MMOL/L (ref 5–18)
AST SERPL W P-5'-P-CCNC: 17 U/L (ref 0–40)
BILIRUB SERPL-MCNC: 0.6 MG/DL (ref 0–1)
BNP SERPL-MCNC: 300 PG/ML (ref 0–167)
BUN SERPL-MCNC: 27 MG/DL (ref 8–28)
CALCIUM SERPL-MCNC: 9.1 MG/DL (ref 8.5–10.5)
CHLORIDE BLD-SCNC: 103 MMOL/L (ref 98–107)
CO2 SERPL-SCNC: 27 MMOL/L (ref 22–31)
CREAT SERPL-MCNC: 0.85 MG/DL (ref 0.6–1.1)
GFR SERPL CREATININE-BSD FRML MDRD: >60 ML/MIN/1.73M2
GLUCOSE BLD-MCNC: 103 MG/DL (ref 70–125)
INR PPP: 1.4 (ref 0.9–1.1)
INR PPP: 1.5 (ref 0.9–1.1)
INR PPP: 1.9 (ref 0.9–1.1)
INR PPP: 1.9 (ref 0.9–1.1)
INR PPP: 2.1 (ref 0.9–1.1)
INR PPP: 2.2 (ref 0.9–1.1)
INR PPP: 2.5 (ref 0.9–1.1)
INR PPP: 2.7 (ref 0.9–1.1)
POC INR - HE - HISTORICAL: 1.4 (ref 0.9–1.1)
POC INR - HE - HISTORICAL: 2.6 (ref 0.9–1.1)
POTASSIUM BLD-SCNC: 4.1 MMOL/L (ref 3.5–5)
PROT SERPL-MCNC: 6.8 G/DL (ref 6–8)
SODIUM SERPL-SCNC: 139 MMOL/L (ref 136–145)
TSH SERPL DL<=0.005 MIU/L-ACNC: 5.75 UIU/ML (ref 0.3–5)

## 2020-01-03 ENCOUNTER — COMMUNICATION - HEALTHEAST (OUTPATIENT)
Dept: ANTICOAGULATION | Facility: CLINIC | Age: 85
End: 2020-01-03

## 2020-01-06 ENCOUNTER — COMMUNICATION - HEALTHEAST (OUTPATIENT)
Dept: ANTICOAGULATION | Facility: CLINIC | Age: 85
End: 2020-01-06

## 2020-01-06 ENCOUNTER — OFFICE VISIT - HEALTHEAST (OUTPATIENT)
Dept: PHYSICAL THERAPY | Facility: REHABILITATION | Age: 85
End: 2020-01-06

## 2020-01-06 ENCOUNTER — AMBULATORY - HEALTHEAST (OUTPATIENT)
Dept: LAB | Facility: CLINIC | Age: 85
End: 2020-01-06

## 2020-01-06 DIAGNOSIS — M47.812 FACET ARTHROPATHY, CERVICAL: ICD-10-CM

## 2020-01-06 DIAGNOSIS — M54.2 NECK PAIN: ICD-10-CM

## 2020-01-06 DIAGNOSIS — M48.061 STENOSIS OF LATERAL RECESS OF LUMBAR SPINE: ICD-10-CM

## 2020-01-06 DIAGNOSIS — M48.061 LUMBAR FORAMINAL STENOSIS: ICD-10-CM

## 2020-01-06 DIAGNOSIS — G89.29 CHRONIC LEFT-SIDED LOW BACK PAIN WITH LEFT-SIDED SCIATICA: ICD-10-CM

## 2020-01-06 DIAGNOSIS — Z86.711 HISTORY OF PULMONARY EMBOLISM: ICD-10-CM

## 2020-01-06 DIAGNOSIS — M79.18 MYOFASCIAL PAIN: ICD-10-CM

## 2020-01-06 DIAGNOSIS — M54.42 CHRONIC LEFT-SIDED LOW BACK PAIN WITH LEFT-SIDED SCIATICA: ICD-10-CM

## 2020-01-06 LAB — INR PPP: 2.9 (ref 0.9–1.1)

## 2020-01-14 ENCOUNTER — AMBULATORY - HEALTHEAST (OUTPATIENT)
Dept: LAB | Facility: CLINIC | Age: 85
End: 2020-01-14

## 2020-01-14 ENCOUNTER — COMMUNICATION - HEALTHEAST (OUTPATIENT)
Dept: ANTICOAGULATION | Facility: CLINIC | Age: 85
End: 2020-01-14

## 2020-01-14 DIAGNOSIS — Z86.711 HISTORY OF PULMONARY EMBOLISM: ICD-10-CM

## 2020-01-14 LAB — INR PPP: 3.3 (ref 0.9–1.1)

## 2020-01-23 ENCOUNTER — AMBULATORY - HEALTHEAST (OUTPATIENT)
Dept: LAB | Facility: CLINIC | Age: 85
End: 2020-01-23

## 2020-01-23 ENCOUNTER — COMMUNICATION - HEALTHEAST (OUTPATIENT)
Dept: ANTICOAGULATION | Facility: CLINIC | Age: 85
End: 2020-01-23

## 2020-01-23 ENCOUNTER — OFFICE VISIT - HEALTHEAST (OUTPATIENT)
Dept: PHYSICAL THERAPY | Facility: REHABILITATION | Age: 85
End: 2020-01-23

## 2020-01-23 DIAGNOSIS — M48.061 STENOSIS OF LATERAL RECESS OF LUMBAR SPINE: ICD-10-CM

## 2020-01-23 DIAGNOSIS — G89.29 CHRONIC LEFT-SIDED LOW BACK PAIN WITH LEFT-SIDED SCIATICA: ICD-10-CM

## 2020-01-23 DIAGNOSIS — R26.81 UNSTEADINESS ON FEET: ICD-10-CM

## 2020-01-23 DIAGNOSIS — M54.2 NECK PAIN: ICD-10-CM

## 2020-01-23 DIAGNOSIS — M47.812 FACET ARTHROPATHY, CERVICAL: ICD-10-CM

## 2020-01-23 DIAGNOSIS — M79.18 MYOFASCIAL PAIN: ICD-10-CM

## 2020-01-23 DIAGNOSIS — M54.42 CHRONIC LEFT-SIDED LOW BACK PAIN WITH LEFT-SIDED SCIATICA: ICD-10-CM

## 2020-01-23 DIAGNOSIS — M48.061 LUMBAR FORAMINAL STENOSIS: ICD-10-CM

## 2020-01-23 DIAGNOSIS — Z86.711 HISTORY OF PULMONARY EMBOLISM: ICD-10-CM

## 2020-01-23 LAB — INR PPP: 1.7 (ref 0.9–1.1)

## 2020-01-27 ENCOUNTER — COMMUNICATION - HEALTHEAST (OUTPATIENT)
Dept: ANTICOAGULATION | Facility: CLINIC | Age: 85
End: 2020-01-27

## 2020-01-27 ENCOUNTER — OFFICE VISIT - HEALTHEAST (OUTPATIENT)
Dept: INTERNAL MEDICINE | Facility: CLINIC | Age: 85
End: 2020-01-27

## 2020-01-27 DIAGNOSIS — I10 ESSENTIAL HYPERTENSION WITH GOAL BLOOD PRESSURE LESS THAN 140/90: ICD-10-CM

## 2020-01-27 DIAGNOSIS — R06.09 DOE (DYSPNEA ON EXERTION): ICD-10-CM

## 2020-01-27 DIAGNOSIS — M89.9 DISORDER OF BONE AND CARTILAGE: ICD-10-CM

## 2020-01-27 DIAGNOSIS — M94.9 DISORDER OF BONE AND CARTILAGE: ICD-10-CM

## 2020-01-27 DIAGNOSIS — M54.16 LEFT LUMBAR RADICULOPATHY: ICD-10-CM

## 2020-01-27 DIAGNOSIS — Z86.711 HISTORY OF PULMONARY EMBOLISM: ICD-10-CM

## 2020-01-27 DIAGNOSIS — I27.82 CHRONIC PULMONARY EMBOLISM WITHOUT ACUTE COR PULMONALE, UNSPECIFIED PULMONARY EMBOLISM TYPE (H): ICD-10-CM

## 2020-01-27 DIAGNOSIS — E78.00 HYPERCHOLESTEREMIA: ICD-10-CM

## 2020-01-27 DIAGNOSIS — I25.83 CORONARY ATHEROSCLEROSIS DUE TO LIPID RICH PLAQUE: ICD-10-CM

## 2020-01-27 DIAGNOSIS — N32.81 OAB (OVERACTIVE BLADDER): ICD-10-CM

## 2020-01-27 LAB
ALBUMIN SERPL-MCNC: 3.6 G/DL (ref 3.5–5)
ALP SERPL-CCNC: 64 U/L (ref 45–120)
ALT SERPL W P-5'-P-CCNC: 15 U/L (ref 0–45)
ANION GAP SERPL CALCULATED.3IONS-SCNC: 11 MMOL/L (ref 5–18)
AST SERPL W P-5'-P-CCNC: 21 U/L (ref 0–40)
BILIRUB DIRECT SERPL-MCNC: 0.2 MG/DL
BILIRUB SERPL-MCNC: 0.5 MG/DL (ref 0–1)
BUN SERPL-MCNC: 21 MG/DL (ref 8–28)
CALCIUM SERPL-MCNC: 9.5 MG/DL (ref 8.5–10.5)
CHLORIDE BLD-SCNC: 103 MMOL/L (ref 98–107)
CHOLEST SERPL-MCNC: 240 MG/DL
CO2 SERPL-SCNC: 27 MMOL/L (ref 22–31)
CREAT SERPL-MCNC: 0.89 MG/DL (ref 0.6–1.1)
ERYTHROCYTE [DISTWIDTH] IN BLOOD BY AUTOMATED COUNT: 12.8 % (ref 11–14.5)
FASTING STATUS PATIENT QL REPORTED: YES
GFR SERPL CREATININE-BSD FRML MDRD: 60 ML/MIN/1.73M2
GLUCOSE BLD-MCNC: 90 MG/DL (ref 70–125)
HCT VFR BLD AUTO: 37.8 % (ref 35–47)
HDLC SERPL-MCNC: 72 MG/DL
HGB BLD-MCNC: 12.6 G/DL (ref 12–16)
INR PPP: 1.6 (ref 0.9–1.1)
LDLC SERPL CALC-MCNC: 145 MG/DL
MCH RBC QN AUTO: 29.2 PG (ref 27–34)
MCHC RBC AUTO-ENTMCNC: 33.4 G/DL (ref 32–36)
MCV RBC AUTO: 87 FL (ref 80–100)
PLATELET # BLD AUTO: 356 THOU/UL (ref 140–440)
PMV BLD AUTO: 7.2 FL (ref 7–10)
POTASSIUM BLD-SCNC: 3.6 MMOL/L (ref 3.5–5)
PROT SERPL-MCNC: 7.2 G/DL (ref 6–8)
RBC # BLD AUTO: 4.33 MILL/UL (ref 3.8–5.4)
SODIUM SERPL-SCNC: 141 MMOL/L (ref 136–145)
TRIGL SERPL-MCNC: 114 MG/DL
TSH SERPL DL<=0.005 MIU/L-ACNC: 4.04 UIU/ML (ref 0.3–5)
WBC: 4.1 THOU/UL (ref 4–11)

## 2020-01-27 ASSESSMENT — MIFFLIN-ST. JEOR: SCORE: 1124.89

## 2020-01-28 ENCOUNTER — COMMUNICATION - HEALTHEAST (OUTPATIENT)
Dept: INTERNAL MEDICINE | Facility: CLINIC | Age: 85
End: 2020-01-28

## 2020-01-28 LAB — 25(OH)D3 SERPL-MCNC: 29.9 NG/ML (ref 30–80)

## 2020-01-30 ENCOUNTER — COMMUNICATION - HEALTHEAST (OUTPATIENT)
Dept: ANTICOAGULATION | Facility: CLINIC | Age: 85
End: 2020-01-30

## 2020-01-30 ENCOUNTER — AMBULATORY - HEALTHEAST (OUTPATIENT)
Dept: LAB | Facility: CLINIC | Age: 85
End: 2020-01-30

## 2020-01-30 ENCOUNTER — OFFICE VISIT - HEALTHEAST (OUTPATIENT)
Dept: PHYSICAL THERAPY | Facility: REHABILITATION | Age: 85
End: 2020-01-30

## 2020-01-30 DIAGNOSIS — M54.42 CHRONIC LEFT-SIDED LOW BACK PAIN WITH LEFT-SIDED SCIATICA: ICD-10-CM

## 2020-01-30 DIAGNOSIS — M48.061 STENOSIS OF LATERAL RECESS OF LUMBAR SPINE: ICD-10-CM

## 2020-01-30 DIAGNOSIS — M48.061 LUMBAR FORAMINAL STENOSIS: ICD-10-CM

## 2020-01-30 DIAGNOSIS — Z86.711 HISTORY OF PULMONARY EMBOLISM: ICD-10-CM

## 2020-01-30 DIAGNOSIS — G89.29 CHRONIC LEFT-SIDED LOW BACK PAIN WITH LEFT-SIDED SCIATICA: ICD-10-CM

## 2020-01-30 DIAGNOSIS — M79.18 MYOFASCIAL PAIN: ICD-10-CM

## 2020-01-30 DIAGNOSIS — M47.812 FACET ARTHROPATHY, CERVICAL: ICD-10-CM

## 2020-01-30 DIAGNOSIS — M54.2 NECK PAIN: ICD-10-CM

## 2020-01-30 LAB — INR PPP: 1.5 (ref 0.9–1.1)

## 2020-02-06 ENCOUNTER — AMBULATORY - HEALTHEAST (OUTPATIENT)
Dept: LAB | Facility: CLINIC | Age: 85
End: 2020-02-06

## 2020-02-06 ENCOUNTER — COMMUNICATION - HEALTHEAST (OUTPATIENT)
Dept: ANTICOAGULATION | Facility: CLINIC | Age: 85
End: 2020-02-06

## 2020-02-06 ENCOUNTER — OFFICE VISIT - HEALTHEAST (OUTPATIENT)
Dept: PHYSICAL THERAPY | Facility: REHABILITATION | Age: 85
End: 2020-02-06

## 2020-02-06 DIAGNOSIS — M48.061 STENOSIS OF LATERAL RECESS OF LUMBAR SPINE: ICD-10-CM

## 2020-02-06 DIAGNOSIS — M54.42 CHRONIC LEFT-SIDED LOW BACK PAIN WITH LEFT-SIDED SCIATICA: ICD-10-CM

## 2020-02-06 DIAGNOSIS — M54.2 NECK PAIN: ICD-10-CM

## 2020-02-06 DIAGNOSIS — G89.29 CHRONIC LEFT-SIDED LOW BACK PAIN WITH LEFT-SIDED SCIATICA: ICD-10-CM

## 2020-02-06 DIAGNOSIS — M48.061 LUMBAR FORAMINAL STENOSIS: ICD-10-CM

## 2020-02-06 DIAGNOSIS — Z86.711 HISTORY OF PULMONARY EMBOLISM: ICD-10-CM

## 2020-02-06 DIAGNOSIS — M79.18 MYOFASCIAL PAIN: ICD-10-CM

## 2020-02-06 DIAGNOSIS — M47.812 FACET ARTHROPATHY, CERVICAL: ICD-10-CM

## 2020-02-06 LAB — INR PPP: 2.2 (ref 0.9–1.1)

## 2020-02-13 ENCOUNTER — AMBULATORY - HEALTHEAST (OUTPATIENT)
Dept: LAB | Facility: CLINIC | Age: 85
End: 2020-02-13

## 2020-02-13 ENCOUNTER — COMMUNICATION - HEALTHEAST (OUTPATIENT)
Dept: ANTICOAGULATION | Facility: CLINIC | Age: 85
End: 2020-02-13

## 2020-02-13 ENCOUNTER — OFFICE VISIT - HEALTHEAST (OUTPATIENT)
Dept: PHYSICAL THERAPY | Facility: REHABILITATION | Age: 85
End: 2020-02-13

## 2020-02-13 DIAGNOSIS — M54.42 CHRONIC LEFT-SIDED LOW BACK PAIN WITH LEFT-SIDED SCIATICA: ICD-10-CM

## 2020-02-13 DIAGNOSIS — M79.18 MYOFASCIAL PAIN: ICD-10-CM

## 2020-02-13 DIAGNOSIS — M48.061 LUMBAR FORAMINAL STENOSIS: ICD-10-CM

## 2020-02-13 DIAGNOSIS — M54.2 NECK PAIN: ICD-10-CM

## 2020-02-13 DIAGNOSIS — M48.061 STENOSIS OF LATERAL RECESS OF LUMBAR SPINE: ICD-10-CM

## 2020-02-13 DIAGNOSIS — M47.812 FACET ARTHROPATHY, CERVICAL: ICD-10-CM

## 2020-02-13 DIAGNOSIS — G89.29 CHRONIC LEFT-SIDED LOW BACK PAIN WITH LEFT-SIDED SCIATICA: ICD-10-CM

## 2020-02-13 DIAGNOSIS — Z86.711 HISTORY OF PULMONARY EMBOLISM: ICD-10-CM

## 2020-02-13 LAB — INR PPP: 2.3 (ref 0.9–1.1)

## 2020-02-19 ENCOUNTER — OFFICE VISIT - HEALTHEAST (OUTPATIENT)
Dept: PHYSICAL THERAPY | Facility: REHABILITATION | Age: 85
End: 2020-02-19

## 2020-02-19 ENCOUNTER — AMBULATORY - HEALTHEAST (OUTPATIENT)
Dept: LAB | Facility: CLINIC | Age: 85
End: 2020-02-19

## 2020-02-19 ENCOUNTER — COMMUNICATION - HEALTHEAST (OUTPATIENT)
Dept: ANTICOAGULATION | Facility: CLINIC | Age: 85
End: 2020-02-19

## 2020-02-19 DIAGNOSIS — M48.061 STENOSIS OF LATERAL RECESS OF LUMBAR SPINE: ICD-10-CM

## 2020-02-19 DIAGNOSIS — M47.812 FACET ARTHROPATHY, CERVICAL: ICD-10-CM

## 2020-02-19 DIAGNOSIS — Z86.711 HISTORY OF PULMONARY EMBOLISM: ICD-10-CM

## 2020-02-19 DIAGNOSIS — G89.29 CHRONIC LEFT-SIDED LOW BACK PAIN WITH LEFT-SIDED SCIATICA: ICD-10-CM

## 2020-02-19 DIAGNOSIS — M48.061 LUMBAR FORAMINAL STENOSIS: ICD-10-CM

## 2020-02-19 DIAGNOSIS — M54.42 CHRONIC LEFT-SIDED LOW BACK PAIN WITH LEFT-SIDED SCIATICA: ICD-10-CM

## 2020-02-19 DIAGNOSIS — M54.2 NECK PAIN: ICD-10-CM

## 2020-02-19 DIAGNOSIS — M79.18 MYOFASCIAL PAIN: ICD-10-CM

## 2020-02-19 LAB — INR PPP: 2.7 (ref 0.9–1.1)

## 2020-02-24 ENCOUNTER — COMMUNICATION - HEALTHEAST (OUTPATIENT)
Dept: PHYSICAL MEDICINE AND REHAB | Facility: CLINIC | Age: 85
End: 2020-02-24

## 2020-02-24 DIAGNOSIS — M54.16 LUMBAR RADICULITIS: ICD-10-CM

## 2020-02-26 ENCOUNTER — COMMUNICATION - HEALTHEAST (OUTPATIENT)
Dept: ANTICOAGULATION | Facility: CLINIC | Age: 85
End: 2020-02-26

## 2020-02-26 ENCOUNTER — AMBULATORY - HEALTHEAST (OUTPATIENT)
Dept: LAB | Facility: CLINIC | Age: 85
End: 2020-02-26

## 2020-02-26 DIAGNOSIS — Z86.711 HISTORY OF PULMONARY EMBOLISM: ICD-10-CM

## 2020-02-26 LAB — INR PPP: 2.8 (ref 0.9–1.1)

## 2020-02-28 ENCOUNTER — HOSPITAL ENCOUNTER (OUTPATIENT)
Dept: PHYSICAL MEDICINE AND REHAB | Facility: CLINIC | Age: 85
Discharge: HOME | End: 2020-02-28
Attending: PHYSICAL MEDICINE & REHABILITATION

## 2020-02-28 ENCOUNTER — COMMUNICATION - HEALTHEAST (OUTPATIENT)
Dept: ANTICOAGULATION | Facility: CLINIC | Age: 85
End: 2020-02-28

## 2020-02-28 DIAGNOSIS — M54.16 LUMBAR RADICULITIS: ICD-10-CM

## 2020-02-28 DIAGNOSIS — Z79.01 ANTICOAGULATED ON WARFARIN: ICD-10-CM

## 2020-02-28 LAB — POC INR - HE - HISTORICAL: 2.5 (ref 0.9–1.1)

## 2020-03-02 ENCOUNTER — COMMUNICATION - HEALTHEAST (OUTPATIENT)
Dept: INTERNAL MEDICINE | Facility: CLINIC | Age: 85
End: 2020-03-02

## 2020-03-02 DIAGNOSIS — I27.82 CHRONIC PULMONARY EMBOLISM WITHOUT ACUTE COR PULMONALE, UNSPECIFIED PULMONARY EMBOLISM TYPE (H): ICD-10-CM

## 2020-03-02 DIAGNOSIS — Z86.718 PERSONAL HISTORY OF DVT (DEEP VEIN THROMBOSIS): ICD-10-CM

## 2020-03-03 ENCOUNTER — COMMUNICATION - HEALTHEAST (OUTPATIENT)
Dept: INTERNAL MEDICINE | Facility: CLINIC | Age: 85
End: 2020-03-03

## 2020-03-03 DIAGNOSIS — I10 ESSENTIAL HYPERTENSION WITH GOAL BLOOD PRESSURE LESS THAN 140/90: ICD-10-CM

## 2020-03-11 ENCOUNTER — COMMUNICATION - HEALTHEAST (OUTPATIENT)
Dept: ANTICOAGULATION | Facility: CLINIC | Age: 85
End: 2020-03-11

## 2020-03-11 ENCOUNTER — AMBULATORY - HEALTHEAST (OUTPATIENT)
Dept: LAB | Facility: CLINIC | Age: 85
End: 2020-03-11

## 2020-03-11 DIAGNOSIS — Z86.711 HISTORY OF PULMONARY EMBOLISM: ICD-10-CM

## 2020-03-11 LAB — INR PPP: 2.3 (ref 0.9–1.1)

## 2020-03-12 ENCOUNTER — HOSPITAL ENCOUNTER (OUTPATIENT)
Dept: PHYSICAL MEDICINE AND REHAB | Facility: CLINIC | Age: 85
Discharge: HOME | End: 2020-03-12
Attending: PHYSICAL MEDICINE & REHABILITATION

## 2020-03-12 DIAGNOSIS — M48.061 LUMBAR FORAMINAL STENOSIS: ICD-10-CM

## 2020-03-12 DIAGNOSIS — M47.812 FACET ARTHROPATHY, CERVICAL: ICD-10-CM

## 2020-03-12 DIAGNOSIS — M54.2 NECK PAIN: ICD-10-CM

## 2020-04-01 ENCOUNTER — COMMUNICATION - HEALTHEAST (OUTPATIENT)
Dept: ANTICOAGULATION | Facility: CLINIC | Age: 85
End: 2020-04-01

## 2021-01-01 ENCOUNTER — ANESTHESIA - HEALTHEAST (OUTPATIENT)
Dept: SURGERY | Facility: CLINIC | Age: 86
End: 2021-01-01

## 2021-01-01 ENCOUNTER — SURGERY - HEALTHEAST (OUTPATIENT)
Dept: SURGERY | Facility: CLINIC | Age: 86
End: 2021-01-01

## 2021-01-01 ENCOUNTER — RECORDS - HEALTHEAST (OUTPATIENT)
Dept: LAB | Facility: CLINIC | Age: 86
End: 2021-01-01

## 2021-01-01 ENCOUNTER — COMMUNICATION - HEALTHEAST (OUTPATIENT)
Dept: ADMINISTRATIVE | Facility: HOSPITAL | Age: 86
End: 2021-01-01

## 2021-01-01 ENCOUNTER — OFFICE VISIT - HEALTHEAST (OUTPATIENT)
Dept: ONCOLOGY | Facility: CLINIC | Age: 86
End: 2021-01-01

## 2021-01-01 ENCOUNTER — COMMUNICATION - HEALTHEAST (OUTPATIENT)
Dept: ONCOLOGY | Facility: HOSPITAL | Age: 86
End: 2021-01-01

## 2021-01-01 ENCOUNTER — DOCUMENTATION ONLY (OUTPATIENT)
Dept: OTHER | Facility: CLINIC | Age: 86
End: 2021-01-01

## 2021-01-01 ENCOUNTER — COMMUNICATION - HEALTHEAST (OUTPATIENT)
Dept: INTERNAL MEDICINE | Facility: CLINIC | Age: 86
End: 2021-01-01

## 2021-01-01 ENCOUNTER — HOME CARE/HOSPICE - HEALTHEAST (OUTPATIENT)
Dept: HOME HEALTH SERVICES | Facility: HOME HEALTH | Age: 86
End: 2021-01-01

## 2021-01-01 ENCOUNTER — COMMUNICATION - HEALTHEAST (OUTPATIENT)
Dept: ANTICOAGULATION | Facility: CLINIC | Age: 86
End: 2021-01-01

## 2021-01-01 ENCOUNTER — RECORDS - HEALTHEAST (OUTPATIENT)
Dept: ADMINISTRATIVE | Facility: OTHER | Age: 86
End: 2021-01-01

## 2021-01-01 ENCOUNTER — COMMUNICATION - HEALTHEAST (OUTPATIENT)
Dept: MEDSURG UNIT | Facility: CLINIC | Age: 86
End: 2021-01-01

## 2021-01-01 ENCOUNTER — AMBULATORY - HEALTHEAST (OUTPATIENT)
Dept: SURGERY | Facility: HOSPITAL | Age: 86
End: 2021-01-01

## 2021-01-01 ENCOUNTER — COMMUNICATION - HEALTHEAST (OUTPATIENT)
Dept: SURGERY | Facility: CLINIC | Age: 86
End: 2021-01-01

## 2021-01-01 ENCOUNTER — HOSPITAL ENCOUNTER (OUTPATIENT)
Dept: PET IMAGING | Facility: HOSPITAL | Age: 86
Discharge: HOME | End: 2021-02-23
Attending: SURGERY

## 2021-01-01 ENCOUNTER — OFFICE VISIT - HEALTHEAST (OUTPATIENT)
Dept: SURGERY | Facility: CLINIC | Age: 86
End: 2021-01-01

## 2021-01-01 ENCOUNTER — COMMUNICATION - HEALTHEAST (OUTPATIENT)
Dept: SCHEDULING | Facility: CLINIC | Age: 86
End: 2021-01-01

## 2021-01-01 ENCOUNTER — SURGERY - HEALTHEAST (OUTPATIENT)
Dept: SURGERY | Facility: HOSPITAL | Age: 86
End: 2021-01-01

## 2021-01-01 ENCOUNTER — ANESTHESIA - HEALTHEAST (OUTPATIENT)
Dept: SURGERY | Facility: HOSPITAL | Age: 86
End: 2021-01-01

## 2021-01-01 ENCOUNTER — AMBULATORY - HEALTHEAST (OUTPATIENT)
Dept: LAB | Facility: CLINIC | Age: 86
End: 2021-01-01

## 2021-01-01 ENCOUNTER — AMBULATORY - HEALTHEAST (OUTPATIENT)
Dept: CARDIOLOGY | Facility: CLINIC | Age: 86
End: 2021-01-01

## 2021-01-01 ENCOUNTER — AMBULATORY - HEALTHEAST (OUTPATIENT)
Dept: ANTICOAGULATION | Facility: CLINIC | Age: 86
End: 2021-01-01

## 2021-01-01 ENCOUNTER — RECORDS - HEALTHEAST (OUTPATIENT)
Dept: RADIOLOGY | Facility: CLINIC | Age: 86
End: 2021-01-01

## 2021-01-01 ENCOUNTER — COMMUNICATION - HEALTHEAST (OUTPATIENT)
Dept: ADMINISTRATIVE | Facility: CLINIC | Age: 86
End: 2021-01-01

## 2021-01-01 ENCOUNTER — AMBULATORY - HEALTHEAST (OUTPATIENT)
Dept: OTHER | Facility: CLINIC | Age: 86
End: 2021-01-01

## 2021-01-01 ENCOUNTER — COMMUNICATION - HEALTHEAST (OUTPATIENT)
Dept: PHARMACY | Facility: CLINIC | Age: 86
End: 2021-01-01

## 2021-01-01 DIAGNOSIS — C18.2 MALIGNANT NEOPLASM OF ASCENDING COLON (H): ICD-10-CM

## 2021-01-01 DIAGNOSIS — C18.3 MALIGNANT NEOPLASM OF HEPATIC FLEXURE (H): ICD-10-CM

## 2021-01-01 DIAGNOSIS — D50.0 IRON DEFICIENCY ANEMIA DUE TO CHRONIC BLOOD LOSS: ICD-10-CM

## 2021-01-01 DIAGNOSIS — I10 ESSENTIAL HYPERTENSION WITH GOAL BLOOD PRESSURE LESS THAN 140/90: ICD-10-CM

## 2021-01-01 DIAGNOSIS — K62.5 BRBPR (BRIGHT RED BLOOD PER RECTUM): ICD-10-CM

## 2021-01-01 DIAGNOSIS — Z11.59 ENCOUNTER FOR SCREENING FOR OTHER VIRAL DISEASES: ICD-10-CM

## 2021-01-01 DIAGNOSIS — C18.8 OVERLAPPING MALIGNANT NEOPLASM OF COLON (H): ICD-10-CM

## 2021-01-01 DIAGNOSIS — Z79.01 LONG TERM CURRENT USE OF ANTICOAGULANT THERAPY: ICD-10-CM

## 2021-01-01 DIAGNOSIS — I27.82 CHRONIC PULMONARY EMBOLISM WITHOUT ACUTE COR PULMONALE, UNSPECIFIED PULMONARY EMBOLISM TYPE (H): ICD-10-CM

## 2021-01-01 LAB
ANION GAP SERPL CALCULATED.3IONS-SCNC: 11 MMOL/L (ref 5–18)
ANION GAP SERPL CALCULATED.3IONS-SCNC: 8 MMOL/L (ref 5–18)
BASOPHILS # BLD AUTO: 0 THOU/UL (ref 0–0.2)
BASOPHILS NFR BLD AUTO: 0 % (ref 0–2)
BUN SERPL-MCNC: 13 MG/DL (ref 8–28)
BUN SERPL-MCNC: 27 MG/DL (ref 8–28)
CALCIUM SERPL-MCNC: 8.6 MG/DL (ref 8.5–10.5)
CALCIUM SERPL-MCNC: 9.3 MG/DL (ref 8.5–10.5)
CEA SERPL-MCNC: 4.2 NG/ML (ref 0–3)
CHLORIDE BLD-SCNC: 101 MMOL/L (ref 98–107)
CHLORIDE BLD-SCNC: 105 MMOL/L (ref 98–107)
CO2 SERPL-SCNC: 25 MMOL/L (ref 22–31)
CO2 SERPL-SCNC: 28 MMOL/L (ref 22–31)
CREAT SERPL-MCNC: 0.83 MG/DL (ref 0.6–1.1)
CREAT SERPL-MCNC: 0.89 MG/DL (ref 0.6–1.1)
EOSINOPHIL # BLD AUTO: 0.2 THOU/UL (ref 0–0.4)
EOSINOPHIL NFR BLD AUTO: 2 % (ref 0–6)
ERYTHROCYTE [DISTWIDTH] IN BLOOD BY AUTOMATED COUNT: 24.1 % (ref 11–14.5)
FERRITIN SERPL-MCNC: 11 NG/ML (ref 10–130)
FERRITIN SERPL-MCNC: 35 NG/ML (ref 10–130)
FERRITIN SERPL-MCNC: 8 NG/ML (ref 10–130)
FOLATE SERPL-MCNC: >20 NG/ML
GFR SERPL CREATININE-BSD FRML MDRD: 60 ML/MIN/1.73M2
GFR SERPL CREATININE-BSD FRML MDRD: >60 ML/MIN/1.73M2
GLUCOSE BLD-MCNC: 105 MG/DL (ref 70–125)
GLUCOSE BLD-MCNC: 124 MG/DL (ref 70–125)
GLUCOSE BLDC GLUCOMTR-MCNC: 98 MG/DL (ref 70–139)
HCT VFR BLD AUTO: 35.3 % (ref 35–47)
HGB BLD-MCNC: 10.7 G/DL (ref 12–16)
IMM GRANULOCYTES # BLD: 0 THOU/UL
IMM GRANULOCYTES NFR BLD: 0 %
INR PPP: 1.6 (ref 0.9–1.1)
INR PPP: 4.86 (ref 0.9–1.1)
IRON SATN MFR SERPL: 26 % (ref 20–50)
IRON SATN MFR SERPL: 3 % (ref 20–50)
IRON SATN MFR SERPL: 3 % (ref 20–50)
IRON SERPL-MCNC: 114 UG/DL (ref 42–175)
IRON SERPL-MCNC: 13 UG/DL (ref 42–175)
IRON SERPL-MCNC: 9 UG/DL (ref 42–175)
LYMPHOCYTES # BLD AUTO: 1.5 THOU/UL (ref 0.8–4.4)
LYMPHOCYTES NFR BLD AUTO: 18 % (ref 20–40)
MCH RBC QN AUTO: 24 PG (ref 27–34)
MCHC RBC AUTO-ENTMCNC: 30.3 G/DL (ref 32–36)
MCV RBC AUTO: 79 FL (ref 80–100)
MONOCYTES # BLD AUTO: 1.1 THOU/UL (ref 0–0.9)
MONOCYTES NFR BLD AUTO: 12 % (ref 2–10)
NEUTROPHILS # BLD AUTO: 5.9 THOU/UL (ref 2–7.7)
NEUTROPHILS NFR BLD AUTO: 68 % (ref 50–70)
OVALOCYTES: ABNORMAL
PLAT MORPH BLD: ABNORMAL
PLATELET # BLD AUTO: 512 THOU/UL (ref 140–440)
PMV BLD AUTO: 9.3 FL (ref 8.5–12.5)
POTASSIUM BLD-SCNC: 3.6 MMOL/L (ref 3.5–5)
POTASSIUM BLD-SCNC: 3.6 MMOL/L (ref 3.5–5)
RBC # BLD AUTO: 4.46 MILL/UL (ref 3.8–5.4)
SARS-COV-2 PCR COMMENT: NORMAL
SARS-COV-2 RNA SPEC QL NAA+PROBE: NEGATIVE
SARS-COV-2 VIRUS SPECIMEN SOURCE: NORMAL
SODIUM SERPL-SCNC: 138 MMOL/L (ref 136–145)
SODIUM SERPL-SCNC: 140 MMOL/L (ref 136–145)
TARGETS BLD QL SMEAR: ABNORMAL
TIBC SERPL-MCNC: 354 UG/DL (ref 313–563)
TIBC SERPL-MCNC: 409 UG/DL (ref 313–563)
TIBC SERPL-MCNC: 445 UG/DL (ref 313–563)
TRANSFERRIN SERPL-MCNC: 283 MG/DL (ref 212–360)
TRANSFERRIN SERPL-MCNC: 327 MG/DL (ref 212–360)
TRANSFERRIN SERPL-MCNC: 356 MG/DL (ref 212–360)
TSH SERPL DL<=0.005 MIU/L-ACNC: 4.31 UIU/ML (ref 0.3–5)
VIT B12 SERPL-MCNC: 629 PG/ML (ref 213–816)
WBC: 8.7 THOU/UL (ref 4–11)

## 2021-01-01 ASSESSMENT — MIFFLIN-ST. JEOR
SCORE: 1090.41
SCORE: 1121.26
SCORE: 1137.59
SCORE: 1088.6
SCORE: 1097.67
SCORE: 1133.96
SCORE: 1052.31
SCORE: 1088.6
SCORE: 1042.78
SCORE: 1090.41
SCORE: 1110.82
SCORE: 1143.03

## 2021-05-26 ENCOUNTER — RECORDS - HEALTHEAST (OUTPATIENT)
Dept: ADMINISTRATIVE | Facility: CLINIC | Age: 86
End: 2021-05-26

## 2021-05-27 VITALS — DIASTOLIC BLOOD PRESSURE: 75 MMHG | HEART RATE: 85 BPM | SYSTOLIC BLOOD PRESSURE: 114 MMHG

## 2021-05-27 VITALS — OXYGEN SATURATION: 96 % | HEART RATE: 100 BPM | TEMPERATURE: 96.7 F

## 2021-05-27 VITALS
TEMPERATURE: 97.6 F | DIASTOLIC BLOOD PRESSURE: 70 MMHG | OXYGEN SATURATION: 98 % | SYSTOLIC BLOOD PRESSURE: 128 MMHG | HEART RATE: 95 BPM

## 2021-05-27 VITALS
HEART RATE: 97 BPM | TEMPERATURE: 96.9 F | RESPIRATION RATE: 18 BRPM | OXYGEN SATURATION: 98 % | SYSTOLIC BLOOD PRESSURE: 116 MMHG | DIASTOLIC BLOOD PRESSURE: 58 MMHG

## 2021-05-27 VITALS
OXYGEN SATURATION: 98 % | DIASTOLIC BLOOD PRESSURE: 67 MMHG | TEMPERATURE: 97.2 F | HEART RATE: 77 BPM | SYSTOLIC BLOOD PRESSURE: 122 MMHG

## 2021-05-27 VITALS — OXYGEN SATURATION: 94 % | SYSTOLIC BLOOD PRESSURE: 118 MMHG | DIASTOLIC BLOOD PRESSURE: 67 MMHG | TEMPERATURE: 96.7 F

## 2021-05-27 VITALS
HEART RATE: 82 BPM | DIASTOLIC BLOOD PRESSURE: 71 MMHG | TEMPERATURE: 97.4 F | SYSTOLIC BLOOD PRESSURE: 120 MMHG | OXYGEN SATURATION: 94 %

## 2021-05-27 VITALS
DIASTOLIC BLOOD PRESSURE: 69 MMHG | SYSTOLIC BLOOD PRESSURE: 91 MMHG | TEMPERATURE: 97.7 F | HEART RATE: 87 BPM | OXYGEN SATURATION: 98 %

## 2021-05-27 VITALS
OXYGEN SATURATION: 99 % | HEART RATE: 78 BPM | RESPIRATION RATE: 18 BRPM | SYSTOLIC BLOOD PRESSURE: 122 MMHG | TEMPERATURE: 97.6 F | DIASTOLIC BLOOD PRESSURE: 78 MMHG

## 2021-05-27 VITALS
SYSTOLIC BLOOD PRESSURE: 117 MMHG | DIASTOLIC BLOOD PRESSURE: 62 MMHG | OXYGEN SATURATION: 96 % | RESPIRATION RATE: 18 BRPM | HEART RATE: 88 BPM

## 2021-05-27 VITALS — HEART RATE: 50 BPM | OXYGEN SATURATION: 97 % | TEMPERATURE: 97 F

## 2021-05-27 VITALS
SYSTOLIC BLOOD PRESSURE: 118 MMHG | OXYGEN SATURATION: 98 % | HEART RATE: 80 BPM | TEMPERATURE: 97.3 F | DIASTOLIC BLOOD PRESSURE: 67 MMHG

## 2021-05-27 VITALS — HEART RATE: 78 BPM | DIASTOLIC BLOOD PRESSURE: 67 MMHG | SYSTOLIC BLOOD PRESSURE: 117 MMHG

## 2021-05-27 NOTE — PROGRESS NOTES
Office Visit - Follow Up   Jaclyn Gibbs   86 y.o. female    Date of Visit: 4/17/2019    Chief Complaint   Patient presents with     Follow-up     fasting      INR Check        Assessment and Plan   1. Weakness of both lower extremities  Complains of chronic weakness of lower extremities due to deconditioning.  Has normal strength of the lower extremities on my exam.  Was also seen by rheumatology Dr. Finkelstein for her lower extremity weakness.  Was advised physical therapy but this was not followed through with an order or referral.  Will refer her now to physical therapy for her leg weakness.  - Ambulatory referral to Adult PT- Internal    2. Imbalance  Also has some imbalance.  Has not fallen.  Uses a cane.  Feels  her balance got worse with her URI symptoms.  Will send her to physical therapy for this also.  - Ambulatory referral to Adult PT- Internal    3. Essential hypertension with goal blood pressure less than 140/90  Has hypertension controlled by her medications.  Continue same losartan, indapamide and amlodipine.  Check basic metabolic panel.  - losartan (COZAAR) 50 MG tablet; Take 1 tablet (50 mg total) by mouth daily.  Dispense: 90 tablet; Refill: 3  - indapamide (LOZOL) 2.5 MG tablet; TAKE 1 TABLET EVERY DAY  Dispense: 90 tablet; Refill: 3  - Basic Metabolic Panel    4. Coronary atherosclerosis due to lipid rich plaque  Has coronary artery disease.  Followed by cardiology.  Saw Dr. Cardona in March.  Had nuclear stress test which was essentially normal for her shortness of breath.  Reports her.  Shortness of breath still comes and goes.  Suspect this is also due to her deconditioning.    5. Hypercholesteremia  Controlled.  Continue atorvastatin.  Last lipids were good, LDL 62,  HDL 67 triglyceride 62 and total cholesterol 141.  Check the following.  - Lipid Cascade  - Thyroid Stimulating Hormone (TSH)  - Hepatic Profile    6. Upper respiratory tract infection, unspecified type  Complains of  5-day cough and colds.  Denies fever.  Feels rundown and tired.  Due to her upper respiratory infection she feels more tired and weak.  Will empirically treat her with Z-Amos.  Check CBC.  - HM2(CBC w/o Differential)  - azithromycin (ZITHROMAX) 250 MG tablet; Take 2 tablets by mouth day one and 1 tablet by mouth days 2-5  Dispense: 6 tablet; Refill: 0    Needs INR for her lifetime warfarin due to recurring pulmonary embolism.  Recheck  INR today.    Reviewed cardiology notes by Dr. Cardona and his recommendations.  Discussed these with the patient.  Reviewed also her nuclear stress test result.  Also discussed this with the patient.    Follow up in 4 weeks.     History of Present Illness   This 86 y.o. old female is here for follow-up.  Complains primarily of lower extremity weakness and imbalance.  Had same problems when she saw the rheumatologist.  Was supposed to get physical therapy but was not ordered by the rheumatologist.  Thought her weakness and balance are due to deconditioning.  Has hypertension well controlled by her medications consisting of amlodipine, indapamide and losartan.  Has hyperlipidemia controlled by atorvastatin.  Has coronary artery disease but stable.  Followed by cardiology.  Saw Dr. Novoa in March and was assessed she was doing well after a nuclear stress test was done due to her shortness of breath.  It turned out to be essentially normal nuclear GXT.  Complains also during this visit of cough and cold for 6 days.  Denies fever and shortness of breath.  But feels more rundown, tired  and weak because of her URI symptoms.  Overall, however, she is stable.    Review of Systems   A 12 point comprehensive review of systems was negative except as noted..     Medications, Allergies and Problem List   Reviewed and updated             Chief Complaint   Follow-up (fasting ) and INR Check       Patient Profile   Social History     Social History Narrative    , 6 children, active, is a  full code.  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     Benign Pigmented Nevus     Seborrheic Keratosis     Basal Cell Carcinoma Of The Skin Of The Face     Lacunar Stroke     Obesity     Osteopenia     Essential hypertension with goal blood pressure less than 140/90     Coronary Artery Disease     Paroxysmal Supraventricular Tachycardia     Palpitations     Hypercholesteremia     Lower Back Pain     Spinal stenosis of lumbar region with radiculopathy, L5 to S1     Weakness of both lower extremities     Back pain     GERD (gastroesophageal reflux disease)     Insomnia, unspecified type     Lower extremity weakness     Scoliosis of thoracolumbar spine     Left lumbar radiculopathy     Other acute pulmonary embolism without acute cor pulmonale (H)     Abnormal nuclear stress test       Past Surgical History  She has a past surgical history that includes Appendectomy;  section (); pr ablate heart dysrhythm focus (2014); Cardiac catheterization; Coronary angioplasty (2010); Cataract extraction (Bilateral); Excision basal cell carcinoma; Lumbar laminectomy (); Total knee arthroplasty (Right, 2003); Total knee arthroplasty (Left, 2008); Coronary Angiogram (N/A, 2018); and Left Heart Catheterization Without Left Ventriculogram (Left, 2018).       Medications and Allergies   Current Outpatient Medications   Medication Sig     amLODIPine (NORVASC) 5 MG tablet TAKE 1 TABLET (5 MG TOTAL) BY MOUTH DAILY.     CALCIUM CARB/MAGNESIUM OXID/D3 (CALCIUM MAGNESIUM + D ORAL) Take by mouth 2 (two) times a day. 750/300/500mg     fluocinonide (LIDEX) 0.05 % cream Apply topically 2 (two) times a day. (Patient taking differently: Apply topically 2 (two) times a day as needed. )     GLUCOSAMINE/CHONDROITIN SULF A (GLUCOSAMINE-CHONDROITIN ORAL) Take 2 tablets by mouth daily.     indapamide (LOZOL) 2.5 MG  "tablet TAKE 1 TABLET EVERY DAY     losartan (COZAAR) 50 MG tablet Take 1 tablet (50 mg total) by mouth daily.     multivitamin (ONE A DAY) per tablet Take 1 tablet by mouth daily.     nitroglycerin (NITROSTAT) 0.4 MG SL tablet Place 1 tablet (0.4 mg total) under the tongue as needed for chest pain (Keep in original bottle).     potassium chloride (K-DUR,KLOR-CON) 10 MEQ tablet Take 1 tablet (10 mEq total) by mouth daily.     warfarin (COUMADIN/JANTOVEN) 5 MG tablet Take 1 or 1 1/2 tabs daily Adjust dose based on INR results as directed..     atorvastatin (LIPITOR) 40 MG tablet Take 1 tablet (40 mg total) by mouth at bedtime.     azithromycin (ZITHROMAX) 250 MG tablet Take 2 tablets by mouth day one and 1 tablet by mouth days 2-5     clopidogrel (PLAVIX) 75 mg tablet Take 1 tablet (75 mg total) by mouth daily.     Allergies   Allergen Reactions     Oxycodone Nausea And Vomiting     Codeine Nausea And Vomiting     Diazepam Nausea Only     Morphine Nausea And Vomiting        Family and Social History   Family History   Problem Relation Age of Onset     Stroke Mother 88     Hypertension Father      Sudden death Father 70        suspected heart attack      Breast cancer Sister      Venous thrombosis Sister 80        At age 80, and had breast CA     Cirrhosis Sister 34     Aneurysm Sister      Other Brother 25        Killed in WWII.     Brain cancer Daughter 5     No Medical Problems Daughter      No Medical Problems Son      No Medical Problems Son      No Medical Problems Son      Dementia Sister         Social History     Tobacco Use     Smoking status: Never Smoker     Smokeless tobacco: Never Used   Substance Use Topics     Alcohol use: No     Drug use: No        Physical Exam       Physical Exam  /78   Pulse 75   Temp 98.3  F (36.8  C)   Ht 5' 3\" (1.6 m)   Wt 163 lb (73.9 kg)   SpO2 96%   BMI 28.87 kg/m    General appearance: alert, appears stated age, cooperative and no distress  Head: Normocephalic, " without obvious abnormality, atraumatic  Ears: normal TM's and external ear canals both ears  Nose: Nares normal. Septum midline. Mucosa normal. No drainage or sinus tenderness.  Throat: lips, mucosa, and tongue normal; teeth and gums normal  Neck: no adenopathy, no carotid bruit, no JVD, supple, symmetrical, trachea midline and thyroid not enlarged, symmetric, no tenderness/mass/nodules  Lungs: clear to auscultation bilaterally  Heart: regular rate and rhythm, S1, S2 normal, no murmur, click, rub or gallop  Abdomen: soft, non-tender; bowel sounds normal; no masses,  no organomegaly  Extremities: extremities normal, atraumatic, no cyanosis or edema  Skin: Skin color, texture, turgor normal. No rashes or lesions  Neurologic: Grossly normal  Meniscus skeletal: Normal exam     Additional Information        Pancho Banks MD  Internal Medicine  Contact me at [phone]     Additional Information   Current Outpatient Medications   Medication Sig     amLODIPine (NORVASC) 5 MG tablet TAKE 1 TABLET (5 MG TOTAL) BY MOUTH DAILY.     CALCIUM CARB/MAGNESIUM OXID/D3 (CALCIUM MAGNESIUM + D ORAL) Take by mouth 2 (two) times a day. 750/300/500mg     fluocinonide (LIDEX) 0.05 % cream Apply topically 2 (two) times a day. (Patient taking differently: Apply topically 2 (two) times a day as needed. )     GLUCOSAMINE/CHONDROITIN SULF A (GLUCOSAMINE-CHONDROITIN ORAL) Take 2 tablets by mouth daily.     indapamide (LOZOL) 2.5 MG tablet TAKE 1 TABLET EVERY DAY     losartan (COZAAR) 50 MG tablet Take 1 tablet (50 mg total) by mouth daily.     multivitamin (ONE A DAY) per tablet Take 1 tablet by mouth daily.     nitroglycerin (NITROSTAT) 0.4 MG SL tablet Place 1 tablet (0.4 mg total) under the tongue as needed for chest pain (Keep in original bottle).     potassium chloride (K-DUR,KLOR-CON) 10 MEQ tablet Take 1 tablet (10 mEq total) by mouth daily.     warfarin (COUMADIN/JANTOVEN) 5 MG tablet Take 1 or 1 1/2 tabs daily Adjust dose based  on INR results as directed..     atorvastatin (LIPITOR) 40 MG tablet Take 1 tablet (40 mg total) by mouth at bedtime.     azithromycin (ZITHROMAX) 250 MG tablet Take 2 tablets by mouth day one and 1 tablet by mouth days 2-5     clopidogrel (PLAVIX) 75 mg tablet Take 1 tablet (75 mg total) by mouth daily.     Allergies   Allergen Reactions     Oxycodone Nausea And Vomiting     Codeine Nausea And Vomiting     Diazepam Nausea Only     Morphine Nausea And Vomiting     Social History     Tobacco Use     Smoking status: Never Smoker     Smokeless tobacco: Never Used   Substance Use Topics     Alcohol use: No     Drug use: No         Time: total time spent with the patient was 40 minutes of which >50% was spent in counseling and coordination of care

## 2021-05-27 NOTE — PROGRESS NOTES
Jaclyn Gibbs who presents today with a chief complaint of  Consult (Arthritis)      Joint Pains:  Yes  Location: neck,  upper/low back  Onset: many years (10 yrs, worse last yr)  Intensity:  4/10  AM Stiffness: few minutes  Alleviating/Aggravating Factors: Tylenol 1-2 tab two times a day.  provides relief  Tolerating Meds: yes  Other: legs are weak      ROS:  Patient denies having any dry eyes, dry mouth, oral ulcers, alopecia, rashes, photosensitivity, history of psoriasis, chest pain, +shortness of breath (see's cardiology age related per pt), no: cough, dysuria, history of kidney stones, abdominal pain, diarrhea, hematochezia, dysphagia, history of peptic ulcer disease, history of HIV, tuberculosis, hepatitis B or C, Lyme disease, seizure history, raynaud's, fevers, recent infections, difficulty sleeping, involuntary weight loss, fatigue, depression, anxiety, loss of appetite, numbness and tingling, +recent bone density, no:  fracture history,+ frontal headaches,  No: double vision, loss of vision or painful vision.      Problem List:  Patient Active Problem List   Diagnosis     DVT left leg 2008 after TKA     Benign Pigmented Nevus     Seborrheic Keratosis     Basal Cell Carcinoma Of The Skin Of The Face     Lacunar Stroke     Obesity     Osteopenia     Essential hypertension with goal blood pressure less than 140/90     Coronary Artery Disease     Paroxysmal Supraventricular Tachycardia     Palpitations     Hypercholesteremia     Lower Back Pain     Spinal stenosis of lumbar region with radiculopathy, L5 to S1     Weakness of both lower extremities     Back pain     GERD (gastroesophageal reflux disease)     Insomnia, unspecified type     Lower extremity weakness     Scoliosis of thoracolumbar spine     Left lumbar radiculopathy     Other acute pulmonary embolism without acute cor pulmonale (H)     Abnormal nuclear stress test        PMH:   Past Medical History:   Diagnosis Date     Acute pancreatitis  2013     Basal cell carcinoma      Carpal tunnel syndrome      Coronary artery disease      CVA (cerebral vascular accident) (H) 2009    Posterior limb right internal capsule ischemic stroke.     GERD (gastroesophageal reflux disease)      Hyperlipidemia      Hypertension      Lumbar spondylosis      Osteoarthritis      Osteopenia 2016     Overactive bladder      Paroxysmal supraventricular tachycardia (H)      Peroneal DVT (deep venous thrombosis), left (H) 2008    after left knee replacement. Was on warfarin for some time.     Pulmonary embolism on right (H) 2017     Scoliosis      Vertigo        Surgical History:  Past Surgical History:   Procedure Laterality Date     APPENDECTOMY       BASAL CELL CARCINOMA EXCISION      upper back and left chin     CARDIAC CATHETERIZATION       CATARACT EXTRACTION Bilateral       SECTION  1970     CORONARY ANGIOPLASTY  2010    stent placed to the first diagonal     CV CORONARY ANGIOGRAM N/A 2018    Procedure: Coronary Angiogram;  Surgeon: Christopher Mendes MD;  Location: [facility];  Service:      CV LEFT HEART CATHETERIZATION WO LEFT VETRICULOGRAM Left 2018    Procedure: Left Heart Catheterization Without Left Ventriculogram;  Surgeon: Christopher Mendes MD;  Location: [facility];  Service:      LUMBAR LAMINECTOMY  2009    L4-L5.     OR ABLATE HEART DYSRHYTHM FOCUS  2014    Description: Catheter Ablation Atrial Supraventricular Tachycardia;  Comments: typical AVNRT,  successful cryoablation     TOTAL KNEE ARTHROPLASTY Right 2003     TOTAL KNEE ARTHROPLASTY Left 2008       Family History:  Family History   Problem Relation Age of Onset     Stroke Mother 88     Hypertension Father      Sudden death Father 70        suspected heart attack      Breast cancer Sister      Venous thrombosis Sister 80        At age 80, and had breast CA     Cirrhosis Sister 34     Aneurysm Sister       Other Brother 25        Killed in WWII.     Brain cancer Daughter 5     No Medical Problems Daughter      No Medical Problems Son      No Medical Problems Son      No Medical Problems Son      Dementia Sister        Social History:   reports that  has never smoked. she has never used smokeless tobacco. She reports that she does not drink alcohol or use drugs.    Allergies:  Allergies   Allergen Reactions     Oxycodone Nausea And Vomiting     Codeine Nausea And Vomiting     Diazepam Nausea Only     Morphine Nausea And Vomiting        Current Medications:  Current Outpatient Medications   Medication Sig Dispense Refill     amLODIPine (NORVASC) 5 MG tablet TAKE 1 TABLET (5 MG TOTAL) BY MOUTH DAILY. 90 tablet 3     atorvastatin (LIPITOR) 40 MG tablet Take 1 tablet (40 mg total) by mouth at bedtime. 90 tablet 5     CALCIUM CARB/MAGNESIUM OXID/D3 (CALCIUM MAGNESIUM + D ORAL) Take by mouth 2 (two) times a day. 750/300/500mg       clopidogrel (PLAVIX) 75 mg tablet Take 1 tablet (75 mg total) by mouth daily. 90 tablet 5     fluocinonide (LIDEX) 0.05 % cream Apply topically 2 (two) times a day. (Patient taking differently: Apply topically 2 (two) times a day as needed. ) 30 g 1     GLUCOSAMINE/CHONDROITIN SULF A (GLUCOSAMINE-CHONDROITIN ORAL) Take 2 tablets by mouth daily.       indapamide (LOZOL) 2.5 MG tablet TAKE 1 TABLET EVERY DAY 90 tablet 3     losartan (COZAAR) 50 MG tablet Take 1 tablet (50 mg total) by mouth daily. 90 tablet 1     multivitamin (ONE A DAY) per tablet Take 1 tablet by mouth daily.       potassium chloride (K-DUR,KLOR-CON) 10 MEQ tablet Take 1 tablet (10 mEq total) by mouth daily. 90 tablet 3     warfarin (COUMADIN/JANTOVEN) 5 MG tablet Take 1 or 1 1/2 tabs daily Adjust dose based on INR results as directed.. 110 tablet 3     lutein 20 mg cap Take 1 capsule by mouth daily.       nitroglycerin (NITROSTAT) 0.4 MG SL tablet Place 1 tablet (0.4 mg total) under the tongue as needed for chest pain (Keep in  original bottle). 45 tablet 6     oxybutynin (DITROPAN) 5 MG tablet Take 1 tablet (5 mg total) by mouth at bedtime. (Patient taking differently: Take 5 mg by mouth at bedtime as needed. ) 90 tablet 3     triamcinolone (KENALOG) 0.1 % cream Apply topically 2 (two) times a day as needed. 30 g 3     No current facility-administered medications for this visit.            Physical Exam:  /62 (Patient Site: Left Arm, Patient Position: Sitting, Cuff Size: Adult Regular)   Pulse 62   General: A & O x 3 in NAD  HEENT: EOMI, Non injected/non icteric sclera, no oral lesions noted  Neck: Supple, no cervical LAD or thyromegaly noted  Derm: No malar rash, psoriatic lesions or nail pitting appreciated  CV: s1s2 with RRR, no rubs appreciated   Lungs: CTA B/L, no wheezing , rales or rhonci appreciated  GI: Soft, NT/ND, no rebound, no guarding noted, no hepatomegally appreciated  MS: Hypertrophic changes noted involving hand joints, nontender.  Otherwise patient demonstrated good passive/active ROM over other joints with no warmth, erythema, tenderness or synovitis noted over these joints.  Back: negative straight leg raising.  Some limitation noted involving C-spine on active range of motion testing with some discomfort particularly on rotation to the left.  Thoracic kyphosis appreciated with some mild discomfort on palpating upper vertebral/paravertebral spine regions.  Muscle tightness appreciated noted involving upper back/mid trapezius regions and paraspinal vertebral regions.   Neuro: 5/5 strength in upper and lower extremities b/l, good sensation b/l,  2+ bicep and patella reflexes b/l      Summary/Assessment:    Pleasant 86-year-old female presents with chronic pains involving neck, upper back and low back.  Particular over the past she has noticed worsening neck pain however denies any radiating component to pains.    Also complains of having lower extremity weakness bilaterally, demonstrated good strength on exam.   Denies having any radiating low back pain however, given MRI (in 2017)  findings of advanced osteoarthritis noted involving lumbar spine with some signs of spinal stenosis, these findings may playing a role.    Takes Tylenol 500-1000 mg on average about once a day, sometimes twice a day and when taken provides good relief.    No clear signs of synovitis noted exam today.    Patient's arthralgias appear to be more so mechanical in nature.    Pertinent rheumatology/past medical history (please refer to above for more detailed history):      Osteoarthritis, multiple joints    Chronic neck pain    Chronic low back pain     Spinal stenosis    Levoscoliosis of lumbar spine    Chronic upper back pain    Sensations of leg weakness    History of bilateral knee replacements    Osteopenia with elevated fracture risk of hip    History of DVT (on Coumadin)    For history of CAD and CVA          Rheumatology medications provided/suggested:    Tylenol      Pertinent medication from other providers or from otc (please refer to above for more detailed med list):    Coumadin  Calcium  Vitamin D      Pertinent medications already tried:           Pertinent lab history:    Normal: Creatinine and liver enzymes      Pertinent imaging/test history:    MRI of lumbar spine from May 2017  CONCLUSION:  1.  Moderate to severe levoscoliosis centered at L2-L3 with a Calderón angle of 36 degrees, mildly progressed from 8/14/2015.  2.  At L5-S1 there is advanced facet arthropathy and severe left neural foraminal narrowing.  3.  At L4-L5 there is advanced facet arthropathy, moderate to severe left lateral recess stenosis, moderate left and mild right neural foraminal narrowing.  4.  At L3-L4 there is moderate central canal stenosis, severe right and moderate left neural foraminal narrowing.  5.  At L2-L3 there is moderate to severe right neural foraminal stenosis.       Other:     for over 60 years, has 6 children and 10  grandchildren.    Plan:      Continue Tylenol 500-1000 mg twice daily as needed.    We will x-ray C-spine.    Will refer to physical therapy.  Patient states that she already tried in-house physical therapy in the past and was not content.  We will therefore refer to [facility].    Takes calcium and vitamin D for osteopenia/osteoporosis.  Will consider repeating bone density test on next visit as last performed in June 2017 and was noted then to have elevated fracture risk involving hip.  Currently not taking any medications for osteoporosis.    Deferred adding a low-dose muscle relaxer to be taken at night.    If neck/back symptoms persist despite PT a consideration is referring to spine clinic and/or further imaging.    Follow-up in 2-3 months.      Procedure note:            Spent 60 minutes with greater than half of this time spent with the patient going over differential diagnosis, prognosis, treatment plan, medication side effects and  answering questions.    Major side effect profile of medications provided/suggested were discussed with the patient.    This note was transcribed using Dragon voice recognition software as a result unintentional grammatical errors or word substitutions may have occurred. Please contact our Rheumatology department if you need any clarification or if you have any related inquiries.    Thank you for referring this patient to our clinic.      Vahe Merida ....................  4/4/2019   11:38 AM

## 2021-05-27 NOTE — TELEPHONE ENCOUNTER
ANTICOAGULATION  MANAGEMENT - BPA Interacting Medication Review    Interacting medication(s): Azithromycin 250mg with warfarin.   - upper respiratory infection x5 days (cough / cold).      Duration: 5 days, 4/17 to 22/19    New medication?:  Yes, interaction per [facility]romed[facility], may increase INR and risk of bleeding.    Plan:    Jaclyn was instructed by prescriber regarding potential interaction.     Warfarin instructions: continue current warfarin dose    Follow up INR recommended in:   4th day of antibiotics.  Scheduled on Mon. 4/22/19 @ [facility]    Anticoagulation calendar updated    Dory Montenegro RN

## 2021-05-27 NOTE — TELEPHONE ENCOUNTER
ANTICOAGULATION  MANAGEMENT    Assessment     Today's INR result of 1.6 is Subtherapeutic (goal INR of 2.0-2.5)        Warfarin taken as previously instructed    Decreased intake and illness may be affecting diet and INR    Potential interaction between azithromycin and warfarin which may affect subsequent INRs    Continues to tolerate warfarin with no reported s/s of bleeding or thromboembolism     Previous INR was Therapeutic    Plan:     Spoke with Jaclyn regarding INR result and instructed:     Warfarin Dosing Instructions:  7.5 mg today then change warfarin dose to 5 mg daily  (7.7 % change)    Instructed patient to follow up no later than: 5 days    Education provided: potential interaction between warfarin and azithromycin and monitoring for clotting signs and symptoms    Jaclyn verbalizes understanding and agrees to warfarin dosing plan.    Instructed to call the [facility] for any changes, questions or concerns. (#[phone])   ?   Lisbeth Lennon, Pharmacy Student     Patient reviewed with pharmacy student and I agree with assessment and plan    Sandie Galaviz, PharmD, preceptor  [facility]        Subjective/Objective:      Jaclyn Gibbs, a 86 y.o. female is on warfarin.     Jaclyn reports:     Home warfarin dose: as updated on anticoagulation calendar per template     Missed doses: No     Medication changes:  Yes: azithromycin starting today x 5 days     S/S of bleeding or thromboembolism:  No     New Injury or illness:  Yes: cold and double vision x 1 in the past week. Had appointment with PCP today. Double vision went away quickly after shutting eye, will discuss with eye doctor at upcoming appointment.     Changes in diet or alcohol consumption:  Yes: ate less this past week due not feeling well     Upcoming surgery, procedure or cardioversion:  No    Anticoagulation Episode Summary     Current INR goal:      TTR:   73.9 % (1.6 y)   Next INR check:   4/11/2019   INR  from last check:   2.40 (3/28/2019)   Most recent INR:    1.60 (4/17/2019)   Goal at result date:   2.0-3.0   Weekly max warfarin dose:      Target end date:   Indefinite   INR check location:      Preferred lab:      Send INR reminders to:   ANTICOAGULATION POOL C (DTN,VAD,CGR,GAV)    Indications    Other acute pulmonary embolism without acute cor pulmonale (H) [I26.99]           Comments:   Goal range 2.0 - 2.5 per Dr. Cardona, 12/12/18         Anticoagulation Care Providers     Provider Role Specialty Phone number    Robin Catherine MD Referring Internal Medicine [phone]

## 2021-05-27 NOTE — TELEPHONE ENCOUNTER
ANTICOAGULATION  MANAGEMENT    Assessment     Today's INR result of 2.40 is Therapeutic (goal INR of 2.0 - 2.5)        Warfarin taken as previously instructed    No new diet changes affecting INR    No new medication/supplements affecting INR    Continues to tolerate warfarin with no reported s/s of bleeding or thromboembolism     Previous INR was Subtherapeutic at 1.60 on 3/7/19.    Plan:     Spoke with Jaclyn regarding INR result and instructed:     Warfarin Dosing Instructions:  (has 5mg tabs)   - Continue current warfarin dose 2.5 mg daily on Mondays; and 5 mg daily rest of week.    Instructed patient to follow up no later than:  2 wks.   - scheduled for 4/17/19 during f/u visit with Dr. Banks.    Education provided: importance of consistent vitamin K intake, target INR goal and significance of current INR result and importance of notifying clinic for changes in medications    Jaclyn verbalizes understanding and agrees to warfarin dosing plan.    Instructed to call the [facility] for any changes, questions or concerns. (#[phone])   ?   Dory Montenegro RN    Subjective/Objective:      Jaclyn Gibbs, a 86 y.o. female is on warfarin.     Jaclyn reports:     Home warfarin dose: as updated on anticoagulation calendar per template     Missed doses: No     Medication changes:  No     S/S of bleeding or thromboembolism:  No     New Injury or illness:  No     Changes in diet or alcohol consumption:  No     Upcoming surgery, procedure or cardioversion:  No    Anticoagulation Episode Summary     Current INR goal:      TTR:   74.8 % (1.6 y)   Next INR check:   4/11/2019   INR from last check:   2.40 (3/28/2019)   Goal at result date:   2.0-3.0   Weekly max warfarin dose:      Target end date:   Indefinite   INR check location:      Preferred lab:      Send INR reminders to:   ANTICOAGULATION POOL C (DTN,VAD,CGR,GAV)    Indications    Other acute pulmonary embolism without acute cor pulmonale (H)  [I26.99]           Comments:   Goal range 2.0 - 2.5 per Dr. Cardona, 12/12/18         Anticoagulation Care Providers     Provider Role Specialty Phone number    Robin Catherine MD Referring Internal Medicine [phone]

## 2021-05-27 NOTE — PATIENT INSTRUCTIONS - HE
Summary of Your Rheumatology Visit    Next Appointment:  2-3 Months    Medications:    Can continue taking Tylenol 500-1000 mg twice a day if necessary for pain relief.    Referrals:    Orthology physical therapy    Tests:        Injections:

## 2021-05-28 NOTE — TELEPHONE ENCOUNTER
ANTICOAGULATION  MANAGEMENT    Assessment     Today's INR result of 2.40 is Therapeutic (goal INR of 2.0 - 2.5)        Warfarin taken as previously instructed    No new diet changes affecting INR    No new medication/supplements affecting INR    Continues to tolerate warfarin with no reported s/s of bleeding or thromboembolism     Previous INR was Subtherapeutic at 1.60 on 4/17/19.    Plan:     Spoke with Jaclyn regarding INR result and instructed:     Warfarin Dosing Instructions:    - Continue current warfarin dose 5 mg daily.    Instructed patient to follow up no later than:  1-2 wks.   - scheduled on 5/20/19 during f/u with Dr. Banks.    Education provided: importance of consistent vitamin K intake, target INR goal and significance of current INR result, importance of notifying clinic for changes in medications and importance of notifying clinic for diarrhea, nausea/vomiting, reduced intake and/or illness    Jaclyn verbalizes understanding and agrees to warfarin dosing plan.    Instructed to call the [facility] for any changes, questions or concerns. (#[phone])   ?   Dory Montenegro RN    Subjective/Objective:      Jaclyn Gibbs, a 86 y.o. female is on warfarin.     Jaclyn reports:     Home warfarin dose: as updated on anticoagulation calendar per template     Missed doses: No     Medication changes:  No     S/S of bleeding or thromboembolism:  No     New Injury or illness:  No     Changes in diet or alcohol consumption:  No     Upcoming surgery, procedure or cardioversion:  No    Anticoagulation Episode Summary     Current INR goal:      TTR:   73.2 % (1.7 y)   Next INR check:   5/21/2019   INR from last check:   2.40 (5/7/2019)   Goal at result date:   2.0-3.0   Weekly max warfarin dose:      Target end date:   Indefinite   INR check location:      Preferred lab:      Send INR reminders to:   ANTICOAGULATION POOL C (DTN,VAD,CGR,GAV)    Indications    Other acute pulmonary embolism without  acute cor pulmonale (H) [I26.99]           Comments:   Goal range 2.0 - 2.5 per Dr. Cardona, 12/12/18         Anticoagulation Care Providers     Provider Role Specialty Phone number    Robin Catherine MD Referring Internal Medicine [phone]

## 2021-05-28 NOTE — TELEPHONE ENCOUNTER
Who is calling:  Patient   Reason for Call:  Patient was returning missed call from [facility] on today's appointment.  At time of patient call, acn was not available.    Date of last appointment with primary care: 4/17/19  Okay to leave a detailed message: No

## 2021-05-28 NOTE — TELEPHONE ENCOUNTER
Called and talked with Jaclyn.     - old message from her  on 5/7/19.     - I already spoke with Jaclyn on 5/7/19.

## 2021-05-28 NOTE — TELEPHONE ENCOUNTER
Patient Returning Call  Reason for call:  Calling [facility] back  Information relayed to patient:  yes  Patient has additional questions:  No  If YES, what are your questions/concerns:  Scheduled for 4/26  Okay to leave a detailed message?: No call back needed

## 2021-05-28 NOTE — TELEPHONE ENCOUNTER
Ok not to take zpak since her URI is resolved. Ask her to keep it for future use. She needs her losartan and indapamide. Cannot stop them. Please ask her to continue all her meds. See me sooner if she is not feeling well.

## 2021-05-28 NOTE — TELEPHONE ENCOUNTER
Left a message to call back regarding lab results.     Please relay to patient results below,     Decreased potassium. Increase potassium you are taking to twice a day.      Decreased hemoglobin, hematocrit and white cell count. Please start iron supplement 3 times a day. Ferrous sulfate, an iron supplement was sent to your pharmacy.      Will repeat your potassium and blood count on your follow up in a month.     Normal rest of your labs specifically your lipids. Thanks.    Donita Ruby LPN

## 2021-05-28 NOTE — TELEPHONE ENCOUNTER
ANTICOAGULATION  MANAGEMENT PROGRAM    Jaclyn Gibbs is overdue for INR check.  Reminder call made.    Spoke with  and left message for Jaclyn. If returning call, please schedule INR check as soon as possible.    - he will relay message to Jaclyn.    Dory Montenegro RN

## 2021-05-28 NOTE — TELEPHONE ENCOUNTER
Patient Returning Call  Reason for call: Returning phone call  Information relayed to patient:  Below message relayed to patient.  Patient has additional questions:  No  If YES, what are your questions/concerns:  No additional questions at this time.  Okay to leave a detailed message?: No call back needed

## 2021-05-29 ENCOUNTER — RECORDS - HEALTHEAST (OUTPATIENT)
Dept: ADMINISTRATIVE | Facility: CLINIC | Age: 86
End: 2021-05-29

## 2021-05-29 NOTE — PROGRESS NOTES
"[facility] Daily Progress     Patient Name: Jaclyn Gibbs  Date: 6/19/2019  Visit #: 3/10  Evaluation date:  6/3/19  Referral Diagnosis: Weakness of both lower extremities  Referring provider: Pancho Banks MD  Visit Diagnosis:     ICD-10-CM    1. Unsteadiness on feet R26.81    2. Bilateral leg weakness R29.898    3. Chronic bilateral low back pain with left-sided sciatica M54.42     G89.29        Precautions / Restrictions : extensive PMH see below       Assessment:     Response to Intervention:  Tolerated manual therapy and new exercises well. She demonstrates a good understanding and performance of her HEP.  She is very motivated and compliant with PT.    Symptoms are consistent with:  Medical diagnosis  Patient is appropriate to continue with skilled physical therapy intervention, as indicated by initial plan of care.    Goal Status:  Pt. will demonstrate/verbalize independence in self-management of condition in : 6 weeks  Pt. will be independent with home exercise program in : 6 weeks  Pt. will show improved balance for safer : ambulation;standing  Pt. will improve posture : and demonstrate posture with minimal to no cuing;in 6 weeks  Patient will stand : 10 minutes;with no pain;for home chores;in 6 weeks;with less difficultty  Patient will look up / down: in 6 weeks (without imbalance)    No data recorded  Other functional progress:           Plan / Patient Education:     Continue with initial plan of care.  Progress with home program as tolerated.  Core strengthening, static and dynamic balance exercises, continue manual therapy if appropriate.    Subjective:     Pain Rating:  Resting 0  Activity:  0    Response to last treatment: \"felt OK\"  HEP- Frequency: 2x/day, Questions or difficulties:  After doing the exercises her legs are very tired.    Patient reports:      She is trying to get into a routine of doing the HEP.    She made a chart to keep track of her HEP " "performance.      Objective:       Mild to moderate myofascial restriction bilateral: quads, hamstrings, gastroc  /61  HR 92   /52  HR 91  PO2: 97%    Treatment Today   Manual Therapy    MFR layers 1-3 bilateral:  quads, hamstrings, gastroc      Exercises:  Exercise #1: sit to stand  Comment #1: 2 x 5, and  10 reps without UE support  Exercise #2: standing hip abduction  Comment #2: 2 x 10 bilateral  Exercise #3: feet together eyes open  Comment #3: 60\" miminal  postural sway  Exercise #4: feet together eyes closed   Comment #4: 30\" x  4 multiple touches each rep,,    fingertips on counter at home for safety  Exercise #5: nustep   Comment #5: 3 minutes with reistance 5 with discussion of HEP, current symptoms and progress toward goals.  Exercise #6: partial tandem stance- heel even with toe  Comment #6: 30\" x 2 bilateral each  Exercise #7: ab set in hooklying, sitting and standing   Comment #7: 2 x 10  Exercise #8: bridging  Comment #8: 2 x 10 cues for foot placement.            TREATMENT MINUTES COMMENTS   Evaluation     Self-care/ Home management     Manual therapy 15 See above.   Neuromuscular Re-education 15    Therapeutic Activity     Therapeutic Exercises 25 See exercise flow-sheet for details.    Gait training     Modality__________________                Total 55    Blank areas are intentional and mean the treatment did not include these items.       Hong Medel, PT  6/19/2019     "

## 2021-05-29 NOTE — TELEPHONE ENCOUNTER
ANTICOAGULATION  MANAGEMENT    Assessment     Today's INR result of 2.30 is Therapeutic (goal INR of 2.0 - 2.5)        Less warfarin taken than instructed which may be affecting INR    No new diet changes affecting INR    No new medication/supplements affecting INR    Continues to tolerate warfarin with no reported s/s of bleeding or thromboembolism     Previous INR was Therapeutic at 2.40 on 5/7/19.    Scheduled for TF-LEO of lumbar spine on 6/4/19 @ [facility].    Plan:     Spoke with Jaclyn regarding INR result and instructed:     Warfarin Dosing Instructions:  (has 5mg tabs)   - Continue current warfarin dose with 2.5mg every Mondays and 5 mg daily all other days.    Instructed patient to follow up no later than:  Advised to recheck INR 7-10 days after LEO injection.    Education provided: importance of consistent vitamin K intake, target INR goal and significance of current INR result, importance of taking warfarin as instructed, importance of notifying clinic for changes in medications and importance of notifying clinic of upcoming surgeries and procedures 2 weeks in advance    Jaclyn verbalizes understanding and agrees to warfarin dosing plan.    Instructed to call the [facility] for any changes, questions or concerns. (#[phone])   ?   Dory Montenegro RN    Subjective/Objective:      Jaclyn Gibbs, a 86 y.o. female is on warfarin.     Jaclyn reports:     Home warfarin dose: template incorrect; verbally confirmed home dose with Jaclyn and updated on anticoagulation calendar     Missed doses: No     Medication changes:  Yes:  As far as she can remember in the last 2 wks, she has been taking 2.5mg warfarin dose every Mondays and 5mg ROW.  This is less than instructed.     S/S of bleeding or thromboembolism:  No     New Injury or illness:  Yes:  Reported flu / cold since April 30 and back pain from sciatic nerve / hard to ambulate.     Changes in diet or alcohol consumption:  No     Upcoming  surgery, procedure or cardioversion:  Yes:  Will be scheduled for (lumbar spine injection) LEFT L5-S1 TF-LEO.  (does NOT need to hold warfarin doses.).    Anticoagulation Episode Summary     Current INR goal:      TTR:   73.8 % (1.7 y)   Next INR check:   6/20/2019   INR from last check:   2.30 (5/23/2019)   Goal at result date:   2.0-3.0   Weekly max warfarin dose:      Target end date:   Indefinite   INR check location:      Preferred lab:      Send INR reminders to:   [facility]    Indications    Other acute pulmonary embolism without acute cor pulmonale (H) [I26.99]           Comments:   Goal range 2.0 - 2.5 per Dr. Cardona, 12/12/18         Anticoagulation Care Providers     Provider Role Specialty Phone number    Robin Catherine MD Referring Internal Medicine [phone]

## 2021-05-29 NOTE — TELEPHONE ENCOUNTER
ANTICOAGULATION  MANAGEMENT    Assessment     Today's INR result of 1.90 is Subtherapeutic (goal INR of 2.0 - 2.5)        Warfarin taken as previously instructed    No new diet changes affecting INR    Potential interaction between Methylprednisolone injection  and warfarin which may affect subsequent INRs    Continues to tolerate warfarin with no reported s/s of bleeding or thromboembolism     Previous INR was Subtherapeutic at 1.90 on 5/23/19.    Plan:     Spoke with Jaclyn regarding INR result and instructed:     Warfarin Dosing Instructions:   (has 5mg tabs)   - today, advised one time booster with 7.5mg warfarin dose.   - then continue current warfarin dose 5 mg daily.    Instructed patient to follow up no later than:  1-2 wks.   - scheduled on 6/19/19 during PT @ [facility]    Education provided: importance of consistent vitamin K intake, target INR goal and significance of current INR result, potential interaction between warfarin and Methylprednisolone injection, importance of notifying clinic for changes in medications and importance of notifying clinic of upcoming surgeries and procedures 2 weeks in advance    Jaclyn verbalizes understanding and agrees to warfarin dosing plan.    Instructed to call the [facility] for any changes, questions or concerns. (#[phone])   ?   Dory Montenegro RN    Subjective/Objective:      Jaclyn Gibbs, a 86 y.o. female is on warfarin.     Jaclyn reports:     Home warfarin dose: verbally confirmed home dose with Jaclyn and updated on anticoagulation calendar     Missed doses: No     Medication changes:  Yes:  TF-LEO of lumbar spine with Methylprednisone injection on 6/4/19.     S/S of bleeding or thromboembolism:  No     New Injury or illness:  Yes:  Lumbar radiculitis.     Changes in diet or alcohol consumption:  No     Upcoming surgery, procedure or cardioversion:  No    Anticoagulation Episode Summary     Current INR goal:      TTR:   73.9 % (1.8 y)   Next INR  check:   6/18/2019   INR from last check:   1.90! (6/4/2019)   Goal at result date:   2.0-3.0   Weekly max warfarin dose:      Target end date:   Indefinite   INR check location:      Preferred lab:      Send INR reminders to:   [facility]    Indications    Other acute pulmonary embolism without acute cor pulmonale (H) [I26.99]           Comments:   Goal range 2.0 - 2.5 per Dr. Cardona, 12/12/18         Anticoagulation Care Providers     Provider Role Specialty Phone number    Robin Catherine MD Referring Internal Medicine [phone]

## 2021-05-29 NOTE — TELEPHONE ENCOUNTER
"Received call from patient inquiring about repeat injection. Pt had last injection L5-S1 TFESI with Dr. Torres on 3/07/2019. She states \"I usually try and wait 3 months between injections, which would put me at June 7th. However, my pain came back on Sunday with a vengeance\". Pt reports she received 100% relief from her last injection. Informed her will double check with provider to make sure ok to do repeat injection. Injection requirements reviewed.   "

## 2021-05-29 NOTE — PROGRESS NOTES
Office Visit - Follow Up   Jaclyn Gibbs   86 y.o. female    Date of Visit: 5/23/2019    Chief Complaint   Patient presents with     Follow-up     fasting      INR Check        Assessment and Plan   1. 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  Has not had recurrence since she was placed on warfarin.  Now on a lifetime warfarin.  Due for her INR.  - INR    2. Personal history of DVT (deep vein thrombosis)  Had left leg DVT which propagated to to cause pulmonary embolism.  Has not had recurrence since  she was placed on warfarin.  Now on a lifetime warfarin.    3. Essential hypertension with goal blood pressure less than 140/90  Controlled.  Continue same medications, amlodipine, indapamide and losartan.    4. Generalized muscle weakness  Weak and wobbly at times.  Due to her advanced age aggravated by her lack of sleep due to overactive bladder.  Reports she void several times at night which disturbs her sleep.  Was tried on oxybutynin but causes dry mouth.    5. OAB (overactive bladder)  Has overactive bladder causing her to void every 2 hours at night and it disturbs her sleep.  Only sleeps 4 to 6 hours but  at the very fragmented sleep.  As such she feels tired and weak the next day.  Discussed need to see urology to help her frequent nightly voiding.  Agrees to see urology.  Will refer to urology.  - Ambulatory referral to Urology          Follow up in 4 months.  Patient     History of Present Illness   This 86 y.o. old female is here for follow-up.  Still feel wobbly and weak.  Supposed to get physical therapy but she canceled it because she  was sick with URI.  Also reports she does not sleep well at night because she wakes up every 8 hours to void.  She has overactive bladder.  Was tried on oxybutynin but she did not continue taking it since it causes dry mouth.  Has hypertension well controlled now by combination of amlodipine and  indapamide and losartan.  Has hyperlipidemia well controlled by simvastatin.  Last lipids were good.  Has recurring history of DVT and pulmonary embolism for which she now takes lifetime warfarin.  Due for INR.    Review of Systems   A 12 point comprehensive review of systems was negative except as noted..     Medications, Allergies and Problem List   Reviewed and updated             Chief Complaint   Follow-up (fasting ) and INR Check       Patient Profile   Social History     Social History Narrative    , 6 children, active, is a full code.  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     Benign Pigmented Nevus     Seborrheic Keratosis     Basal Cell Carcinoma Of The Skin Of The Face     Lacunar Stroke     Obesity     Osteopenia     Essential hypertension with goal blood pressure less than 140/90     Coronary Artery Disease     Paroxysmal Supraventricular Tachycardia     Palpitations     Hypercholesteremia     Lower Back Pain     Spinal stenosis of lumbar region with radiculopathy, L5 to S1     Weakness of both lower extremities     Back pain     GERD (gastroesophageal reflux disease)     Insomnia, unspecified type     Lower extremity weakness     Scoliosis of thoracolumbar spine     Left lumbar radiculopathy     Other acute pulmonary embolism without acute cor pulmonale (H)     Abnormal nuclear stress test     History of pulmonary embolism     Personal history of DVT (deep vein thrombosis)     OAB (overactive bladder)       Past Surgical History  She has a past surgical history that includes Appendectomy;  section (); pr ablate heart dysrhythm focus (2014); Cardiac catheterization; Coronary angioplasty (2010); Cataract extraction (Bilateral); Excision basal cell carcinoma; Lumbar laminectomy (); Total knee arthroplasty (Right, 2003); Total knee arthroplasty (Left, 2008); Coronary  Angiogram (N/A, 8/23/2018); and Left Heart Catheterization Without Left Ventriculogram (Left, 8/23/2018).       Medications and Allergies   Current Outpatient Medications   Medication Sig     amLODIPine (NORVASC) 5 MG tablet TAKE 1 TABLET (5 MG TOTAL) BY MOUTH DAILY.     CALCIUM CARB/MAGNESIUM OXID/D3 (CALCIUM MAGNESIUM + D ORAL) Take by mouth 2 (two) times a day. 750/300/500mg     fluocinonide (LIDEX) 0.05 % cream Apply topically 2 (two) times a day. (Patient taking differently: Apply topically 2 (two) times a day as needed. )     GLUCOSAMINE/CHONDROITIN SULF A (GLUCOSAMINE-CHONDROITIN ORAL) Take 2 tablets by mouth daily.     indapamide (LOZOL) 2.5 MG tablet TAKE 1 TABLET EVERY DAY     losartan (COZAAR) 50 MG tablet Take 1 tablet (50 mg total) by mouth daily.     multivitamin (ONE A DAY) per tablet Take 1 tablet by mouth daily.     nitroglycerin (NITROSTAT) 0.4 MG SL tablet Place 1 tablet (0.4 mg total) under the tongue as needed for chest pain (Keep in original bottle).     potassium chloride (K-DUR,KLOR-CON) 10 MEQ tablet Take 1 tablet (10 mEq total) by mouth daily.     warfarin (COUMADIN/JANTOVEN) 5 MG tablet Take 1 or 1 1/2 tabs daily Adjust dose based on INR results as directed..     atorvastatin (LIPITOR) 40 MG tablet Take 1 tablet (40 mg total) by mouth at bedtime.     clopidogrel (PLAVIX) 75 mg tablet Take 1 tablet (75 mg total) by mouth daily.     Allergies   Allergen Reactions     Oxycodone Nausea And Vomiting     Codeine Nausea And Vomiting     Diazepam Nausea Only     Morphine Nausea And Vomiting        Family and Social History   Family History   Problem Relation Age of Onset     Stroke Mother 88     Hypertension Father      Sudden death Father 70        suspected heart attack      Breast cancer Sister      Venous thrombosis Sister 80        At age 80, and had breast CA     Cirrhosis Sister 34     Aneurysm Sister      Other Brother 25        Killed in WWII.     Brain cancer Daughter 5     No Medical  "Problems Daughter      No Medical Problems Son      No Medical Problems Son      No Medical Problems Son      Dementia Sister         Social History     Tobacco Use     Smoking status: Never Smoker     Smokeless tobacco: Never Used   Substance Use Topics     Alcohol use: No     Drug use: No        Physical Exam       Physical Exam  /82   Pulse 81   Ht 5' 3\" (1.6 m)   Wt 164 lb (74.4 kg)   SpO2 97%   BMI 29.05 kg/m    General appearance: alert, appears stated age, cooperative and no distress  Head: Normocephalic, without obvious abnormality, atraumatic  Throat: lips, mucosa, and tongue normal; teeth and gums normal  Neck: no adenopathy, no carotid bruit, no JVD, supple, symmetrical, trachea midline and thyroid not enlarged, symmetric, no tenderness/mass/nodules  Lungs: clear to auscultation bilaterally  Heart: regular rate and rhythm, S1, S2 normal, no murmur, click, rub or gallop  Abdomen: soft, non-tender; bowel sounds normal; no masses,  no organomegaly  Extremities: extremities normal, atraumatic, no cyanosis or edema  Skin: Skin color, texture, turgor normal. No rashes or lesions     Additional Information        Pancho Banks MD  Internal Medicine  Contact me at [phone]     Additional Information   Current Outpatient Medications   Medication Sig     amLODIPine (NORVASC) 5 MG tablet TAKE 1 TABLET (5 MG TOTAL) BY MOUTH DAILY.     CALCIUM CARB/MAGNESIUM OXID/D3 (CALCIUM MAGNESIUM + D ORAL) Take by mouth 2 (two) times a day. 750/300/500mg     fluocinonide (LIDEX) 0.05 % cream Apply topically 2 (two) times a day. (Patient taking differently: Apply topically 2 (two) times a day as needed. )     GLUCOSAMINE/CHONDROITIN SULF A (GLUCOSAMINE-CHONDROITIN ORAL) Take 2 tablets by mouth daily.     indapamide (LOZOL) 2.5 MG tablet TAKE 1 TABLET EVERY DAY     losartan (COZAAR) 50 MG tablet Take 1 tablet (50 mg total) by mouth daily.     multivitamin (ONE A DAY) per tablet Take 1 tablet by mouth daily.     " nitroglycerin (NITROSTAT) 0.4 MG SL tablet Place 1 tablet (0.4 mg total) under the tongue as needed for chest pain (Keep in original bottle).     potassium chloride (K-DUR,KLOR-CON) 10 MEQ tablet Take 1 tablet (10 mEq total) by mouth daily.     warfarin (COUMADIN/JANTOVEN) 5 MG tablet Take 1 or 1 1/2 tabs daily Adjust dose based on INR results as directed..     atorvastatin (LIPITOR) 40 MG tablet Take 1 tablet (40 mg total) by mouth at bedtime.     clopidogrel (PLAVIX) 75 mg tablet Take 1 tablet (75 mg total) by mouth daily.     Allergies   Allergen Reactions     Oxycodone Nausea And Vomiting     Codeine Nausea And Vomiting     Diazepam Nausea Only     Morphine Nausea And Vomiting     Social History     Tobacco Use     Smoking status: Never Smoker     Smokeless tobacco: Never Used   Substance Use Topics     Alcohol use: No     Drug use: No         Time: total time spent with the patient was 25 minutes of which >50% was spent in counseling and coordination of care

## 2021-05-29 NOTE — PROGRESS NOTES
[facility]   Balance Initial Evaluation    Patient Name: Jaclyn Gibbs  Date of evaluation: 6/3/2019  Referral Diagnosis: Weakness of both lower extremities  Referring provider: Pancho Banks MD  Visit Diagnosis:     ICD-10-CM    1. Unsteadiness on feet R26.81    2. Bilateral leg weakness R29.898        Precautions / Restrictions : extensive PMH see below       Assessment:      Impairments in  pain, posture, ROM, joint mobility, strength, gait/locomotion and balance  Patient's signs and symptoms are consistent with gait instability complicated by LE weakness and lumbar spine pain with radicular symptoms..  Patient responded well to HEP.  Prognosis to achieve goals is  good   Pt. is appropriate for skilled PT intervention as outlined in the Plan of Care (POC).  Based on falls assessment, patient is at an increased risk for falling. Plan of care will address this risk with lower extremity strengthening and balance training.    Goals:  Pt. will demonstrate/verbalize independence in self-management of condition in : 6 weeks  Pt. will be independent with home exercise program in : 6 weeks  Pt. will show improved balance for safer : ambulation;standing  Pt. will improve posture : and demonstrate posture with minimal to no cuing;in 6 weeks  Patient will stand : 10 minutes;with no pain;for home chores;in 6 weeks;with less difficultty  Patient will look up / down: in 6 weeks (without imbalance)    No data recorded    Patient's expectations/goals are realistic.    Barriers to Learning or Achieving Goals:  No Barriers.       Plan / Patient Instructions:        Plan of Care:   Communication with: Referral Source  Patient Related Instruction: Nature of Condition;Basis of treatment;Expected outcome;Body mechanics;Treatment plan and rationale;Posture;Self Care instruction;Precautions;Next steps  Times per Week: 2-1  Number of Weeks: 6  Number of Visits: 10  Discharge Planning: to include HEP  Precautions  / Restrictions : extensive PMH see below  Therapeutic Exercise: ROM;Stretching;Strengthening  Neuromuscular Reeducation: posture;balance/proprioception;core  Manual Therapy: soft tissue mobilization;myofascial release;joint mobilization  Gait Training: as indicated      Pt. is in agreement with the Plan of Care  A Home Exercise Program (HEP) was initiated today.  Pt. was instructed in exercises by PT and patient was given a handout with detailed instructions.    Plan for next visit: review HEP progress as tolerated.     Subjective:         Social information:   Living Situation:condo and no stairs, on the first floor.   Occupation: retired   Work Status:NA   Equipment Available: She has a cane (SEC), but rarely uses it.    History of Present Illness:    Jaclyn is a 86 y.o. female who presents to therapy today with complaints of feeling of leg weakness and imbalance. Date of onset:  3 years ago. PT ordered:2019  Onset was gradual. Symptoms are not improving. She denies history of similar symptoms. She describes their previous level of function as not limited    Pain Ratin  Pain rating at best: 0  Pain rating at worst: 8  Pain description: aching, pain and weakness    Functional limitations are described as occurring with:   ascending and descending stairs or curbs  balance  standing    transitional movements sit to stand and sit to supine  walking           Objective:      Note: Items left blank indicates the item was not performed or not indicated at the time of the evaluation.    Patient Outcome Measures :    Lower Extremity Functional Scale (_/80): 32     Scores range from 0-80, where a score of 80 represents maximum function. The minimal clinically important difference is a positive change of 9 points.    Balance Examination  1. Unsteadiness on feet     2. Bilateral leg weakness       Precautions / Restrictions : extensive PMH see below     Involved side: Bilateral  Posture Observation:      Cervical:   "Moderate forward head  Shoulder/Thoracic complex: Moderate bilateral scapular protraction   Lower extremity:  Knee:  Bilateral genu valgus.  Assistive Device:  patient has SEC but rarely uses it  Gait Observation:  Slow, lateral sway, unsteady    LE Strength: bilateral hip flexion 4/5, extension 4/5 abduction 4/5, knee extension 5/5 flexion 4/5, ankle dorsiflexion 4/5    LE ROM: knee extension and hip extension deficit noted mild.    Balance Assessment:    Rhomberg - Eyes Open:  30 seconds  Rhomberg - Eyes Closed:  15 seconds  Sharpened Rhomberg - Eyes Open:  R in front 0 seconds; L in front 0 seconds  APTA sit < > stand:  7 in 30 seconds  Single Leg Stance:  1 seconds      Treatment Today     Exercises:  Exercise #1: sit to stand  Comment #1: 10  Exercise #2: standing hip abduction  Comment #2: x 10 bilateral  Exercise #3: feet together eyes open  Comment #3: 30\" x 2   Exercise #4: feet together eyes closed fingertips on counter  Comment #4: 30\" x 2     TREATMENT MINUTES COMMENTS   Evaluation 25    Self-care/ Home management     Manual therapy     Neuromuscular Re-education 7    Therapeutic Activity     Therapeutic Exercises 8    Gait training     Modality__________________                Total 40 Patient late.   Blank areas are intentional and mean the treatment did not include these items.       PT Evaluation Code: (Please list factors)  Patient History/Comorbidities:   Patient Active Problem List   Diagnosis     DVT left leg 2008 after TKA     Benign Pigmented Nevus     Seborrheic Keratosis     Basal Cell Carcinoma Of The Skin Of The Face     Lacunar Stroke     Obesity     Osteopenia     Essential hypertension with goal blood pressure less than 140/90     Coronary Artery Disease     Paroxysmal Supraventricular Tachycardia     Palpitations     Hypercholesteremia     Lower Back Pain     Spinal stenosis of lumbar region with radiculopathy, L5 to S1     Weakness of both lower extremities     Back pain     GERD " (gastroesophageal reflux disease)     Insomnia, unspecified type     Lower extremity weakness     Scoliosis of thoracolumbar spine     Left lumbar radiculopathy     Other acute pulmonary embolism without acute cor pulmonale (H)     Abnormal nuclear stress test     History of pulmonary embolism     Personal history of DVT (deep vein thrombosis)     OAB (overactive bladder)      Past Medical History:   Diagnosis Date     Acute pancreatitis 04/2013     Basal cell carcinoma      Carpal tunnel syndrome      Coronary artery disease      CVA (cerebral vascular accident) (H) 12/11/2009    Posterior limb right internal capsule ischemic stroke.     GERD (gastroesophageal reflux disease)      Hyperlipidemia      Hypertension      Lumbar spondylosis      Osteoarthritis      Osteopenia 02/12/2016     Overactive bladder      Paroxysmal supraventricular tachycardia (H)      Peroneal DVT (deep venous thrombosis), left (H) 11/19/2008    after left knee replacement. Was on warfarin for some time.     Pulmonary embolism on right (H) 08/09/2017     Scoliosis      Vertigo       Examination: as above  Clinical Presentation: as above  Clinical Decision Making: low complexity.    Patient History/  Comorbidities Examination  (body structures and functions, activity limitations, and/or participation restrictions) Clinical Presentation Clinical Decision Making (Complexity)   No documented Comorbidities or personal factors 1-2 Elements Stable and/or uncomplicated Low   1-2 documented comorbidities or personal factor 3 Elements Evolving clinical presentation with changing characteristics Moderate   3-4 documented comorbidities or personal factors 4 or more Unstable and unpredictable High             Hong Medel, PT  6/3/2019  10:49 AM

## 2021-05-29 NOTE — TELEPHONE ENCOUNTER
Gini Knapp for prescription refill requested below?  Refill has been set up for you to review.  Please advise.  Thank you.  Mirian SANTOS CMA/DONNIE....................7:30 AM

## 2021-05-29 NOTE — TELEPHONE ENCOUNTER
Please advise on multiple concerns and then refills will be set up. Thank you.    Terrie Rosales, CMA

## 2021-05-29 NOTE — PROGRESS NOTES
"[facility] Daily Progress     Patient Name: Jaclyn Gibbs  Date: 6/12/2019  Visit #: 2/10  Evaluation date:  6/3/19  Referral Diagnosis: Weakness of both lower extremities  Referring provider: Pancho Banks MD  Visit Diagnosis:     ICD-10-CM    1. Unsteadiness on feet R26.81    2. Bilateral leg weakness R29.898    3. Chronic bilateral low back pain with left-sided sciatica M54.42     G89.29        Precautions / Restrictions : extensive PMH see below       Assessment:     Response to Intervention:  Tolerated manual therapy and new exercises well. She demonstrates a good understanding and performance of her HEP.  She is very motivated and compliant with PT.    Symptoms are consistent with:  Medical diagnosis  Patient is appropriate to continue with skilled physical therapy intervention, as indicated by initial plan of care.    Goal Status:  Pt. will demonstrate/verbalize independence in self-management of condition in : 6 weeks  Pt. will be independent with home exercise program in : 6 weeks  Pt. will show improved balance for safer : ambulation;standing  Pt. will improve posture : and demonstrate posture with minimal to no cuing;in 6 weeks  Patient will stand : 10 minutes;with no pain;for home chores;in 6 weeks;with less difficultty  Patient will look up / down: in 6 weeks (without imbalance)    No data recorded  Other functional progress:           Plan / Patient Education:     Continue with initial plan of care.  Progress with home program as tolerated.  Core strengthening, static and dynamic balance exercises, continue manual therapy if appropriate.    Subjective:     Pain Rating:  Resting 0  Activity:  0    Response to last treatment: \"felt OK\"  HEP- Frequency: 2x/day, Questions or difficulties:  After doing the exercises her legs are very tired.    Patient reports:      She is trying to get into a routine of doing the HEP.    She made a chart to keep track of her HEP " "performance.      Objective:       Mild to moderate myofascial restriction bilateral: quads, hamstrings, gastroc      Treatment Today   Manual Therapy    MFR layers 1-3 bilateral:  quads, hamstrings, gastroc      Exercises:  Exercise #1: sit to stand  Comment #1: 10  Exercise #2: standing hip abduction  Comment #2: x 10 bilateral  Exercise #3: feet together eyes open  Comment #3: 30\" x 2   Exercise #4: feet together eyes closed fingertips on counter  Comment #4: 30\" x 2            TREATMENT MINUTES COMMENTS   Evaluation     Self-care/ Home management     Manual therapy 20 See above.   Neuromuscular Re-education 10    Therapeutic Activity     Therapeutic Exercises 25 See exercise flow-sheet for details.    Gait training     Modality__________________                Total 55    Blank areas are intentional and mean the treatment did not include these items.       Hong Medel, PT  6/12/2019     "

## 2021-05-29 NOTE — TELEPHONE ENCOUNTER
Dr. Darren Anaya has narrow INR target goal of 2.0 - 2.5  Do you agree with warfarin dose and orders?

## 2021-05-30 ENCOUNTER — RECORDS - HEALTHEAST (OUTPATIENT)
Dept: ADMINISTRATIVE | Facility: CLINIC | Age: 86
End: 2021-05-30

## 2021-05-30 VITALS — WEIGHT: 159 LBS | BODY MASS INDEX: 27.14 KG/M2 | HEIGHT: 64 IN

## 2021-05-30 VITALS — BODY MASS INDEX: 28.6 KG/M2 | WEIGHT: 161.4 LBS | HEIGHT: 63 IN

## 2021-05-30 NOTE — TELEPHONE ENCOUNTER
Yes. Please tell Jaclyn it is expected from iron supplement to have the stools to turn black. No worries on this.

## 2021-05-30 NOTE — TELEPHONE ENCOUNTER
Inform Jaclyn she already had MRI of her neck in 2016 showing multilevel degenerative disc disease with canal narrowing. Hence this is causing her neck pains. Ok to repeat her cervical spine MRI if she has more neck pains.

## 2021-05-30 NOTE — TELEPHONE ENCOUNTER
"Anticoagulation Annual Referral Renewal Review    Jaclyn Gibbs's chart reviewed for annual renewal of referral to anticoagulation monitoring.        Criteria for anticoagulation nurse and/or pharmacist renewal met   Warfarin indication: PE Yes , DVT/PE with previous provider documentation patient to be on extended anticoagulation   Current with INR monitoring/compliant Yes Yes   Date of last office visit 07/24/2019 Yes, had office visit within last year   Time in Therapeutic Range (TTR) 9.46 % No, TTR < 60 %       Jaclyn Gibbs did NOT meet all criteria for anticoagulation management program initiated renewal and requires provider review. Using dot phrase, \".acmrenewalprovider\", please advise if Jaclyn's anticoagulation management referral should be renewed or if patient should be seen in office to review anticoagulation therapy      Dory Montenegro RN  2:28 PM      "

## 2021-05-30 NOTE — TELEPHONE ENCOUNTER
ANTICOAGULATION  MANAGEMENT    Assessment     Today's INR result of 3.80 is Supratherapeutic (goal INR of 2.0 - 2.5)        Warfarin taken as previously instructed    No new diet changes affecting INR    No interaction expected between Ferrous Sulfate and warfarin    Continues to tolerate warfarin with no reported s/s of bleeding or thromboembolism     Previous INR was Subtherapeutic at 1.80 on 7/22/19.    Leaving for vacation [place], from 7/27 - 8/3.    Noted black stools this morning.  Just started Iron tablets on 7/24/19.  She is going to stop taking them.    Plan:     Spoke with Jaclyn regarding INR result and instructed:     Warfarin Dosing Instructions:   (has 5mg tabs)   - HOLD warfarin dose tonight,   - then change warfarin dose to 2.5 mg daily on Monday, Wednesday, Saturday; and 5 mg daily rest of week.   - (15.4 % change)    Instructed patient to follow up no later than:  1-2 wks.   - scheduled on 8//19 @ [facility].     Education provided: importance of consistent vitamin K intake, target INR goal and significance of current INR result, importance of notifying clinic for changes in medications, monitoring for bleeding signs and symptoms and when to seek medical attention/emergency care    Jaclyn verbalizes understanding and agrees to warfarin dosing plan.    Instructed to call the [facility] for any changes, questions or concerns. (#[phone])   ?   Dory Montenegro RN    Subjective/Objective:      Jaclyn Gibbs, a 86 y.o. female is on warfarin.     Jaclyn reports:     Home warfarin dose: as updated on anticoagulation calendar per template     Missed doses: No     Medication changes:  Yes:  New RX on 7/24/19 - Ferrous Sulfate 325mg one tablet three times a day.     S/S of bleeding or thromboembolism:  No     New Injury or illness:  Yes:   She informed Dr. Banks today - she will be stopping Iron tablets today, which she started on 7/24, due to black stools noted this morning.     Changes in  diet or alcohol consumption:  No     Upcoming surgery, procedure or cardioversion:  No    Anticoagulation Episode Summary     Current INR goal:      TTR:   72.4 % (1.9 y)   Next INR check:   9/5/2019   INR from last check:   3.80! (7/26/2019)   Goal at result date:   2.0-3.0   Weekly max warfarin dose:      Target end date:   Indefinite   INR check location:      Preferred lab:      Send INR reminders to:   [facility]    Indications    Other acute pulmonary embolism without acute cor pulmonale (H) (Resolved) [I26.99]           Comments:   Goal range 2.0 - 2.5 per Dr. Cardona, 12/12/18         Anticoagulation Care Providers     Provider Role Specialty Phone number    Robin Catherine MD Referring Internal Medicine [phone]

## 2021-05-30 NOTE — TELEPHONE ENCOUNTER
ANTICOAGULATION  MANAGEMENT    Assessment     Today's INR result of 4.80 is Supratherapeutic (goal INR of 2.0 - 2.5)        Warfarin taken as previously instructed    No new diet changes affecting INR    Potential interaction between Tramadol / ES- Tylenol and warfarin which may affect subsequent INRs.  In addition, to bad flare-up of arthritis inflammation.    Continues to tolerate warfarin with no reported s/s of bleeding or thromboembolism     Previous INR was Therapeutic at 2.15 on 6/27/19.    Plan:     Spoke with Jaclyn regarding INR result and instructed:    1.  Advised to eat an extra helping of salad or vegetable of her choice for the next 2 days.   2.  Follow safety precautions with known supra INR.    Warfarin Dosing Instructions:  (has 5mg tabs)   - today and tomorrow, 7/10-11  Advised to HOLD warfarin dose,   - then continue current warfarin dose 5 mg daily.    Instructed patient to follow up no later than:  5-7 days.   - scheduled on Mon. 7/15/19 @ [facility],    Education provided: impact of vitamin K foods on INR, target INR goal and significance of current INR result, potential interaction between warfarin and Tramadoll and ES-Tylenol and importance of notifying clinic for changes in medications    Jaclyn verbalizes understanding and agrees to warfarin dosing plan.    Instructed to call the [facility] for any changes, questions or concerns. (#[phone])   ?   Dory Montenegro RN    Subjective/Objective:      Jaclyn Gibbs, a 86 y.o. female is on warfarin.     Jaclyn reports:     Home warfarin dose: as updated on anticoagulation calendar per template     Missed doses: No     Medication changes:  Yes: reported taking ES-tylenol  1-2 tabs for bad arthritic pains.  (not to exceed 6 tabs, per ED).   -Tramadol 50mg 1-2 tablets q6hrs, PRN for pain. (However, this made her sick and stopped).             - Ativan and Flexaril 10mg one tablet two times a day, PRN for muscle spasm, and Lidocaine 4%  cream applied three times a day, PRN.   - per [employer], Concurrent use of TRAMADOL and WARFARIN may result in an increase in prothrombin time and an increased risk of bleeding.      S/S of bleeding or thromboembolism:  No     New Injury or illness:  Yes:  Reported bad flare-up of arthritis and taking more Tylenol than usual.   - Presented in ED on 6/28/19 regarding a FALL incurred on 6/26, and hurt her elbow.       Changes in diet or alcohol consumption:  No     Upcoming surgery, procedure or cardioversion:  No    Anticoagulation Episode Summary     Current INR goal:      TTR:   73.7 % (1.9 y)   Next INR check:   7/17/2019   INR from last check:   4.80! (7/10/2019)   Goal at result date:   2.0-3.0   Weekly max warfarin dose:      Target end date:   Indefinite   INR check location:      Preferred lab:      Send INR reminders to:   [facility]    Indications    Other acute pulmonary embolism without acute cor pulmonale (H) [I26.99]           Comments:   Goal range 2.0 - 2.5 per Dr. Cardona, 12/12/18         Anticoagulation Care Providers     Provider Role Specialty Phone number    Robin Catherine MD Referring Internal Medicine [phone]

## 2021-05-30 NOTE — TELEPHONE ENCOUNTER
----- Message from Leyla Eugene sent at 7/12/2019 11:49 AM CDT -----  Contact: Patient  Caller: Jaclyn    Primary cardiologist: Dr. Cardona    Detailed reason for call: Jaclyn states she's short of breath. Please call back.     Best phone number: [phone]    Best time to contact: Today    Ok to leave a detailed message? Yes    Device? No

## 2021-05-30 NOTE — TELEPHONE ENCOUNTER
ANTICOAGULATION  MANAGEMENT    Assessment     Today's INR result of 2.00 is Therapeutic (goal INR of 2.0 - 2.5)        Warfarin recently held as instructed which may be affecting INR    No new diet changes affecting INR    No new medication/supplements affecting INR    Continues to tolerate warfarin with YES reported s/s of bleeding    reported rectal bleeding yesterday after BM.    Previous INR was Supratherapeutic at 5.10 on 7/15/19.    Plan:     Spoke with Jaclyn regarding INR result and instructed:     Warfarin Dosing Instructions:   (has 5mg tabs)    - Change warfarin dose to 2.5 mg daily on Saturdays; and 5 mg daily rest of week.   - (7.1 % change)    Instructed patient to follow up no later than:     Scheduled on 7/22/19, during f/u with Dr. Casillas @ [facility].    Education provided: importance of consistent vitamin K intake, target INR goal and significance of current INR result, monitoring for bleeding signs and symptoms and when to seek medical attention/emergency care    Candace verbalizes understanding and agrees to warfarin dosing plan.    Instructed to call the [facility] for any changes, questions or concerns. (#[phone])   ?   Dory Montenegro RN    Subjective/Objective:      Jaclyn Gibbs, a 86 y.o. female is on warfarin.     Jaclyn reports:     Home warfarin dose: as updated on anticoagulation calendar per template     Missed doses: Yes:  Held warfarin doses on 7/16 - 17.     Medication changes:  No     S/S of bleeding or thromboembolism:  Yes:  Reported rectal bleeding just yesterday after BM.     - she reported this has happened before and is ongoing for a long time, even before being started on warfarin therapy.  She has known hemorrhoid.    New Injury or illness:  Yes:  Reported headaches and relates it to her neck pains.     Changes in diet or alcohol consumption:  No     Upcoming surgery, procedure or cardioversion:  No    Anticoagulation Episode Summary     Current INR  goal:      TTR:   73.0 % (1.9 y)   Next INR check:   7/25/2019   INR from last check:   2.00 (7/18/2019)   Goal at result date:   2.0-3.0   Weekly max warfarin dose:      Target end date:   Indefinite   INR check location:      Preferred lab:      Send INR reminders to:   [facility]    Indications    Other acute pulmonary embolism without acute cor pulmonale (H) [I26.99]           Comments:   Goal range 2.0 - 2.5 per Dr. Cardona, 12/12/18         Anticoagulation Care Providers     Provider Role Specialty Phone number    Robin Catherine MD Referring Internal Medicine [phone]

## 2021-05-30 NOTE — PROGRESS NOTES
"[facility] Daily Progress     Patient Name: Jaclyn Gibbs  Date: 6/26/2019  Visit #: 3/10  Evaluation date:  6/3/19  Referral Diagnosis: Weakness of both lower extremities  Referring provider: Pancho Banks MD  Visit Diagnosis:     ICD-10-CM    1. Unsteadiness on feet R26.81    2. Bilateral leg weakness R29.898        Precautions / Restrictions : extensive PMH see below       Assessment:     Response to Intervention:  Tolerated manual therapy and new exercises well. She demonstrates a good understanding and performance of her HEP.  She is very motivated and compliant with PT.    Symptoms are consistent with:  Medical diagnosis  Patient is appropriate to continue with skilled physical therapy intervention, as indicated by initial plan of care.    Goal Status:  Pt. will demonstrate/verbalize independence in self-management of condition in : 6 weeks  Pt. will be independent with home exercise program in : 6 weeks  Pt. will show improved balance for safer : ambulation;standing  Pt. will improve posture : and demonstrate posture with minimal to no cuing;in 6 weeks  Patient will stand : 10 minutes;with no pain;for home chores;in 6 weeks;with less difficultty  Patient will look up / down: in 6 weeks (without imbalance)    No data recorded  Other functional progress:           Plan / Patient Education:     Continue with initial plan of care.  Progress with home program as tolerated.  Core strengthening, static and dynamic balance exercises, continue manual therapy if appropriate.    Subjective:     Pain Rating:  Resting 0  Activity:  0    Response to last treatment: \"felt OK\"  HEP- Frequency: 2x/day, Questions or difficulties:  After doing the exercises her legs are very tired.    Patient reports:    Her legs feel shaky today and she is feeling a little lightheaded.    She had a massage yesterday and her neck was feeling very good.    She was exceptionally lightheaded yesterday.  Last night when " "she got up to go to the bathroom she fell.  She reports not hitting her head. No headache or nausea.    Objective:       Mild to moderate myofascial restriction bilateral: quads, hamstrings, gastroc    Treatment Today   Exercises below represent all exercise the patient has done in the clinic and HEP.  Please see today's exercise flow-sheet for specifics of today's exercise performance.   Exercises:  Exercise #1: sit to stand  Comment #1: 10 reps x 2 sets without UE support  Exercise #2: standing hip abduction  Comment #2: 2 x 10 bilateral  Exercise #3: feet together eyes open  Comment #3: 60\" miminal  postural sway  Exercise #4: feet together eyes closed   Comment #4: 45\" x  3 multiple touches each rep,,    fingertips on counter at home for safety  Exercise #5: nustep   Comment #5: 3 minutes with reistance 5 with discussion of HEP, current symptoms and progress toward goals.  Exercise #6: partial tandem stance- heel even with toe cues for even weight distribution  Comment #6: 30\" x 2 bilateral each  2-3 touches per rep  Exercise #7: ab set in hooklying, sitting and standing   Comment #7: 3\" x 10 in each positioin, cues for posture, breathing and re-instruction for exercises  Exercise #8: bridging  Comment #8: 2 x 10 cues for foot placement, lifting and lowering, slow controlled movement, lifting hips slightly higher.  Exercise #9: slow high march in place in parallel bars  Comment #9: 30\" x 2   Exercise #10: SLS   Comment #10: 3\"-5\" x 5 bilateral  Exercise #11: LTR  Comment #11: 10  x 2 bilateral            TREATMENT MINUTES COMMENTS   Evaluation     Self-care/ Home management     Manual therapy     Neuromuscular Re-education 20    Therapeutic Activity 25    Therapeutic Exercises  See exercise flow-sheet for details.    Gait training     Modality__________________                Total 45 Patient 10 minutes late for appointment   Blank areas are intentional and mean the treatment did not include these items. "       Hong Medel, PT  6/26/2019

## 2021-05-30 NOTE — TELEPHONE ENCOUNTER
Returned pt's call to discuss concerns. Pt states she has noticed increased SOB that has occurred over the last 1-2 weeks accompanied with weakness in her legs. She denies edema or weight gain. Pt states she was calling to make an appointment sooner than 8-19 if possible. Confirmed med list. Next available [facility] appointment 8-22        Dr. Cardona, pt complains of increased shortness of breath over the past 2 weeks in addition to leg weakness. FU scheduled with you 8/19. No sooner appointments available. Any new recommendations?

## 2021-05-30 NOTE — PATIENT INSTRUCTIONS - HE
Jaclyn,    It was a pleasure to see you today at the [facility].     My nurse or the team of Dr. Cardona will call you with the newly ordered test results.    If these tests do not find a cardiac cause of your symptoms, I recommend that you discuss your difficulties with Dr. Banks.    Please call us if you have any questions or concerns about your heart.      Vahe Bolaños M.D.

## 2021-05-30 NOTE — TELEPHONE ENCOUNTER
Please help schedule imaging. When I tried to call patient she was out and will return in a couple hours.   Oksana Bourgeois CSS

## 2021-05-30 NOTE — TELEPHONE ENCOUNTER
ANTICOAGULATION  MANAGEMENT    Assessment     Today's INR result of 1.80 is Subtherapeutic (goal INR of 2.0 - 2.5)        Warfarin taken as previously instructed    No new diet changes affecting INR    No new medication/supplements affecting INR    Continues to tolerate warfarin with YES reported s/s of bleeding (rectum)    Previous INR was Subtherapeutic at 2.00 on 7/18/19.    Leaving for one week on 7/27/19.    Plan:     Spoke with Jaclyn regarding INR result and instructed:    1.  Advised to f/u with Dr. Banks due to rectal bleeding.   2.  Will continue same dose, since 2 days of warfarin held 4 days ago.    Warfarin Dosing Instructions:   (has 5mg tabs)   - Continue current warfarin dose 2.5 mg daily on Saturdays; and 5 mg daily rest of week.    Instructed patient to follow up no later than:  One wk.   - she will make OV and INR check this week with Dr. Banks - rectal bleed / shortness of breath / lightheaded / weak legs and INR check.    Education provided: importance of consistent vitamin K intake, target INR goal and significance of current INR result, importance of notifying clinic for changes in medications, monitoring for bleeding signs and symptoms and when to seek medical attention/emergency care    Jaclyn, verbalizes understanding and agrees to warfarin dosing plan.    Instructed to call the [facility] for any changes, questions or concerns. (#[phone])   ?   Dory Montenegro RN    Subjective/Objective:      Jaclyn Gibbs, a 86 y.o. female is on warfarin.     Jaclyn reports:     Home warfarin dose: as updated on anticoagulation calendar per template     Missed doses: No     Medication changes:  No     S/S of bleeding or thromboembolism:  Yes:  Reported rectal bleeding just noted again yesterday when wiping after BM.  (she related this to hemorrhoids).     New Injury or illness:  Yes:   f/u today with cardiologist, Dr. Bolaños - shortness of breath on exertion, which has failed to  improve despite recent coronary stenting in August 2018.  Said symptoms were not heart related.     Changes in diet or alcohol consumption:  No     Upcoming surgery, procedure or cardioversion:  No    Anticoagulation Episode Summary     Current INR goal:      TTR:   72.6 % (1.9 y)   Next INR check:   7/26/2019   INR from last check:   1.80! (7/22/2019)   Goal at result date:   2.0-3.0   Weekly max warfarin dose:      Target end date:   Indefinite   INR check location:      Preferred lab:      Send INR reminders to:   [facility] [place]    Indications    Other acute pulmonary embolism without acute cor pulmonale (H) (Resolved) [I26.99]           Comments:   Goal range 2.0 - 2.5 per Dr. Cardona, 12/12/18         Anticoagulation Care Providers     Provider Role Specialty Phone number    Robin Catherine MD Referring Internal Medicine [phone]

## 2021-05-30 NOTE — TELEPHONE ENCOUNTER
Jaclyn called back,     - to report she already took her 5mg warfarin dose this morning.     - inquired on what Vitamin-K foods to eat.    Does not like kale / spinach / or broccoli.    Advised to eat extra helping of salad and vegetables for lunch and dinner for the next 3 days.     - ensure to take out her warfarin tablets in her wkly pill box for Tuesday / Wednesday dose.  Till instructed to resume.

## 2021-05-30 NOTE — TELEPHONE ENCOUNTER
From: Stacey Cardona MD  Sent: 7/12/2019  12:43 PM  To: Leigha Askew RN  If shortness of breath bad would recommend rac if patient a little hesitant, could check BNP, troponin, hemoglobin as well as creatinine.  To make sure no new issues as well as a potassium.  Can we also get ECG?  Maybe we could look at all those things by next Monday or Tuesday.  LF      Called pt who is agreeable to setting up RAC. Unable to transfer due to long waiting times. Msg sent to schedulers to arrange apt. -kcl

## 2021-05-30 NOTE — TELEPHONE ENCOUNTER
"ANTICOAGULATION  MANAGEMENT    Assessment     Today's INR result of 5.10 is Supratherapeutic (goal INR of 2.0  2.5)        Warfarin recently held as instructed which may be affecting INR    - however, Jaclyn reported she forgot to HOLD warfarin on 7/10-11, as instructed.    No new diet changes affecting INR    - reported eating increased \"greens.\"    No new medication/supplements affecting INR    Continues to tolerate warfarin with no reported s/s of bleeding or thromboembolism     Previous INR was Supratherapeutic at 4.80 on 7/1019.    Plan:     Spoke with Jaclyn regarding INR result and instructed:    1.  Important NOT to take her warfarin for 2 days.   2.  Check INR status prior to resuming warfarin therapy on 7/17.   3.  Advised to increase her salad / vegetables for the next 3 days.    Warfarin Dosing Instructions:  (mornings.  has 5mg tabs)   - reported already taking her warfarin dose this morning.   - advised to HOLD warfarin for 2 days, 7/16 -17.    Instructed patient to follow up no later than:  Scheduled on 7/17/19 @ [facility].    Education provided: impact of vitamin K foods on INR, target INR goal and significance of current INR result, monitoring for bleeding signs and symptoms and when to seek medical attention/emergency care    Jaclyn verbalizes understanding and agrees to warfarin dosing plan.    Instructed to call the [facility] for any changes, questions or concerns. (#[phone])   ?   Dory Montenegro RN    Subjective/Objective:      Jaclyn Gibbs, a 86 y.o. female is on warfarin.     Jaclyn reports:     Home warfarin dose: template incorrect; verbally confirmed home dose with Jaclyn and updated on anticoagulation calendar     Missed doses: Yes:  Jaclyn reported, she forgot to HOLD warfarin dose for 2 days on 7/10-11 and had been taking 5mg warfarin daily.     Medication changes:  No     S/S of bleeding or thromboembolism:  No     New Injury or illness:  No     Changes in diet or " alcohol consumption:  No     Upcoming surgery, procedure or cardioversion:  No    Anticoagulation Episode Summary     Current INR goal:      TTR:   73.2 % (1.9 y)   Next INR check:   7/18/2019   INR from last check:   5.10! (7/15/2019)   Goal at result date:   2.0-3.0   Weekly max warfarin dose:      Target end date:   Indefinite   INR check location:      Preferred lab:      Send INR reminders to:   [facility]    Indications    Other acute pulmonary embolism without acute cor pulmonale (H) [I26.99]           Comments:   Goal range 2.0 - 2.5 per Dr. Cardona, 12/12/18         Anticoagulation Care Providers     Provider Role Specialty Phone number    Robin Catherine MD Referring Internal Medicine [phone]

## 2021-05-30 NOTE — TELEPHONE ENCOUNTER
Patient Returning Call  Reason for call:  Patient is returning missed call  Information relayed to patient:  Please call patient when available  Patient has additional questions:  No  If YES, what are your questions/concerns:  n/a  Okay to leave a detailed message?: No call back needed

## 2021-05-30 NOTE — TELEPHONE ENCOUNTER
Patient Returning Call  Reason for call:  Patient returning [facility] call  Information relayed to patient:  That a message would be left for the ACN to return call the patient's call.  Patient has additional questions:  Yes  If YES, what are your questions/concerns:  Please call the patient back, the patient is not sure what the call was regarding, but thinks it may be regarding getting an appointment for tomorrow?  Okay to leave a detailed message?: Yes

## 2021-05-30 NOTE — TELEPHONE ENCOUNTER
Please arrange INR for Jaclyn to have it done tomorrow. I think I already ordered her cervical MRI. Can you check it and call her. Thanks.

## 2021-05-30 NOTE — TELEPHONE ENCOUNTER
Dr. Banks;    Due to narrow INR target goal of 2.0 - 2.5,  Do you agree with treatment plan as outlined on Jaclyn Gibbs?     - she reported rectal bleeding only yesterday, but no more symptoms thereafter.   - she stated this is an ongoing thing and not new.  Even had this symptom prior to starting warfarin.  She reported, she has known hemorrhoids that bleeds from time to time.  (not bad enough to do surgery.)

## 2021-05-30 NOTE — PROGRESS NOTES
Office Visit - Follow Up   Jaclyn Gibbs   86 y.o. female    Date of Visit: 7/24/2019    Chief Complaint   Patient presents with     Dizziness     sx intermittent for months, currently having sx today, pt states sx worse today     Shortness of Breath     sx since last seen in May     Extremity Weakness     bilateral leg weakness since seen in May, pt states sx getting worsed, pt using can last couple of weeks for help with balance     Fall     fell and injured left elbow about 6 wks ago, pt states better now, had x-rayed and no fractures        Assessment and Plan   1. Bilateral leg weakness  Complains of leg weakness.  Suspected to be due to low potassium.  Saw cardiology, Dr. Bolaños.  Had lab which showed low potassium at 3.4.  Apparently stopped taking her potassium.  Was advised by Dr. Bolaños to take her potassium  twice a day.  Continue potassium twice a day and informed her that her leg weakness could be from the low potassium.    2. Iron deficiency anemia secondary to inadequate dietary iron intake  Also was found to have mildly decreased hemoglobin to 9.3 from baseline of 10.  Suspect due to iron deficiency anemia due to inadequate dietary iron intake.  Will try ferrous sulfate 3 times a day until her next visit.  - ferrous sulfate 325 (65 FE) MG tablet; Take 1 tablet (325 mg total) by mouth 3 (three) times a day with meals.  Dispense: 90 tablet; Refill: 3    3. Dizziness  This due to multifactorial causes from her  several medical problems aggravated by her electrolyte imbalance and iron deficiency anemia.  Advised once to be correct her anemia and hypokalemia she will feel better.  In the meantime okay to continue with meclizine as needed.      Follow up in 2 weeks.     History of Present Illness   This 86 y.o. old female was sent back by Dr. Bolaños of cardiology for leg weakness and dizziness.  Was seen by cardiology rapid access clinic for sudden onset of shortness of breath.  Was seen by   Miky on 2019.  Had EKG which showed normal sinus rhythm with some PVCs.  Had laboratory work-up which showed low potassium and low hemoglobin.  Was set up for heart echo but this is not done yet.  Still complains of dizziness and leg weakness.  Apparently she stopped taking her potassium supplement.  Was advised by Dr. Bolaños to resume potassium and to take this twice a day.  Overall, stable.  Does not have other complaints.    Review of Systems   A 12 point comprehensive review of systems was negative except as noted..     Medications, Allergies and Problem List   Reviewed and updated             Chief Complaint   Dizziness (sx intermittent for months, currently having sx today, pt states sx worse today); Shortness of Breath (sx since last seen in May); Extremity Weakness (bilateral leg weakness since seen in May, pt states sx getting worsed, pt using can last couple of weeks for help with balance); and Fall (fell and injured left elbow about 6 wks ago, pt states better now, had x-rayed and no fractures)       Patient Profile   Social History     Social History Narrative    , 6 children, active, is a full code.  (last updated 2017)         Past Medical History   Patient Active Problem List   Diagnosis     Obesity     Osteopenia     Essential hypertension with goal blood pressure less than 140/90     Coronary Artery Disease     Hypercholesteremia     GERD (gastroesophageal reflux disease)     Left lumbar radiculopathy     OAB (overactive bladder)     Anemia       Past Surgical History  She has a past surgical history that includes Appendectomy;  section (); pr ablate heart dysrhythm focus (2014); Cardiac catheterization; Coronary angioplasty (2010); Cataract extraction (Bilateral); Excision basal cell carcinoma; Lumbar laminectomy (); Total knee arthroplasty (Right, 2003); Total knee arthroplasty (Left, 2008); Coronary Angiogram (N/A, 2018); and Left  Heart Catheterization Without Left Ventriculogram (Left, 8/23/2018).       Medications and Allergies   Current Outpatient Medications   Medication Sig     amLODIPine (NORVASC) 5 MG tablet TAKE 1 TABLET (5 MG TOTAL) BY MOUTH DAILY.     CALCIUM CARB/MAGNESIUM OXID/D3 (CALCIUM MAGNESIUM + D ORAL) Take by mouth 2 (two) times a day. 750/300/500mg     fluocinonide (LIDEX) 0.05 % cream Apply topically 2 (two) times a day. (Patient taking differently: Apply topically 2 (two) times a day as needed. )     GLUCOSAMINE/CHONDROITIN SULF A (GLUCOSAMINE-CHONDROITIN ORAL) Take 2 tablets by mouth daily.     indapamide (LOZOL) 2.5 MG tablet TAKE 1 TABLET EVERY DAY     lidocaine (LMX) 4 % cream Apply topically 3 (three) times a day as needed. pain     losartan (COZAAR) 50 MG tablet Take 1 tablet (50 mg total) by mouth daily.     multivitamin (ONE A DAY) per tablet Take 1 tablet by mouth daily.     nitroglycerin (NITROSTAT) 0.4 MG SL tablet Place 1 tablet (0.4 mg total) under the tongue as needed for chest pain (Keep in original bottle).     oxybutynin (DITROPAN XL) 5 MG ER tablet Take 1 tablet (5 mg total) by mouth daily.     potassium chloride (K-DUR,KLOR-CON) 10 MEQ tablet Take 1 tablet (10 mEq total) by mouth daily.     warfarin (COUMADIN/JANTOVEN) 5 MG tablet Take 1 or 1 1/2 tabs daily Adjust dose based on INR results as directed..     atorvastatin (LIPITOR) 40 MG tablet Take 1 tablet (40 mg total) by mouth at bedtime.     clopidogrel (PLAVIX) 75 mg tablet Take 1 tablet (75 mg total) by mouth daily.     ferrous sulfate 325 (65 FE) MG tablet Take 1 tablet (325 mg total) by mouth 3 (three) times a day with meals.     Allergies   Allergen Reactions     Oxycodone Nausea And Vomiting     Codeine Nausea And Vomiting     Diazepam Nausea Only     Morphine Nausea And Vomiting        Family and Social History   Family History   Problem Relation Age of Onset     Stroke Mother 88     Hypertension Father      Sudden death Father 70         "suspected heart attack      Breast cancer Sister      Venous thrombosis Sister 80        At age 80, and had breast CA     Cirrhosis Sister 34     Aneurysm Sister      Other Brother 25        Killed in WWII.     Brain cancer Daughter 5     No Medical Problems Daughter      No Medical Problems Son      No Medical Problems Son      No Medical Problems Son      Dementia Sister         Social History     Tobacco Use     Smoking status: Never Smoker     Smokeless tobacco: Never Used   Substance Use Topics     Alcohol use: No     Drug use: No        Physical Exam       Physical Exam  /74 (Patient Site: Right Arm)   Pulse 60   Ht 5' 3\" (1.6 m)   Wt 160 lb (72.6 kg)   SpO2 92%   BMI 28.34 kg/m    General appearance: alert, appears stated age, cooperative and weak  Head: Normocephalic, without obvious abnormality, atraumatic  Throat: lips, mucosa, and tongue normal; teeth and gums normal  Neck: no adenopathy, no carotid bruit, no JVD, supple, symmetrical, trachea midline and thyroid not enlarged, symmetric, no tenderness/mass/nodules  Lungs: clear to auscultation bilaterally  Heart: regular rate and rhythm, S1, S2 normal, no murmur, click, rub or gallop  Abdomen: soft, non-tender; bowel sounds normal; no masses,  no organomegaly  Extremities: extremities normal, atraumatic, no cyanosis or edema  Skin: Skin color, texture, turgor normal. No rashes or lesions   Neurologic: Normal exam     Additional Information        Pancho Banks MD  Internal Medicine  Contact me at [phone]     Additional Information   Current Outpatient Medications   Medication Sig     amLODIPine (NORVASC) 5 MG tablet TAKE 1 TABLET (5 MG TOTAL) BY MOUTH DAILY.     CALCIUM CARB/MAGNESIUM OXID/D3 (CALCIUM MAGNESIUM + D ORAL) Take by mouth 2 (two) times a day. 750/300/500mg     fluocinonide (LIDEX) 0.05 % cream Apply topically 2 (two) times a day. (Patient taking differently: Apply topically 2 (two) times a day as needed. )     " GLUCOSAMINE/CHONDROITIN SULF A (GLUCOSAMINE-CHONDROITIN ORAL) Take 2 tablets by mouth daily.     indapamide (LOZOL) 2.5 MG tablet TAKE 1 TABLET EVERY DAY     lidocaine (LMX) 4 % cream Apply topically 3 (three) times a day as needed. pain     losartan (COZAAR) 50 MG tablet Take 1 tablet (50 mg total) by mouth daily.     multivitamin (ONE A DAY) per tablet Take 1 tablet by mouth daily.     nitroglycerin (NITROSTAT) 0.4 MG SL tablet Place 1 tablet (0.4 mg total) under the tongue as needed for chest pain (Keep in original bottle).     oxybutynin (DITROPAN XL) 5 MG ER tablet Take 1 tablet (5 mg total) by mouth daily.     potassium chloride (K-DUR,KLOR-CON) 10 MEQ tablet Take 1 tablet (10 mEq total) by mouth daily.     warfarin (COUMADIN/JANTOVEN) 5 MG tablet Take 1 or 1 1/2 tabs daily Adjust dose based on INR results as directed..     atorvastatin (LIPITOR) 40 MG tablet Take 1 tablet (40 mg total) by mouth at bedtime.     clopidogrel (PLAVIX) 75 mg tablet Take 1 tablet (75 mg total) by mouth daily.     ferrous sulfate 325 (65 FE) MG tablet Take 1 tablet (325 mg total) by mouth 3 (three) times a day with meals.     Allergies   Allergen Reactions     Oxycodone Nausea And Vomiting     Codeine Nausea And Vomiting     Diazepam Nausea Only     Morphine Nausea And Vomiting     Social History     Tobacco Use     Smoking status: Never Smoker     Smokeless tobacco: Never Used   Substance Use Topics     Alcohol use: No     Drug use: No         Time: total time spent with the patient was 25 minutes of which >50% was spent in counseling and coordination of care

## 2021-05-30 NOTE — TELEPHONE ENCOUNTER
ANTICOAGULATION  MANAGEMENT: Discharge Continuity of Care Review    Emergency room visit on  6/27/19 for LEFT elbow pain.   - fell 2 days ago and increasing left elbow pain.   - CT scan, showed possible bone fragment with small effusion.  No Fx's.    Discharge disposition: Home    INR Results:       Recent labs: (last 7 days)     06/27/19  1850   INR 2.15*       Warfarin inpatient management: Home regimen continued    Warfarin discharge instructions: home regimen continued     Medication Changes Affecting Anticoagulation: Yes: Tramadol 50mg 1-2 tablets q6hrs, PRN for pain.   - Ativan and Flexaril 10mg one tablet two times a day, PRN for muscle spasm, and Lidocaine 4% cream applied three times a day, PRN    Additional Factors Affecting Anticoagulation: No    Plan     No adjustment to anticoagulation plan needed    Recommended follow up is scheduled    Anticoagulation calendar updated    Dory Montenegro RN

## 2021-05-31 ENCOUNTER — RECORDS - HEALTHEAST (OUTPATIENT)
Dept: ADMINISTRATIVE | Facility: CLINIC | Age: 86
End: 2021-05-31

## 2021-05-31 VITALS — WEIGHT: 164 LBS | BODY MASS INDEX: 29.06 KG/M2 | HEIGHT: 63 IN

## 2021-05-31 VITALS — WEIGHT: 168.7 LBS | HEIGHT: 63 IN | BODY MASS INDEX: 29.89 KG/M2

## 2021-05-31 VITALS — HEIGHT: 63 IN | BODY MASS INDEX: 29.06 KG/M2 | WEIGHT: 164 LBS

## 2021-05-31 VITALS — BODY MASS INDEX: 29.23 KG/M2 | WEIGHT: 165 LBS | HEIGHT: 63 IN

## 2021-05-31 VITALS — BODY MASS INDEX: 29.41 KG/M2 | HEIGHT: 63 IN | WEIGHT: 166 LBS

## 2021-05-31 VITALS — BODY MASS INDEX: 28.53 KG/M2 | HEIGHT: 63 IN | WEIGHT: 161 LBS

## 2021-05-31 VITALS — WEIGHT: 162 LBS | BODY MASS INDEX: 28.7 KG/M2 | HEIGHT: 63 IN

## 2021-05-31 VITALS — HEIGHT: 63 IN | BODY MASS INDEX: 29.27 KG/M2 | WEIGHT: 165.2 LBS

## 2021-05-31 VITALS — WEIGHT: 162.75 LBS | BODY MASS INDEX: 28.83 KG/M2

## 2021-05-31 NOTE — TELEPHONE ENCOUNTER
"Patient calling as she has not received a response to a [employer] message she sent a couple of days ago. See this for further information.     She is hoping to schedule another injection ASAP as her pain at its best is a 8/10 now. Review of chart shows that she has had 4 injection in last 12 months (starting 9/19/18 and last one was 6/4/19.) Explained this to her. She states she does not feel she can wait that long for another injection. \"I am hardly able to get around right now. I have to use crutches.\" Patient reports she has never had oral steroids for her pain before. Please advise if OK to schedule for repeat injection.   "

## 2021-05-31 NOTE — TELEPHONE ENCOUNTER
ANTICOAGULATION  MANAGEMENT    Assessment     Today's INR result of 2.80 is Supratherapeutic (goal INR of 2.0 - 2.5)        More warfarin taken than instructed which may be affecting INR    No new diet changes affecting INR    No interaction expected between will restart her Iron tablets and warfarin    Continues to tolerate warfarin with no reported s/s of bleeding or thromboembolism     Previous INR was Supratherapeutic at 3.80 on 7/26/19.    Just returned from [facility] on 8/3/19.    Plan:     Spoke with Jaclyn regarding INR result and instructed:     Warfarin Dosing Instructions:    (has 5mg tabs)   - today, advised to HOLD warfarin dose,   - then continue current warfarin dose 2.5 mg daily on Monday, Wednesday, Saturday; and 5 mg daily rest of week.    Instructed patient to follow up no later than:  1-2 wks.   - scheduled on 8/19/19 during f/u with Dr. Cardona @ [facility].    Education provided: importance of consistent vitamin K intake, target INR goal and significance of current INR result and no interaction anticipated between warfarin and will resume her Iron tablets    Jaclyn verbalizes understanding and agrees to warfarin dosing plan.    Instructed to call the [facility] for any changes, questions or concerns. (#[phone])   ?   Dory Montenegro RN    Subjective/Objective:      Jaclyn MEDINA Dennisnbyumiko, a 86 y.o. female is on warfarin.     Jaclyn reports:     Home warfarin dose: verbally confirmed home dose with Jaclyn and updated on anticoagulation calendar     Missed doses: Yes:  Reported holding warfarin on 7/26, as instructed.     Medication changes:  Yes:  Per template taking more warfarin dose than instructed.  However, she reported she was doing it by memory and did not have her blue slip.   - will restart her Iron tablets.  Normal variant to have black stools with iron tablets.   - she reported, black stools resolved when she stopped iron tablets.     S/S of bleeding or  thromboembolism:  No     New Injury or illness:  No     Changes in diet or alcohol consumption:  No     Upcoming surgery, procedure or cardioversion:  No    Anticoagulation Episode Summary     Current INR goal:      TTR:   71.7 % (1.9 y)   Next INR check:   8/19/2019   INR from last check:   2.80 (8/5/2019)   Goal at result date:   2.0-3.0   Weekly max warfarin dose:      Target end date:   Indefinite   INR check location:      Preferred lab:      Send INR reminders to:   [facility]    Indications    Other acute pulmonary embolism without acute cor pulmonale (H) (Resolved) [I26.99]           Comments:   Goal range 2.0 - 2.5 per Dr. Cardona, 12/12/18         Anticoagulation Care Providers     Provider Role Specialty Phone number    Robin Catherine MD Referring Internal Medicine [phone]

## 2021-05-31 NOTE — TELEPHONE ENCOUNTER
Called patient and she stated no need for PCP to review MR Cervical Spine. She has been working with [facility] to schedule another injection.     She would prefer a call from Spine clinic to schedule another injection as she is in a lot pain.     Donita Ruby LPN

## 2021-05-31 NOTE — TELEPHONE ENCOUNTER
Called today re: echo results 8/27/19. She reported not paying much attention to how she has been feeling after stopping plavix. Will check in again next week with patient. -[facility]

## 2021-05-31 NOTE — TELEPHONE ENCOUNTER
Risk of exceeding four injections per year is increased chance of developing or worsening osteoporosis.  If patient it willing to accept this risk, okay to enter order for repeat injection.

## 2021-05-31 NOTE — TELEPHONE ENCOUNTER
"Phone call to pt to discuss. Explained the risks of more steroid injections than recommended in 12 months time. \"I don't have much choice. I can't lay around here like this in pain.\" Injection requirements reviewed with patient. Stated understanding. Explained a  would contact her to set up the injection appointment when they are available.   "

## 2021-05-31 NOTE — TELEPHONE ENCOUNTER
Dr. Banks.     - do you agree with warfarin dosing as outlined on Jaclyn?    (she has narrow INR target goal of 2.0 - 2.5)     - I also advised her to resume her Iron tablets, as instructed.

## 2021-05-31 NOTE — PATIENT INSTRUCTIONS - HE
Ms Jcalyn Gibbs,  I enjoyed visiting with you again today.  I am glad to hear you are doing well.  Per our conversation at the end of this week stop PLAVIX and restart a baby ASPIRIN a day.  In 2 weeks stop the ATORVASTATIN and call me to let me know how you are doing.  We will also get an echo of the heart.  I will plan on seeing you 6 months or sooner if needed.  Chago Cardona

## 2021-05-31 NOTE — TELEPHONE ENCOUNTER
----- Message from Stacey Cardona MD sent at 8/19/2019  2:10 PM CDT -----  Saw this lady today for continued lightheadedness as well as shortness of breath.  Please let her know potassium is normal based on blood test, please make sure she understands to stop her clopidogrel the end of this week, also make sure that she understands in a week to hold the atorvastatin for a month, we should check in with her in about 2 to 3 weeks to see how her symptoms are going.  Lastly, she is to get an echocardiogram.  I do not feel like she completely understands her comprehends her current situation.  LF        Called patient and updated on BMP results. Discussed how she is to stop her plavix at the end of the week and hold her atorvastatin for a month. She has this written down in her notes from yesterday. She will expect a call from writer in 2-3 weeks to check in on her. Echocardiogram is scheduled for 8/26/19. -toshia

## 2021-05-31 NOTE — TELEPHONE ENCOUNTER
ANTICOAGULATION  MANAGEMENT    Assessment     Today's INR result of 1.80 is Subtherapeutic (goal INR of 2.0 - 2.5)        will need to verify daily warfarin doses,    No new diet changes affecting INR    Potential interaction between discontinue Plavix and restart baby Aspirin / CBD oil also started and warfarin which may affect subsequent INRs    Continues to tolerate warfarin with no reported s/s of bleeding or thromboembolism     Previous INR was Supratherapeutic at 2.80 on 8/5/19.    Plan:     Spoke with Mario regarding INR result and instructed:    1.  Need to verify daily warfarin doses.  Template had wrong written dosing instructions.    Warfarin Dosing Instructions:   (has 5 mg tabs)   - tonight, advised to take 5mg warfarin dose.   - thereafter, resume and continue same warfarin dose with 2.5mg on Monday, Wednesday, Saturday; and 5 mg rest of the week.     - will f/u with Jaclyn on 8/20/19 before 10am.    Instructed patient to follow up no later than:  2 wks.   - scheduled on 8/28/19 during f/u visit with Dr. Banks.    Education provided: importance of consistent vitamin K intake, target INR goal and significance of current INR result, potential interaction between warfarin and CBD Oil / restart Aspirin / discontinued Plavix and importance of notifying clinic for changes in medications    Mario verbalizes understanding and agrees to warfarin dosing plan.    Instructed to call the [facility] for any changes, questions or concerns. (#[phone])   ?   Dory Montenegro RN    Subjective/Objective:      Jaclyn Gibbs, a 86 y.o. female is on warfarin.     Jaclyn reports:     Home warfarin dose: template incorrect; verbally confirmed home dose with Jaclyn and updated on anticoagulation calendar     Missed doses: No     Medication changes:  Yes:  On 8/19/19, discontinued Plavix and restart Aspirin 81mg daily.     S/S of bleeding or thromboembolism:  No     New Injury or illness:   Yes:   f/u visit today with Dr. Cardona - occasional shortness of breath.     - In addition, continues to complain of neck pains. (MRI of CS done on 8/6/19).   - s/p PCI on 8/23/18.    Changes in diet or alcohol consumption:  No     Upcoming surgery, procedure or cardioversion:  No    Anticoagulation Episode Summary     Current INR goal:      TTR:   71.9 % (2 y)   Next INR check:   9/2/2019   INR from last check:   1.80! (8/19/2019)   Goal at result date:   2.0-3.0   Weekly max warfarin dose:      Target end date:   Indefinite   INR check location:      Preferred lab:      Send INR reminders to:   [facility]    Indications    Other acute pulmonary embolism without acute cor pulmonale (H) (Resolved) [I26.99]           Comments:   Goal range 2.0 - 2.5 per Dr. Cardona, 12/12/18         Anticoagulation Care Providers     Provider Role Specialty Phone number    Robin Catherine MD Referring Internal Medicine [phone]

## 2021-05-31 NOTE — PROGRESS NOTES
[facility] Follow-up Note    Assessment & Plan        1. Coronary atherosclerosis due to lipid rich plaque -due to increased shortness of breath she underwent stress testing in August 2018 showing medium-sized area of moderate to severe ischemia involving the mid to distal anteroseptal and anterior wall of the left ventricle with a medium-sized area of distal anteroseptal and anterior transmural scar with ejection fraction of 52%.  This prompted angiography August 23, 2018 showing normal left main, proximal LAD 25% lesion with a totally occluded first diagonal which was intervened upon with a stent, normal circumflex and normal right coronary artery.  Following this her symptoms are not improved and I doubt that coronary artery disease as the cause of her shortness of breath given that recheck stress test several months ago showed resolution of the ischemia.  We will go ahead and arrange to discontinue Plavix now since a year out and will restart baby aspirin 81 mg a day.     2. Essential hypertension with goal blood pressure less than 140/90 -under good control on numerous medications.  Specifically amlodipine, Lozol, losartan.   3. Hypercholesteremia -cholesterol 141 with an LDL of 62 for September 2018 which is excellent.   4. Obesity, unspecified -work on weight loss.   5. NIELSON (dyspnea on exertion) -chronic and really no better with intervention.  [facility] due to this and still no issues.  Will check echo and a pulmonary hypertension present repeat VQ scan given history of pulmonary emboli.  Could be due to anemia and she is on iron for this and wonder if it would be reasonable to give her transfusion.  Will stop Plavix and if no better following this due to better blood count we will stop atorvastatin.  She will give me a call and let me know how she is doing with this.   6. Iron deficiency anemia secondary to inadequate dietary iron intake -hemoglobin 9.7 as above.    7.  Dizziness-not really dizziness but more of a uncertainty where to put her feet.  CV gets better off her atorvastatin but consider neurological evaluation.  8.  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  She is having occasional skips on auscultation today and given her increased symptomatology ACT monitor showed frequent PACs without any true atrial fibrillation.  She is already anticoagulated but if symptomatic tachycardia or bradycardia is associated with her symptoms then I can address.    Plan  1.  Check renal profile today to make sure potassium better.  2.  Check echo and a pulmonary hypertension present consider VQ scan.  3.  Would strongly recommend neurological evaluation given her unsteadiness on her feet.  4.  Discontinue Plavix.  5.  Restart baby aspirin.  6.  Discontinue atorvastatin given her age and possible side effects.  7.  Follow-up with me in about 6 months given all these issues.    Subjective  CC: 86-year-old white female here for an urgent add-on visit.  She is still having shortness of breath which is been bad for about the last year to 2 years, just walking makes her winded.  She still has a lightheadedness, but she describes it is just being unstable on her feet not sure where to put her feet.  There is no chest pain, palpitations, PND, orthopnea or peripheral edema.    Medications  Current Outpatient Medications   Medication Sig     amLODIPine (NORVASC) 5 MG tablet TAKE 1 TABLET (5 MG TOTAL) BY MOUTH DAILY.     atorvastatin (LIPITOR) 40 MG tablet Take 1 tablet (40 mg total) by mouth at bedtime.     CALCIUM CARB/MAGNESIUM OXID/D3 (CALCIUM MAGNESIUM + D ORAL) Take by mouth 2 (two) times a day. 750/300/500mg     clopidogrel (PLAVIX) 75 mg tablet Take 1 tablet (75 mg total) by mouth daily.     ferrous sulfate 325 (65 FE) MG tablet Take 1 tablet (325 mg total) by mouth 3 (three) times a day with meals.      "GLUCOSAMINE/CHONDROITIN SULF A (GLUCOSAMINE-CHONDROITIN ORAL) Take 2 tablets by mouth daily.     indapamide (LOZOL) 2.5 MG tablet TAKE 1 TABLET EVERY DAY     losartan (COZAAR) 50 MG tablet Take 1 tablet (50 mg total) by mouth daily.     multivitamin (ONE A DAY) per tablet Take 1 tablet by mouth daily.     nitroglycerin (NITROSTAT) 0.4 MG SL tablet Place 1 tablet (0.4 mg total) under the tongue as needed for chest pain (Keep in original bottle).     oxybutynin (DITROPAN XL) 5 MG ER tablet Take 1 tablet (5 mg total) by mouth daily.     potassium chloride (K-DUR,KLOR-CON) 10 MEQ tablet Take 1 tablet (10 mEq total) by mouth daily.     warfarin (COUMADIN/JANTOVEN) 5 MG tablet Take 1 or 1 1/2 tabs daily Adjust dose based on INR results as directed..       Objective  /70 (Patient Site: Left Arm, Patient Position: Sitting, Cuff Size: Adult Large)   Pulse 76   Resp 16   Ht 5' 3\" (1.6 m)   Wt 162 lb (73.5 kg)   BMI 28.70 kg/m      General Appearance:    Alert, cooperative, no distress, appears stated age   Head:    Normocephalic, without obvious abnormality, atraumatic   Throat:   Lips, mucosa, and tongue normal; teeth and gums normal   Neck:   Supple, symmetrical, trachea midline, no adenopathy;        thyroid:  No enlargement/tenderness/nodules; no carotid    bruit or JVD   Back:     Symmetric, no curvature, ROM normal, no CVA tenderness   Lungs:     Clear to auscultation bilaterally, respirations unlabored   Chest wall:    No tenderness or deformity   Heart:    Regular rate and rhythm, S1 and S2 normal, no murmur, rub   or gallop   Abdomen:     Soft, non-tender, bowel sounds active all four quadrants,     no masses, no organomegaly   Extremities:   Normal, atraumatic, no cyanosis or edema   Pulses:   2+ and symmetric all extremities   Skin:   Skin color, texture, turgor normal, no rashes or lesions     Results    Lab Results personally reviewed   Lab Results   Component Value Date    CHOL 150 04/17/2019    CHOL " 141 09/17/2018     Lab Results   Component Value Date    HDL 72 04/17/2019    HDL 67 09/17/2018     Lab Results   Component Value Date    LDLCALC 65 04/17/2019    LDLCALC 62 09/17/2018     Lab Results   Component Value Date    TRIG 66 04/17/2019    TRIG 62 09/17/2018     Lab Results   Component Value Date    WBC 3.4 (L) 04/17/2019    HGB 9.3 (L) 07/22/2019    HCT 30.8 (L) 04/17/2019     04/17/2019     Lab Results   Component Value Date    CREATININE 0.93 07/22/2019    BUN 24 07/22/2019     07/22/2019    K 3.4 (L) 07/22/2019    CO2 26 07/22/2019     Reviewed electrocardiogram sinus rhythm with left bundle branch block pattern and frequent ectopy.    Review of Systems:   General: WNL  Eyes: WNL  Ears/Nose/Throat: WNL  Lungs: Shortness of Breath  Heart: Shortness of Breath with activity, Irregular Heartbeat  Stomach: WNL  Bladder: Frequent Urination at Night  Muscle/Joints: Muscle Weakness  Skin: WNL  Nervous System: Daytime Sleepiness, Loss of Balance  Mental Health: WNL     Blood: Easy Bruising, Easy Bleeding

## 2021-05-31 NOTE — TELEPHONE ENCOUNTER
Called and spoke to Jaclyn,     - reported taking the one time booster with 5mg warfarin dose last night.     - will resume her usual wkly warfarin dose.

## 2021-06-01 ENCOUNTER — RECORDS - HEALTHEAST (OUTPATIENT)
Dept: ADMINISTRATIVE | Facility: CLINIC | Age: 86
End: 2021-06-01

## 2021-06-01 VITALS — WEIGHT: 170 LBS | HEIGHT: 63 IN | BODY MASS INDEX: 30.12 KG/M2

## 2021-06-01 VITALS — BODY MASS INDEX: 30.23 KG/M2 | WEIGHT: 170.6 LBS | HEIGHT: 63 IN

## 2021-06-01 VITALS — HEIGHT: 63 IN | BODY MASS INDEX: 30.12 KG/M2 | WEIGHT: 170 LBS

## 2021-06-01 VITALS — BODY MASS INDEX: 30.3 KG/M2 | WEIGHT: 171 LBS | HEIGHT: 63 IN

## 2021-06-01 NOTE — TELEPHONE ENCOUNTER
From: Stacey Cardona MD  Sent: 9/20/2019  10:12 AM  To: Leigha Askew RN  Yes, given her age would discontinue atorvastatin especially if side effects are making her worse.  LF        Med list updated. Plavix and atorvastatin discontinued. -kcl

## 2021-06-01 NOTE — TELEPHONE ENCOUNTER
Jaclyn Knapp has non-standard INR goal of 2.0 - 2.5    Do you agree with treatment plan on 9/13/19?

## 2021-06-01 NOTE — TELEPHONE ENCOUNTER
ANTICOAGULATION  MANAGEMENT    Assessment     Today's INR result of 1.90 is Subtherapeutic (goal INR of 2.0 - 2.5)        Warfarin taken as previously instructed    No new diet changes affecting INR    Potential interaction between Methylprednisone and warfarin which may affect subsequent INRs    Continues to tolerate warfarin with no reported s/s of bleeding or thromboembolism     Previous INR was Subtherapeutic at 1.80 on 9/10/19.    INR checked today, due to scheduled TF-LEO of Lumbar / sacral unilateral steroid injection.    Plan:     Spoke with Jaclyn regarding INR result and instructed:    1.  Will continue same warfarin dose, dose was just increased on 9/10/19 from 27.5mg to 30mg/wk, in addition to non-standard INR goal.    Warfarin Dosing Instructions:    - Continue current warfarin dose 2.5 mg daily on Mon / Thurs; and 5 mg daily rest of week.    Instructed patient to follow up no later than:  One wk.   - scheduled on 9/23/19 during f/u visit with Dr. Banks.    Education provided: target INR goal and significance of current INR result, potential interaction between warfarin and Methylprednisone injection and importance of notifying clinic for changes in medications    Jaclyn verbalizes understanding and agrees to warfarin dosing plan.    Instructed to call the [facility] for any changes, questions or concerns. (#[phone])   ?   Dory Montenegro RN    Subjective/Objective:      Jaclyn Gibbs, a 87 y.o. female is on warfarin.     Jaclyn reports:     Home warfarin dose: verbally confirmed home dose with Jaclyn and updated on anticoagulation calendar     Missed doses: No.  (no stoppage with warfarin and continued daily warfarin doses.     Medication changes:  Yes: today, had steroid injection (Methylprednisone) of lumbar / sacral spine.   - in addition, to stopping Plavix and swithced to Aspirin.     S/S of bleeding or thromboembolism:  No     New Injury or illness:  No     Changes in diet  or alcohol consumption:  No     Upcoming surgery, procedure or cardioversion:  No    Anticoagulation Episode Summary     Current INR goal:      TTR:   57.2 %   Next INR check:   9/20/2019   INR from last check:   1.90! (9/13/2019)   Goal at result date:   2.0-3.0   Weekly max warfarin dose:      Target end date:   Indefinite   INR check location:      Preferred lab:      Send INR reminders to:   [facility]    Indications    Other acute pulmonary embolism without acute cor pulmonale (H) (Resolved) [I26.99]           Comments:   Goal range 2.0 - 2.5 per Dr. Cardona, 12/12/18         Anticoagulation Care Providers     Provider Role Specialty Phone number    Robni Catherine MD Referring Internal Medicine [phone]

## 2021-06-01 NOTE — TELEPHONE ENCOUNTER
ANTICOAGULATION  MANAGEMENT PROGRAM    Jaclyn Gibbs is overdue for INR check.  Reminder call made.    Spoke with Jaclyn who declined to schedule INR at this time.  If calling back, please schedule INR check as soon as possible.    - will call and schedule, once she knows when she will be scheduled for back injection. (awaiting [facility] from [facility]).   - she will come in 2 days prior to procedure.    Dory Montenegro RN

## 2021-06-01 NOTE — TELEPHONE ENCOUNTER
ANTICOAGULATION  MANAGEMENT    Assessment     Today's INR result of 1.80 is Subtherapeutic (goal INR of 2.0 - 2.5)        Warfarin taken as previously instructed    No new diet changes affecting INR    Potential interaction between 9/13/19 Methylprednisone injection and warfarin which may affect subsequent INRs    Continues to tolerate warfarin with no reported s/s of bleeding or thromboembolism     Previous INR was Subtherapeutic at 1.90 on 9/13/19.    Leaving on 10/3-6/19.    Plan:     Spoke with Jaclyn regarding INR result and instructed:     Warfarin Dosing Instructions:  (has 5mg tabs)   - today, advised one time booster with 5mg warfarin dose, then continue current warfarin dose 2.5 mg daily on Mon/Thurs; and 5 mg daily rest of week.    Instructed patient to follow up no later than:  1-2 wks.   - scheduled on 10/7/19 @ [facility].    Education provided: importance of consistent vitamin K intake, target INR goal and significance of current INR result, potential interaction between warfarin and Methylprednisone and importance of notifying clinic for changes in medications    Jaclyn verbalizes understanding and agrees to warfarin dosing plan.    Instructed to call the [facility] for any changes, questions or concerns. (#[phone])   ?   Dory Montenegro RN    Subjective/Objective:      Jaclyn Gibbs, a 87 y.o. female is on warfarin.     Jaclyn reports:     Home warfarin dose: as updated on anticoagulation calendar per template     Missed doses: No     Medication changes:  Yes: vaccinated today with high-dose Flu Shot.   - on 9/13/19 Methylprednisone injecftion of the lumbar spine.     S/S of bleeding or thromboembolism:  No     New Injury or illness:  No     Changes in diet or alcohol consumption:  No     Upcoming surgery, procedure or cardioversion:  No    Anticoagulation Episode Summary     Current INR goal:      TTR:   55.6 %   Next INR check:   9/20/2019   INR from last check:   1.90! (9/13/2019)    Most recent INR:    1.80 (9/23/2019)   Goal at result date:   2.0-3.0   Weekly max warfarin dose:      Target end date:   Indefinite   INR check location:      Preferred lab:      Send INR reminders to:   [facility]    Indications    Other acute pulmonary embolism without acute cor pulmonale (H) (Resolved) [I26.99]           Comments:   Goal range 2.0 - 2.5 per Dr. Cardona, 12/12/18         Anticoagulation Care Providers     Provider Role Specialty Phone number    Robin Catherine MD Referring Internal Medicine [phone]

## 2021-06-01 NOTE — TELEPHONE ENCOUNTER
ANTICOAGULATION  MANAGEMENT    Assessment     Today's INR result of 1.65 is Subtherapeutic (goal INR of 2.0 - 2.5)        Warfarin taken as previously instructed    No new diet changes affecting INR    Potential interaction between Plavix stopped and switched to Aspirin and warfarin which may affect subsequent INRs    Continues to tolerate warfarin with no reported s/s of bleeding or thromboembolism     Previous INR was Subtherapeutic at 1.80 on 8/19/19.    Scheduled for repeat cortisone injection - TF-LEO of lumbar spine on [facility]    Plan:     Spoke with Jaclyn regarding INR result and instructed:     Warfarin Dosing Instructions:   (has 5mg tabs)   - today, advised one time booster with 7.5mg warfarin dose,   - then change warfarin dose to 2.5 mg daily on Mon/Thurs; and 5 mg daily rest of week.   - (9.1 % change)    Instructed patient to follow up no later than: 1-2 wks.   - scheduled on 9/24/19 during f/u with Dr. Banks.    Education provided: importance of consistent vitamin K intake, target INR goal and significance of current INR result and importance of notifying clinic for changes in medications    Jaclyn verbalizes understanding and agrees to warfarin dosing plan.    Instructed to call the [facility] for any changes, questions or concerns. (#[phone])   ?   Dory Montenegro RN    Subjective/Objective:      Jaclyn Gibbs, a 87 y.o. female is on warfarin.     Jaclyn reports:     Home warfarin dose: verbally confirmed home dose with Jaclyn and updated on anticoagulation calendar     Missed doses: No     Medication changes:  No.  Recently stopped Plavix over 3 wks ago and now taking Aspirin 81mg daily.     S/S of bleeding or thromboembolism:  No     New Injury or illness:  No     Changes in diet or alcohol consumption:  No     Upcoming surgery, procedure or cardioversion:  Yes: Scheduled for repeat cortisone injection - TF-LEO of lumbar spine on [facility].   Last injection was in June 2019.   - continuation with warfarin doses during procedure.    Anticoagulation Episode Summary     Current INR goal:      TTR:   58.0 %   Next INR check:   9/24/2019   INR from last check:   1.65! (9/10/2019)   Goal at result date:   2.0-3.0   Weekly max warfarin dose:      Target end date:   Indefinite   INR check location:      Preferred lab:      Send INR reminders to:   [facility]    Indications    Other acute pulmonary embolism without acute cor pulmonale (H) (Resolved) [I26.99]           Comments:   Goal range 2.0 - 2.5 per Dr. Cardona, 12/12/18         Anticoagulation Care Providers     Provider Role Specialty Phone number    Robin Catherine MD Referring Internal Medicine [phone]

## 2021-06-01 NOTE — TELEPHONE ENCOUNTER
Dr. Banks,    Due non-standard INR goal of 2.0 - 2.5    Do you agree with treatment plant on Jaclyn?

## 2021-06-01 NOTE — PATIENT INSTRUCTIONS - HE
Follow-up visit with June Nice in 2 weeks if symptoms worsen or fail to improve.  Otherwise, please follow-up on an as-needed basis going forward.    You will need to schedule an annual evaluation with June prior to your next injection [facility] the [facility].       DISCHARGE INSTRUCTIONS    During office hours (8:00 a.m.- 4:30 p.m.) questions or concerns may be answered  by calling Spine Navigation Nurses at  [phone].     If you experience any problems after hours  please call [phone] and you will be connected to [facility].     All Patients:    ? You may experience an increase in your symptoms for the first 2 days (It may take anywhere between 2 days- 2 weeks for the steroid to have maximum effect).    ? You may use ice on the injection site, as frequently as 20 minutes each hour if needed.    ? You may take your pain medicine.    ? You may continue taking your regular medication after your injection. If you have had a Medial Branch Block you may resume pain medication once your pain diary is completed.    ? You may shower. No swimming, tub bath or hot tub for 48 hours.  You may remove your bandaid/bandage as soon as you are home.    ? You may resume light activities, as tolerated.    ? Resume your usual diet as tolerated.    ? It is strongly advised that you do not drive for 1-3 hours post injection.    ? If you have had oral sedation:  Do not drive for 8 hours post injection.      ? If you have had IV sedation:  Do not drive for 24 hours post injection.  Do not operate hazardous machinery or make important personal/business decisions for 24 hours.      POSSIBLE STEROID SIDE EFFECTS (If steroid/cortisone was used for your procedure)    -If you experience these symptoms, it should only last for a short period      Swelling of the legs                Skin redness (flushing)       Mouth (oral) irritation     Blood sugar (glucose) levels              Sweats                      Mood  changes    Headache    Sleeplessness         POSSIBLE PROCEDURE SIDE EFFECTS  -Call the [facility] if you are concerned    Increased Pain             Increased numbness/tingling        Nausea/Vomiting            Bruising/bleeding at site        Redness or swelling                                                Difficulty walking        Weakness             Fever greater than 100.5    *In the event of a severe headache after an epidural steroid injection that is relieved by lying down, please call the [facility] to speak with a clinical staff member*

## 2021-06-01 NOTE — TELEPHONE ENCOUNTER
Called Jaclyn for an update. She states she's feeling great.  She had a cortisone shot a week ago in her left hip which has improved her energy and physical ability. Confirmed with pt that she stopped taking her Plavix. Asked about stopping her Atorvastatin - pt believes she stopped taking that as well. She checked her pill bottles and did not see atorvastatin. She states her light headedness and shortness of breath are much improved.       Dr. Cardona, just an update in Jaclyn -  her lightheadedness and shortness of breath has improved since stopping her Atorvastatin a month ago.  She received a cortisone shot in her left Hip last week and she is feeling a lot better from that as well. She denies any symptoms at this time. She also confirmed that she stopped Plavix as well.  OK to discontinue atorvastatin or what do you recommend?

## 2021-06-01 NOTE — PROGRESS NOTES
Office Visit - Follow Up   Jaclyn Gibbs   87 y.o. female    Date of Visit: 9/23/2019    Chief Complaint   Patient presents with     Follow-up     4 mo f/u        Assessment and Plan   1. Left lumbar radiculopathy, L5 and S1  Gets epidural steroid injection to her lower back for her chronic low back pains with lumbar radiculopathy  between L5 and S1.  Got her shot on 9/13/2019 [facility] with Dr. Torres.  Gets her cortisone injection every 3 months.    2. NIELSON (dyspnea on exertion)  Still experiences dyspnea on exertion.  Followed by cardiology, Dr. Cardona.  Had heart echo which showed only mild reduction of LV function at 55 but otherwise rest of her echo results were normal.  She will continue to follow with cardiology.  Sees Dr. Cardona every 6 months.    3. Coronary atherosclerosis due to lipid rich plaque  Suspect her shortness of breath is due to small vessel coronary artery disease.  Does not have latasha obstruction or stenosis of her coronaries.    4. Essential hypertension with goal blood pressure less than 140/90  Controlled.  Continue amlodipine, indapamide and losartan.  Check CBC and BMP.  - HM2(CBC w/o Differential)  - Basic Metabolic Panel    5. Hypercholesteremia  Controlled without need for medication.  Last lipids were good, LDL  65, HDL 72, triglycerides 66 and total class I 50.  Fasting today.  Check lipids and liver function.  - Lipid Cascade  - Hepatic Profile    6. Osteopenia  Has osteopenia.  Takes calcium and vitamin D.  Check vitamin D.  - Vitamin D, Total (25-Hydroxy)    7. Flu vaccine need  Flu shot was given.  - Influenza High Dose,Seasonal,PF 65+ Yrs    8. History of pulmonary embolism  Has history of recurrent pulmonary embolism.  On chronic warfarin now.  Check INR.  - INR      Follow up in 4 months.  Female is here for follow-up.     History of Present Illness   This 87 y.o. old has chronic back pain due to spondylolisthesis between L5 and S1 causing lumbar  radiculopathy.  Followed by [facility] care clinic, Dr. Torres.  Gets cortisone injection to her lower back every 3 months.  Got recent injection on 2019 and got good relief of her back pains.  Remains pain free for at least 3 months and then will have a flare of her back pains. Sees Dr. Torres regularly.  Has hypertension and hyperlipemia controlled  by her medications.  Still experiences dyspnea on exertion.  Followed by cardiology.  Had heart echo which was essentially normal.  Sees Dr. Cardona every 6 months.  Takes chronic warfarin for history of DVT.  Had recurrent DVT in the past.  Overall, she feels well.  No other complaints.    Review of Systems   A 12 point comprehensive review of systems was negative except as noted..     Medications, Allergies and Problem List   Reviewed and updated             Chief Complaint   Follow-up (4 mo f/u)       Patient Profile   Social History     Patient does not qualify to have social determinant information on file (likely too young).   Social History Narrative    , 6 children, active, is a full code.  (last updated 2017)         Past Medical History   Patient Active Problem List   Diagnosis     Obesity     Osteopenia     Essential hypertension with goal blood pressure less than 140/90     Coronary Artery Disease     Hypercholesteremia     NIELSON (dyspnea on exertion)     GERD (gastroesophageal reflux disease)     Left lumbar radiculopathy     OAB (overactive bladder)     Anemia       Past Surgical History  She has a past surgical history that includes Appendectomy;  section (); pr ablate heart dysrhythm focus (2014); Cardiac catheterization; Coronary angioplasty (2010); Cataract extraction (Bilateral); Excision basal cell carcinoma; Lumbar laminectomy (); Total knee arthroplasty (Right, 2003); Total knee arthroplasty (Left, 2008); Coronary Angiogram (N/A, 2018); and Left Heart Catheterization Without Left  Ventriculogram (Left, 8/23/2018).       Medications and Allergies   Current Outpatient Medications   Medication Sig     amLODIPine (NORVASC) 5 MG tablet TAKE 1 TABLET (5 MG TOTAL) BY MOUTH DAILY.     CALCIUM CARB/MAGNESIUM OXID/D3 (CALCIUM MAGNESIUM + D ORAL) Take by mouth 2 (two) times a day. 750/300/500mg     ferrous sulfate 325 (65 FE) MG tablet Take 1 tablet (325 mg total) by mouth 3 (three) times a day with meals.     GLUCOSAMINE/CHONDROITIN SULF A (GLUCOSAMINE-CHONDROITIN ORAL) Take 2 tablets by mouth daily.     indapamide (LOZOL) 2.5 MG tablet TAKE 1 TABLET EVERY DAY     losartan (COZAAR) 50 MG tablet Take 1 tablet (50 mg total) by mouth daily.     multivitamin (ONE A DAY) per tablet Take 1 tablet by mouth daily.     nitroglycerin (NITROSTAT) 0.4 MG SL tablet Place 1 tablet (0.4 mg total) under the tongue as needed for chest pain (Keep in original bottle).     oxybutynin (DITROPAN XL) 5 MG ER tablet Take 1 tablet (5 mg total) by mouth daily.     potassium chloride (K-DUR,KLOR-CON) 10 MEQ tablet Take 1 tablet (10 mEq total) by mouth daily.     warfarin (COUMADIN/JANTOVEN) 5 MG tablet Take 1 or 1 1/2 tabs daily Adjust dose based on INR results as directed..     Allergies   Allergen Reactions     Oxycodone Nausea And Vomiting     Codeine Nausea And Vomiting     Diazepam Nausea Only     Morphine Nausea And Vomiting        Family and Social History   Family History   Problem Relation Age of Onset     Stroke Mother 88     Hypertension Father      Sudden death Father 70        suspected heart attack      Breast cancer Sister      Venous thrombosis Sister 80        At age 80, and had breast CA     Cirrhosis Sister 34     Aneurysm Sister      Other Brother 25        Killed in WWII.     Brain cancer Daughter 5     No Medical Problems Daughter      No Medical Problems Son      No Medical Problems Son      No Medical Problems Son      Dementia Sister         Social History     Tobacco Use     Smoking status: Never Smoker  "    Smokeless tobacco: Never Used   Substance Use Topics     Alcohol use: No     Drug use: No        Physical Exam       Physical Exam  /78   Pulse 73   Ht 5' 3\" (1.6 m)   Wt 159 lb (72.1 kg)   SpO2 98% Comment: RA  BMI 28.17 kg/m    General appearance: alert, appears stated age, cooperative and no distress  Head: Normocephalic, without obvious abnormality, atraumatic  Throat: lips, mucosa, and tongue normal; teeth and gums normal  Neck: no adenopathy, no carotid bruit, no JVD, supple, symmetrical, trachea midline and thyroid not enlarged, symmetric, no tenderness/mass/nodules  Back: symmetric, no curvature. ROM normal. No CVA tenderness.  Lungs: clear to auscultation bilaterally  Heart: regular rate and rhythm, S1, S2 normal, no murmur, click, rub or gallop  Abdomen: soft, non-tender; bowel sounds normal; no masses,  no organomegaly  Extremities: extremities normal, atraumatic, no cyanosis or edema  Skin: Skin color, texture, turgor normal. No rashes or lesions  Neurologic: Grossly normal  Musculoskeletal: Normal back exam     Additional Information        Pancho Banks MD  Internal Medicine  Contact me at [phone]     Additional Information   Current Outpatient Medications   Medication Sig     amLODIPine (NORVASC) 5 MG tablet TAKE 1 TABLET (5 MG TOTAL) BY MOUTH DAILY.     CALCIUM CARB/MAGNESIUM OXID/D3 (CALCIUM MAGNESIUM + D ORAL) Take by mouth 2 (two) times a day. 750/300/500mg     ferrous sulfate 325 (65 FE) MG tablet Take 1 tablet (325 mg total) by mouth 3 (three) times a day with meals.     GLUCOSAMINE/CHONDROITIN SULF A (GLUCOSAMINE-CHONDROITIN ORAL) Take 2 tablets by mouth daily.     indapamide (LOZOL) 2.5 MG tablet TAKE 1 TABLET EVERY DAY     losartan (COZAAR) 50 MG tablet Take 1 tablet (50 mg total) by mouth daily.     multivitamin (ONE A DAY) per tablet Take 1 tablet by mouth daily.     nitroglycerin (NITROSTAT) 0.4 MG SL tablet Place 1 tablet (0.4 mg total) under the tongue as needed " for chest pain (Keep in original bottle).     oxybutynin (DITROPAN XL) 5 MG ER tablet Take 1 tablet (5 mg total) by mouth daily.     potassium chloride (K-DUR,KLOR-CON) 10 MEQ tablet Take 1 tablet (10 mEq total) by mouth daily.     warfarin (COUMADIN/JANTOVEN) 5 MG tablet Take 1 or 1 1/2 tabs daily Adjust dose based on INR results as directed..     Allergies   Allergen Reactions     Oxycodone Nausea And Vomiting     Codeine Nausea And Vomiting     Diazepam Nausea Only     Morphine Nausea And Vomiting     Social History     Tobacco Use     Smoking status: Never Smoker     Smokeless tobacco: Never Used   Substance Use Topics     Alcohol use: No     Drug use: No         Time: total time spent with the patient was 25 minutes of which >50% was spent in counseling and coordination of care

## 2021-06-02 ENCOUNTER — RECORDS - HEALTHEAST (OUTPATIENT)
Dept: ADMINISTRATIVE | Facility: CLINIC | Age: 86
End: 2021-06-02

## 2021-06-02 VITALS — WEIGHT: 170 LBS | BODY MASS INDEX: 30.12 KG/M2 | HEIGHT: 63 IN

## 2021-06-02 VITALS — HEIGHT: 63 IN | BODY MASS INDEX: 29.77 KG/M2 | WEIGHT: 168 LBS

## 2021-06-02 VITALS — WEIGHT: 163 LBS | HEIGHT: 63 IN | BODY MASS INDEX: 28.88 KG/M2

## 2021-06-02 VITALS — HEIGHT: 63 IN | WEIGHT: 164 LBS | BODY MASS INDEX: 29.06 KG/M2

## 2021-06-02 VITALS — BODY MASS INDEX: 30.47 KG/M2 | WEIGHT: 172 LBS

## 2021-06-02 VITALS — WEIGHT: 169 LBS | BODY MASS INDEX: 29.94 KG/M2

## 2021-06-02 VITALS — WEIGHT: 169.4 LBS | BODY MASS INDEX: 30.01 KG/M2

## 2021-06-02 VITALS — BODY MASS INDEX: 29.95 KG/M2 | HEIGHT: 63 IN | WEIGHT: 169 LBS

## 2021-06-02 NOTE — TELEPHONE ENCOUNTER
ANTICOAGULATION  MANAGEMENT    Assessment     Today's INR result of 1.60 is Subtherapeutic (goal INR of 2.0 - 2.5)        Warfarin taken as previously instructed    No new diet changes affecting INR    No new medication/supplements affecting INR    Continues to tolerate warfarin with no reported s/s of bleeding or thromboembolism     Previous INR was Subtherapeutic at 1.80 on 9/23/19.    Returned from vacation on 10/6/19.'    Leaving for vacation again on 10/19 - 26.    Plan:     Spoke with Jaclyn regarding INR result and instructed:     Warfarin Dosing Instructions:  (has 5mg tabs)   - Change warfarin dose to  2.5 mg daily on Thursday; and 5 mg daily rest of week.   - (8.3 % change)    Instructed patient to follow up no later than:  1-2 wks.   - scheduled on 10/17/19 @ [facility].    Education provided: importance of consistent vitamin K intake, target INR goal and significance of current INR result and importance of taking warfarin as instructed    Jaclyn verbalizes understanding and agrees to warfarin dosing plan.    Instructed to call the [facility] for any changes, questions or concerns. (#[phone])   ?   Dory Montenegro RN    Subjective/Objective:      Jaclyn Gibbs, a 87 y.o. female is on warfarin.     Jaclyn reports:     Home warfarin dose: as updated on anticoagulation calendar per template     Missed doses: No     Medication changes:  No     S/S of bleeding or thromboembolism:  No     New Injury or illness:  No     Changes in diet or alcohol consumption:  No     Upcoming surgery, procedure or cardioversion:  No    Anticoagulation Episode Summary     Current INR goal:      TTR:   55.6 %   Next INR check:   10/21/2019   INR from last check:   1.60! (10/7/2019)   Goal at result date:   2.0-3.0   Weekly max warfarin dose:      Target end date:   Indefinite   INR check location:      Preferred lab:      Send INR reminders to:   [facility]    Indications    Other acute pulmonary  embolism without acute cor pulmonale (H) (Resolved) [I26.99]           Comments:   Goal range 2.0 - 2.5 per Dr. Cardona, 12/12/18         Anticoagulation Care Providers     Provider Role Specialty Phone number    Robin Catherine MD Referring Internal Medicine [phone]

## 2021-06-02 NOTE — TELEPHONE ENCOUNTER
Anticoagulation Management    Unable to reach [place] today.    Today's INR result of 3.50 is Supratherapeutic (goal INR of 2.0 - 2.5).    - they have been traveling from 10/19-26.    Follow up required to discuss out of range INR .    No instructions provided. Unable to leave voicemail. (phone just rings and rings).       ACN to follow up - Wed. 10/30/19.    Dory Montenegro RN

## 2021-06-02 NOTE — TELEPHONE ENCOUNTER
ANTICOAGULATION  MANAGEMENT    Assessment     Today's INR result of 3.50 is Supratherapeutic (goal INR of 2.0 - 2.5)        Warfarin taken as previously instructed    No new diet changes affecting INR    No new medication/supplements affecting INR    Continues to tolerate warfarin with no reported s/s of bleeding or thromboembolism     Previous INR was Subtherapeutic at 1.10 on 10/17/19.    Has been traveling from 10/19-26.    Plan:     Spoke with Jaclyn regarding INR result and instructed:     Warfarin Dosing Instructions:    - she took her 5mg warfarin dose last night, 10/29.    - today, advised to HOLD warfarin dose, then tomorrow take 2.5mg warfarin dose,   - therafter, continue current warfarin dose 2.5 mg daily on Wednesdays; and 5 mg daily rest of week.    Instructed patient to follow up no later than:  1-2 wks.   - scheduled on 11/11/19 @ [facility].    Education provided: importance of consistent vitamin K intake, impact of vitamin K foods on INR, target INR goal and significance of current INR result, importance of notifying clinic for changes in medications and importance of notifying clinic for diarrhea, nausea/vomiting, reduced intake and/or illness    Jaclyn verbalizes understanding and agrees to warfarin dosing plan.    Instructed to call the [facility] for any changes, questions or concerns. (#[phone])   ?   Dory Montenegro RN    Subjective/Objective:      Jaclyn Gibbs, a 87 y.o. female is on warfarin.     Jaclyn reports:     Home warfarin dose: verbally confirmed home dose with Jaclyn and updated on anticoagulation calendar     Missed doses: No     Medication changes:  No     S/S of bleeding or thromboembolism:  No     New Injury or illness:  Yes:  Recently returned from vacation and not feeling good (more arthritic pains; back pain).     Changes in diet or alcohol consumption:  No  Eating is not affected and eating good.     Upcoming surgery, procedure or cardioversion:   No    Anticoagulation Episode Summary     Current INR goal:      TTR:   53.5 %   Next INR check:   11/12/2019   INR from last check:   3.50! (10/29/2019)   Goal at result date:   2.0-3.0   Weekly max warfarin dose:      Target end date:   Indefinite   INR check location:      Preferred lab:      Send INR reminders to:   [facility]    Indications    Other acute pulmonary embolism without acute cor pulmonale (H) (Resolved) [I26.99]           Comments:   Goal range 2.0 - 2.5 per Dr. Cardona, 12/12/18         Anticoagulation Care Providers     Provider Role Specialty Phone number    Robin Catherine MD Referring Internal Medicine [phone]

## 2021-06-02 NOTE — TELEPHONE ENCOUNTER
ANTICOAGULATION  MANAGEMENT    Assessment     Today's INR result of 1.10 is Subtherapeutic (goal INR of 2.0 - 2.5)        Less warfarin taken than instructed which may be affecting INR     No new diet changes affecting INR    No new medication/supplements affecting INR    Continues to tolerate warfarin with no reported s/s of bleeding or thromboembolism     Previous INR was Subtherapeutic     Leaving on vacation again from 10/19-26.    Plan:     Spoke with Jaclyn regarding INR result and instructed:    1.  Advised to ensure, she takes correct warfarin doses.   2.  Reported, she does have a wkly medication dispenser.   3.  Despite increased wkly warfarin dosing, INR went sub-therapeutic.    Warfarin Dosing Instructions:   (has 5mg tabs)   - today, advised one time booster with 5mg warfarin dose,   - then change warfarin dose to 2.5 mg daily on Thursdays; and 5 mg daily rest of week.    Instructed patient to follow up no later than:  One wk.  Scheduled on 10/29/19 @ [facility].   - she is leaving for vacation from 10/19 - 10/28.    Education provided: importance of consistent vitamin K intake, impact of vitamin K foods on INR, target INR goal and significance of current INR result, importance of taking warfarin as instructed, Strategies to improve compliance (pillbox, alarm, calendar, etc) and importance of notifying clinic for changes in medications    Jaclyn verbalizes understanding and agrees to warfarin dosing plan.    Instructed to call the [facility] for any changes, questions or concerns. (#[phone])   ?   Dory Montenegro RN    Subjective/Objective:      Jaclyn Gibbs, a 87 y.o. female is on warfarin.     Jaclyn reports:     Home warfarin dose: template incorrect; verbally confirmed home dose with Jaclyn and updated on anticoagulation calendar - REPORTED taking less warfarin dose.     Missed doses: No.  Reported not missing any warfarin doses.     Medication changes:  No.  Reported taking more  ES-Tylenol for her neck pains.     S/S of bleeding or thromboembolism:  No     New Injury or illness:  Yes: continues to complain of neck pains due OA of the cervical spine.     Changes in diet or alcohol consumption:  No.  Denied any significant change in her Vitamin-K intake.     Upcoming surgery, procedure or cardioversion:  No    Anticoagulation Episode Summary     Current INR goal:      TTR:   55.5 %   Next INR check:   10/24/2019   INR from last check:   1.10! (10/17/2019)   Goal at result date:   2.0-3.0   Weekly max warfarin dose:      Target end date:   Indefinite   INR check location:      Preferred lab:      Send INR reminders to:   [facility]    Indications    Other acute pulmonary embolism without acute cor pulmonale (H) (Resolved) [I26.99]           Comments:   Goal range 2.0 - 2.5 per Dr. Cardona, 12/12/18         Anticoagulation Care Providers     Provider Role Specialty Phone number    Robin Catherine MD Referring Internal Medicine [phone]

## 2021-06-03 VITALS
BODY MASS INDEX: 28.17 KG/M2 | WEIGHT: 159 LBS | OXYGEN SATURATION: 98 % | DIASTOLIC BLOOD PRESSURE: 78 MMHG | SYSTOLIC BLOOD PRESSURE: 132 MMHG | HEART RATE: 73 BPM | HEIGHT: 63 IN

## 2021-06-03 VITALS — HEIGHT: 63 IN | BODY MASS INDEX: 28.17 KG/M2 | WEIGHT: 159 LBS

## 2021-06-03 VITALS — BODY MASS INDEX: 29.06 KG/M2 | HEIGHT: 63 IN | WEIGHT: 164 LBS

## 2021-06-03 VITALS — WEIGHT: 160 LBS | BODY MASS INDEX: 28.35 KG/M2 | HEIGHT: 63 IN

## 2021-06-03 VITALS — BODY MASS INDEX: 27.11 KG/M2 | WEIGHT: 153 LBS | HEIGHT: 63 IN

## 2021-06-03 VITALS — BODY MASS INDEX: 28.7 KG/M2 | HEIGHT: 63 IN | WEIGHT: 162 LBS

## 2021-06-03 VITALS — HEIGHT: 63 IN | BODY MASS INDEX: 28.88 KG/M2 | WEIGHT: 163 LBS

## 2021-06-03 NOTE — TELEPHONE ENCOUNTER
ANTICOAGULATION  MANAGEMENT    Assessment     Today's INR result of 3.50 is Supratherapeutic (goal INR of 2.0 - 2.5)        Warfarin taken as previously instructed    No new diet changes affecting INR    Potential interaction between taking more ES-Tylenol  and warfarin which may affect subsequent INRs    Continues to tolerate warfarin with no reported s/s of bleeding or thromboembolism     Previous INR was Therapeutic at 2.30 on 11/11/19.    Experiencing back pain for one wk.  Usually gets steroid injections of LEFT LS=S1 TF-LEO of left sacral region.  Scheduled for lumbar/sacral injection on 12/9/19.    Plan:     Spoke with Jaclyn regarding INR result and instructed:     Warfarin Dosing Instructions:   (evenings.  has 5mg tabs)   - today, advised to HOLD warfarin dose tonight,   - then continue current warfarin dose 2.5 mg daily on Wednesday; and 5 mg daily rest of week.    Instructed patient to follow up no later than:  Scheduled on 12/2/19.    Education provided: importance of consistent vitamin K intake, target INR goal and significance of current INR result, potential interaction between warfarin and ES-Tylenol and importance of notifying clinic for changes in medications    Jaclyn verbalizes understanding and agrees to warfarin dosing plan.    Instructed to call the [facility] for any changes, questions or concerns. (#[phone])   ?   Dory Montenegro RN    Subjective/Objective:      Jaclyn Gibbs, a 87 y.o. female is on warfarin.     Jaclyn reports:     Home warfarin dose: as updated on anticoagulation calendar per template     Missed doses: No     Medication changes:  Yes: reported taking ES-Tylenol 500mg 2 tablets morning / evenings.     S/S of bleeding or thromboembolism:  No     New Injury or illness:  Yes:  Back / left sacral region with sciatica pain.  Usually get LEO of lumbar / sacral left area.     Changes in diet or alcohol consumption:  No     Upcoming surgery, procedure or  cardioversion:  Yes: scheduled for lumbar injection on 12/9/19.    Anticoagulation Episode Summary     Current INR goal:      TTR:   51.7 % (1 y)   Next INR check:   12/10/2019   INR from last check:   3.50! (11/26/2019)   Goal at result date:   2.0-3.0   Weekly max warfarin dose:      Target end date:   Indefinite   INR check location:      Preferred lab:      Send INR reminders to:   [facility] [place]    Indications    Other acute pulmonary embolism without acute cor pulmonale (H) (Resolved) [I26.99]           Comments:   Goal range 2.0 - 2.5 per Dr. Cardona, 12/12/18         Anticoagulation Care Providers     Provider Role Specialty Phone number    Robin Catherine MD Referring Internal Medicine [phone]

## 2021-06-03 NOTE — PATIENT INSTRUCTIONS - HE
An order for physical therapy has been provided today.  You can schedule physical therapy before you leave today or someone will call you to schedule physical therapy.  It will be very important for you to do your physical therapy exercises on a regular basis to decrease your pain and prevent future flares of pain.    f you have any questions or concerns prior to your injection, please do not hesitate to call the nurse navigation line at [phone] or contact Dr. Torres through [employer].

## 2021-06-03 NOTE — TELEPHONE ENCOUNTER
Call to pt with provider's response. Pt stated understanding and would like to proceed. Injection requirements reviewed. Pt is on warfarin and is aware her INR will be checked the day of the injection. Transferred her to scheduling to make follow-up appt and injection appt.

## 2021-06-03 NOTE — TELEPHONE ENCOUNTER
Patient has not been seen in 2019.  Patient should schedule a f/u appointment prior to injection (though okay to order injection and schedule injection to start the PA process given [employer] does take 2 weeks to confirm PA).  Okay to follow-up and injection same day but they will not likely be able to be back to back appointments.

## 2021-06-03 NOTE — PROGRESS NOTES
Assessment:   Jaclyn Gibbs is a 87 y.o. y.o. female with past medical history significant for pulmonary embolism (on Coumadin Addendum on 12-2-19 at 13:54:  Patient no longer on Plavix.  End addendum.), coronary artery disease (on Plavix), hyperlipidemia, hypertension, osteopenia who presents today for follow-up regarding chronic left-sided low back pain with left radicular/sciatic type leg pain.  The patient states that she does get significant relief with the left L5-S1 transforaminal epidural steroid injection.  Her last injection was September 13, 2019.  She reports that she gets under percent relief for 2 weeks and then after that she gets about 80% relief with some intermittent pain.  Her MRI does show facet arthropathy at the L5-S1 level which does cause severe foraminal stenosis and lateral recess stenosis at this level.  Patient does have a new complaint of neck pain today.  This may be from cervical facet joint syndrome given her cervical facet arthropathy/ankylosis of the cervical facet joints.  There is likely also a myofascial pain component.  She is neurologically intact and without any red flag symptoms.       Plan:     A shared decision making plan was used.  The patient's values and choices were respected.  The following represents what was discussed and decided upon by the physician and the patient.      1.  DIAGNOSTIC TESTS: The patient's MRI of the lumbar spine from the 31st of 2017 was personally reviewed today by the physician with the physician performing her own interpretation.  Patient does have a severe scoliosis centered at the L2-3 level with a Calderón angle of 36 degrees.  Patient does have significant facet arthropathy seen at the L4-5 and L5-S1 levels.  She has severe left L5-S1 foraminal stenosis with moderate to severe left L4-5 foraminal stenosis and mild right L4-5 foraminal stenosis.  There is moderate central canal stenosis seen at the L3-4 level with severe right and  "moderate left neuroforaminal stenosis.  Patient does have moderate to severe right L2-3 foraminal stenosis.  Please refer to the report for details.  2.  PHYSICAL THERAPY: Patient did physical therapy in June 2019.  She states she was \"not impressed.\"  She is interested in returning to physical therapy with 1 of the spine therapist.  Dr. Torres will ask that the therapist work on her neck in addition to the low back with nerve glides/flossing exercises in addition to some exercises for gait/balance.  Dr. Torres did request that Barbara Vides work with the patient.  3.  MEDICATIONS: Patient has previously declined to retry gabapentin as she did have significant side effects to it previously.  If she ever wants to try it again, gabapentin 100 mg can be prescribed and she can titrate up to a maximum of 300 mg 3 times a day to start.  4.  INTERVENTIONS: Patient is scheduled for a repeat left L5-S1 transforaminal epidural steroid injection on December 9, 2019.  She can continue with these injections every 3 months.  -The patient states she is not interested in transfer for the neck.  It would be better if she can manage the pain with just physical therapy exercises.  However injections can be considered if the pain significantly worsens.  5.  PATIENT EDUCATION: Discussed the diagnosis of the cervical facet joint syndrome/cervical facet arthropathy with patient in addition to myofascial pain.  -All of her questions were answered to her satisfaction today.  She was in agreement with the treatment plan.  6.  FOLLOW-UP: Patient will follow-up next week for the injection.  She will need follow-up once a year (per calendar year) for neurologic exam and history obtaining an order to continue having injections through this clinic.  If there are any questions/concerns or any significant worsening of pain prior to that time, the patient is asked to call the clinic via the nurse navigation line or via [employer].     Subjective: "     Jaclyn Gibbs is a 87 y.o. female who presents today for follow-up regarding chronic left-sided low back pain with left radicular/sciatic type leg pain.  The patient has continued to have good control of pain with left L5-S1 transforaminal epidural steroid injections that are repeated about once every 3 months.  Her last injection was on September 13, 2019.  She reports that she has about 100% relief for 2 weeks and then after that she has about 80% relief.  She does have some episodes of pain off and on, and she states that some days are better than others, but overall she feels that the injections do help keep the pain manageable.  Most of her pain is concentrated in the left lateral lower leg going into the lateral and dorsal aspect of the left foot.  She denies any numbness or tingling or weakness.  She rates her pain today at a 2 out of 10.  Her pain is worse when the injection wears off, but otherwise it does not seem to be consistently worse with specific activities.  She does feel better with relative rest.  She is managing her pain with over-the-counter Tylenol which does help somewhat.  She did do physical therapy in June 2019.  Reports that she was disappointed in the physical therapy, she had different expectations for which she should be doing.  She is continuing to do her home excise program on a regular basis.    Past medical history is reviewed and is unchanged for any new medical diagnoses in the interim.      Family history is reviewed and is unchanged in the interim.      Review of Systems:  Positive for occasional headaches.  Negative for numbness/tingling, weakness, bowel/bladder dysfunction, foot drop, dizziness, nausea/vomiting, blurred vision, balance changes.     Objective:   CONSTITUTIONAL:  Vital signs as above.  No acute distress.  The patient is well nourished and well groomed.    PSYCHIATRIC:  The patient is awake, alert, oriented to person, place and time.  The patient is  answering questions appropriately with clear speech.  Normal affect.  SKIN:  Skin over the face, posterior torso, bilateral upper and lower extremities is clean, dry, intact without rashes.  MUSCULOSKELETAL:  Gait is non-antalgic.  The patient is able to heel and toe walk without any difficulty.  Mild tenderness over the left lower umbar paraspinal muscles.      The patient has 5/5 strength for the bilateral hip flexors, knee flexors/extensors, ankle dorsiflexors/plantar flexors, ankle evertors/invertors.  Negative seated straight leg raising test on the left.  NEUROLOGICAL: Trace patellar, medial hamstring, achilles reflexes which are symmetric bilaterally.  No ankle clonus bilaterally.  Sensation to light touch is intact in the bilateral L4, L5, and S1 dermatomes.

## 2021-06-03 NOTE — TELEPHONE ENCOUNTER
ANTICOAGULATION  MANAGEMENT    Assessment     Today's INR result of 2.30 is Therapeutic (goal INR of 2.0 - 2.5)        Warfarin taken as previously instructed    No new diet changes affecting INR    No new medication/supplements affecting INR    Continues to tolerate warfarin with no reported s/s of bleeding or thromboembolism     Previous INR was Supratherapeutic at 3.50 on 10/30/19.    Plan:     Spoke with Jaclyn regarding INR result and instructed:     Warfarin Dosing Instructions:   (has 5mg tabs)   - Continue current warfarin dose 2.5 mg daily on Wednesday; and 5 mg daily rest of week.    Instructed patient to follow up no later than:  2 wks.   - scheduled on 11/26/19 @ [facility]    Education provided: importance of consistent vitamin K intake, target INR goal and significance of current INR result and importance of notifying clinic for changes in medications    NAN verbalizes understanding and agrees to warfarin dosing plan.    Instructed to call the [facility] for any changes, questions or concerns. (#[phone])   ?   Dory Montenegro RN    Subjective/Objective:      Jaclyn Gibbs, a 87 y.o. female is on warfarin.     Jaclyn reports:     Home warfarin dose: as updated on anticoagulation calendar per template     Missed doses: No     Medication changes:  No     S/S of bleeding or thromboembolism:  No     New Injury or illness:  No     Changes in diet or alcohol consumption:  No     Upcoming surgery, procedure or cardioversion:  No    Anticoagulation Episode Summary     Current INR goal:      TTR:   52.1 %   Next INR check:   11/25/2019   INR from last check:   2.30 (11/11/2019)   Goal at result date:   2.0-3.0   Weekly max warfarin dose:      Target end date:   Indefinite   INR check location:      Preferred lab:      Send INR reminders to:   [facility]    Indications    Other acute pulmonary embolism without acute cor pulmonale (H) (Resolved) [I26.99]           Comments:   Goal range  2.0 - 2.5 per Dr. Cardona, 12/12/18         Anticoagulation Care Providers     Provider Role Specialty Phone number    Robin Catherine MD Referring Internal Medicine [phone]

## 2021-06-03 NOTE — TELEPHONE ENCOUNTER
Dr. Banks,     - as you know Jaclyn has non-standard INR target goal of 2.0 - 2.5     - Our pharmacist, is not available for dosing.     - do you agree with treatment plan as outlined on Jaclyn, till next INR check?

## 2021-06-03 NOTE — TELEPHONE ENCOUNTER
Call from pt to give status update. Pt last seen on 9/13/2019 for Left L5-S1 TFESI. Pt has had several of these injections over the past years. She states she received 100% relief until about 1 week ago when her same exact pain returned.     She would like to get on the schedule for repeat injection as she states she needs to get approval from [employer] and she knows this can take some time.     Upon chart review, last OV appt with Dr. Torres was on 10/2018.     Informed pt that will check with provider and will call her back. Ok to schedule repeat injection and follow-up on same day?

## 2021-06-04 VITALS
SYSTOLIC BLOOD PRESSURE: 126 MMHG | HEIGHT: 63 IN | HEART RATE: 78 BPM | OXYGEN SATURATION: 94 % | DIASTOLIC BLOOD PRESSURE: 76 MMHG | WEIGHT: 160 LBS | BODY MASS INDEX: 28.35 KG/M2

## 2021-06-04 VITALS — BODY MASS INDEX: 28.17 KG/M2 | WEIGHT: 159 LBS

## 2021-06-04 NOTE — TELEPHONE ENCOUNTER
Refill Approved    Rx renewed per Medication Renewal Policy. Medication was last renewed on 1/31/19.    Itzel Donovan, [facility] Triage/Med Refill 12/23/2019     Requested Prescriptions   Pending Prescriptions Disp Refills     amLODIPine (NORVASC) 5 MG tablet 90 tablet 3     Sig: Take 1 tablet (5 mg total) by mouth daily.       Calcium-Channel Blockers Protocol Passed - 12/19/2019  4:09 PM        Passed - PCP or prescribing provider visit in past 12 months or next 3 months     Last office visit with prescriber/PCP: 9/23/2019 Pancho Banks MD OR same dept: 9/23/2019 Pancho Banks MD OR same specialty: 9/23/2019 Pancho Banks MD  Last physical: Visit date not found Last MTM visit: Visit date not found   Next visit within 3 mo: Visit date not found  Next physical within 3 mo: Visit date not found  Prescriber OR PCP: Pancho Banks MD  Last diagnosis associated with med order: 1. Essential hypertension with goal blood pressure less than 140/90  - amLODIPine (NORVASC) 5 MG tablet; Take 1 tablet (5 mg total) by mouth daily.  Dispense: 90 tablet; Refill: 3    If protocol passes may refill for 12 months if within 3 months of last provider visit (or a total of 15 months).             Passed - Blood pressure filed in past 12 months     BP Readings from Last 1 Encounters:   12/09/19 140/84

## 2021-06-04 NOTE — TELEPHONE ENCOUNTER
MD on call    (Dr. Banks's patient)    Jaclyn has non-standard INR goal of 2.0 - 2.5    Do you agree with treatment plan?   - patient purposely stopped her warfarin for 4 days prior to TF-LEO of lumbar spine injection today.  She wanted to ensure she gets her Prednisone injection today.      - advised to resume her warfarin dose tonight.  Advised, for next injection to inform ACN's so we can check INR sooner.  She does not need warfarin hold for TF-LEO of LS.  As long as INR is not greater than 3.00     - scheduled to recheck INR on 12/16/19.

## 2021-06-04 NOTE — PATIENT INSTRUCTIONS - HE
DISCHARGE INSTRUCTIONS    During office hours (8:00 a.m.- 4:00 p.m.) questions or concerns may be answered  by calling [facility] Navigation Nurses at  [phone].  Messages received after hours will be returned the following business day.      In the case of an emergency, please dial 911 or seek assistance at the nearest Emergency Room/Urgent Care facility.     All Patients:    ? You may experience an increase in your symptoms for the first 2 days (It may take anywhere between 2 days- 2 weeks for the steroid to have maximum effect).    ? You may use ice on the injection site, as frequently as 20 minutes each hour if needed.    ? You may take your pain medicine.    ? You may continue taking your regular medication after your injection. If you have had a Medial Branch Block you may resume pain medication once your pain diary is completed.    ? You may shower. No swimming, tub bath or hot tub for 48 hours.  You may remove your bandaid/bandage as soon as you are home.    ? You may resume light activities, as tolerated.    ? Resume your usual diet as tolerated.    ? It is strongly advised that you do not drive for 1-3 hours post injection.    ? If you have had oral sedation:  Do not drive for 8 hours post injection.      ? If you have had IV sedation:  Do not drive for 24 hours post injection.  Do not operate hazardous machinery or make important personal/business decisions for 24 hours.      POSSIBLE STEROID SIDE EFFECTS (If steroid/cortisone was used for your procedure)    -If you experience these symptoms, it should only last for a short period      Swelling of the legs                Skin redness (flushing)       Mouth (oral) irritation     Blood sugar (glucose) levels              Sweats                      Mood changes    Headache    Sleeplessness         POSSIBLE PROCEDURE SIDE EFFECTS  -Call the Sp[facility] Center if you are concerned    Increased Pain             Increased numbness/tingling         Nausea/Vomiting            Bruising/bleeding at site        Redness or swelling                                                Difficulty walking        Weakness             Fever greater than 100.5    *In the event of a severe headache after an epidural steroid injection that is relieved by lying down, please call the [facility] to speak with a clinical staff member*

## 2021-06-04 NOTE — PROGRESS NOTES
[facility] Certification Request    January 6, 2020      Patient: Jaclyn Gibbs  MR Number: [id number]  YOB: 1932  Date of Visit: 1/6/2020      Dear Dr. Torres    Thank you for this referral.   We are seeing Jaclyn Gibbs for Physical Therapy of neck/back pain.    Medicare and/or Medicaid requires physician review and approval of the treatment plan. Please review the plan of care and verify that you agree with the therapy plan of care by co-signing this note.      Plan of Care  Authorization / Certification Start Date: 01/06/20  Authorization / Certification End Date: 03/23/20  Authorization / Certification Number of Visits: 6  Communication with: Referral Source  Patient Related Instruction: Nature of Condition;Treatment plan and rationale;Self Care instruction;Basis of treatment;Body mechanics;Posture  Times per Week: 1-2  Number of Weeks: 8  Number of Visits: 6  Discharge Planning: to self care/HEP  Precautions / Restrictions : extensive PMH  Therapeutic Exercise: ROM;Stretching;Strengthening  Neuromuscular Reeducation: posture;core;balance/proprioception  Manual Therapy: soft tissue mobilization;myofascial release  Gait Training: as needed      Goals:  Pt. will demonstrate/verbalize independence in self-management of condition in : 12 weeks  Pt. will be independent with home exercise program in : 12 weeks    Pt will: improve walking tolerance for 2 minute walk test in 12 weeks  Pt will: improve neck pain in order to walk with less pain in 12 weeks        If you have any questions or concerns, please don't hesitate to call.    Sincerely,      Ilana Vides, PT        Physician recommendation:     _x__ Follow therapist's recommendation        ___ Modify therapy      *Physician co-signature indicates they certify the need for these services furnished within this plan and while under their care.      [facility]  Neck/Back Initial  Evaluation  Patient Name: Jaclyn Gibbs  Date of evaluation: 1/6/2020  Today's Date: 1/6/2020  Visit Number: 1 of 6 (per mde cert and order through 3-)  Referring provider: Dr. 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 Lumbar foraminal stenosis M48.061    3. Stenosis of lateral recess of lumbar spine M48.061    4. Neck pain M54.2    5. Facet arthropathy, cervical M47.812    6. Myofascial pain M79.18        Assessment:      Jaclyn is a 87 y.o. female who presents to physical therapy today with complaints of neck, back pain and leg weakness that began years ago, with recent visit to referring provider on 12/2/2019 with referral to PT. Clinical exam today reveals decreased endurance, decreased ROM, no significant red flag concerns. Patient may benefit from further PT in order to improve function, though she didn't feel much improvement with previous PT intervention, so this time around may not as well. She was open to doing the 6 ordered sessions and see how she feels about further PT after that.     Patient's expectations/goals are: Realistic  Barriers to Learning or Achieving Goals: previous medical history    Goals:  Pt. will demonstrate/verbalize independence in self-management of condition in : 12 weeks  Pt. will be independent with home exercise program in : 12 weeks    Pt will: improve walking tolerance for 2 minute walk test in 12 weeks  Pt will: improve neck pain in order to walk with less pain in 12 weeks         Plan:        Plan for next visit: start manual therapy on left UT for pain, work  on exercises for LE strengthening, add to HEP    Plan of Care:   Authorization / Certification Start Date: 01/06/20  Authorization / Certification End Date: 03/23/20  Authorization / Certification Number of Visits: 6  Communication with: Referral Source  Patient Related Instruction: Nature of Condition;Treatment plan and rationale;Self Care instruction;Basis of treatment;Body mechanics;Posture  Times per Week: 1-2  Number of Weeks: 8  Number of Visits: 6  Discharge Planning: to self care/HEP  Precautions / Restrictions : extensive PMH  Therapeutic Exercise: ROM;Stretching;Strengthening  Neuromuscular Reeducation: posture;core;balance/proprioception  Manual Therapy: soft tissue mobilization;myofascial release  Gait Training: as needed    Patient is in agreement with PT plan of care and goals.        Subjective:         History Of Present Illness:  Jaclyn is a 87 y.o. female who presents to physical therapy today with complaints of left sided neck pain that has gotten really bad in the past year. Feels like it doesn't change much. Patient sleeps on left side, she wakes up in the morning and has no pain. Then, after 30 minutes her pain sets in and worsens as the day goes on.     Current pain level in the neck: 8/10 Tylenol helps somewhat.     Patient states she has chronic back pain that worsens when she stands/walks. Her legs feel weak. She doesn't feel comfortable with her balance.  States her walking in from the parking lot is even challenging.  She states she uses a cane at times and it helps when she does use it, but will take it with her when she goes shopping.   She gets injections in her lower back every 3 months or so.     Patient expected that she would be on a bike or do some equipment to work on strengthening the last time she went to PT. She states she doesn't get much exercise during the day.     Social information: lives in a condo with spouse.    Past Medical History:  Patient Active Problem List    Diagnosis     Obesity     Osteopenia     Essential hypertension with goal blood pressure less than 140/90     Coronary Artery Disease     Hypercholesteremia     NIELSON (dyspnea on exertion)     GERD (gastroesophageal reflux disease)     Left lumbar radiculopathy     OAB (overactive bladder)     Anemia        Severity:  Pain Rating Today:8/10  Pain rating at best: 2/10  Pain rating at worst: 10/10  Quality/Description: ache     Associated symptoms:                         Numbness: no                        Weakness: feels like her legs are weak                        Bladder or Bowel loss of control: no                        Fever and/or Chills: no     Functional limitations:   Walking, strength in her legs, turning neck with neck pain     Objective:      Note: Items left blank indicates the item was not performed or not indicated at the time of the evaluation.    Lumbo-Pelvic/Cervico-Thoracic Examination  1. Chronic left-sided low back pain with left-sided sciatica     2. Lumbar foraminal stenosis     3. Stenosis of lateral recess of lumbar spine     4. Neck pain     5. Facet arthropathy, cervical     6. Myofascial pain          Precautions/Restrictions: Falls risk    Posture Observation: notable scoliosis, forward head posture, rounded forward shoulders.    Lumbar ROM:    Flexion (finger to floor): fingers to floor with knees bent  Extension: patient gets just beyond neutral   Sidebending: NT today    Cervical ROM:   Flexion: full ROM  Extension: major limit, gets just beyond neutral and sub occipital nods  Sidebending: pain to the left  Rotation: pain to the left    Lower Extremity Strength: (/5)   Hip flexion (L1-3) : 4 bilat  Knee extension (L3-4): 4 bilat  Knee flexion: 4 bilat  Ankle DF (L4-5): 5 bilat  Can heel/toe walk NT     Palpation: pain to left UT    Patient Outcome Measures: Modified Oswestry Low Back Pain Disablity Questionnaire  in %: 28     Scores range from 0-100%, where a score of 0% represents  minimal pain and maximal function. The minimal clinically important difference is a score reduction of 12%.     2 min walk test: 4 lengths, felt 5/10 on OMNI scale. Neck pain increased.    Sit<>Stand: 8 in 30 sec    Exercises:  Exercise #1: sit<>Stand  Comment #1: 8 reps without UE assist in 30 sec.  Exercise #2: neck rotation   Comment #2: 5x slowly both directions  Exercise #3: nustep   Comment #3: level 4, 5 minutes  Exercise #4: leg press  Comment #4: level 18, 10x         Treatment Today  TREATMENT MINUTES COMMENTS   Evaluation 25    Self-care/ Home management 15 Ed for lifestyle changes: Don't sit longer than 30 min when on the computer. Watch posture, pay attention to keeping head back. Trial going to [facility] for walking/nustep 3 times per week. Discussed that using a walker would e a good idea to help with increasing distance that she can walk.   Patient should start using her cane at all times at least for falls reduction   Manual therapy     Neuromuscular Re-education     Therapeutic Activity     Therapeutic Exercises 15 See above.   Gait training     Modality__________________                Total 55    Blank areas are intentional and mean the treatment did not include these items.     PT Evaluation Code: (Please list factors)  Patient History/Comorbidities:   Patient Active Problem List   Diagnosis     Obesity     Osteopenia     Essential hypertension with goal blood pressure less than 140/90     Coronary Artery Disease     Hypercholesteremia     NIELSON (dyspnea on exertion)     GERD (gastroesophageal reflux disease)     Left lumbar radiculopathy     OAB (overactive bladder)     Anemia   Examination: neck, back pain, balance issues  Clinical Presentation: stable  Clinical Decision Making: low    Patient History/  Comorbidities Examination  (body structures and functions, activity limitations, and/or participation restrictions) Clinical Presentation Clinical Decision Making (Complexity)   No documented  Comorbidities or personal factors 1-2 Elements Stable and/or uncomplicated Low   1-2 documented comorbidities or personal factor 3 Elements Evolving clinical presentation with changing characteristics Moderate   3-4 documented comorbidities or personal factors 4 or more Unstable and unpredictable High                Ilana Vides, PT, CLT  1/6/2020

## 2021-06-04 NOTE — TELEPHONE ENCOUNTER
ANTICOAGULATION  MANAGEMENT PROGRAM    Jaclyn Gibbs is overdue for INR check.     Left message to call and schedule INR appointment as soon as possible.      Ekaterina Torres RN

## 2021-06-04 NOTE — TELEPHONE ENCOUNTER
"Phone call to patient in follow up 2 weeks after Left L5-S1 TFESI. \"I'm great. The shot worked 100%. Congratulate her on another job well done.\" Encouraged pt to call nurse navigation line if questions or concerns arise or her pain returns. Stated understanding.   "

## 2021-06-04 NOTE — TELEPHONE ENCOUNTER
ANTICOAGULATION  MANAGEMENT    Assessment     Today's INR result of 1.20 is Subtherapeutic (goal INR of 2.0-3.0)        Missed dose(s) may be affecting INR    - patient self-dosed and held her warfarin doses, for upcoming LEO of LS.    No new diet changes affecting INR    Potential interaction between today, Methyprednisone injection of LS and also reported cutting down on her Tylenol use. and warfarin which may affect subsequent INRs    Continues to tolerate warfarin with no reported s/s of bleeding or thromboembolism     Previous INR was Subtherapeutic at 1.90 on 11/26/19.    [facility]    Plan:     Spoke with Jaclyn regarding INR result and instructed:     Warfarin Dosing Instructions:    - Continue current warfarin dose 2.5 mg daily on Wednesdays; and 5 mg daily rest of week.    Instructed patient to follow up no later than:  One wk.   - scheduled on 12/16/19 @ [facility].    Education provided: target INR goal and significance of current INR result and importance of taking warfarin as instructed    Jaclyn verbalizes understanding and agrees to warfarin dosing plan.    Instructed to call the [facility] for any changes, questions or concerns. (#[phone])   ?   Dory Montenegro RN    Subjective/Objective:      Jaclyn Phillipsyumiko, a 87 y.o. female is on warfarin.     Jaclyn reports:     Home warfarin dose: verbally confirmed home dose with Jaclyn and updated on anticoagulation calendar     Missed doses: Yes: Jaclyn reported not taking her warfarin, because she wanted to prednisone injection of her lumbar sacral today.  If INR is high, they wont do injection.     Medication changes:  Yes: 12/9/19 TF-ES injection of lumbosacral joint with Methylprednisone every 3 months.   - reported cutting down her Tylenol use to just 2 tabs daily, PRN.     S/S of bleeding or thromboembolism:  No     New Injury or illness:  Yes: chronic low back pain.     Changes in diet or alcohol  consumption:  No     Upcoming surgery, procedure or cardioversion:  No    Anticoagulation Episode Summary     Current INR goal:      TTR:   52.1 % (1 y)   Next INR check:   12/16/2019   INR from last check:   1.20! (12/9/2019)   Goal at result date:   2.0-3.0   Weekly max warfarin dose:      Target end date:   Indefinite   INR check location:      Preferred lab:      Send INR reminders to:   [facility]    Indications    Other acute pulmonary embolism without acute cor pulmonale (H) (Resolved) [I26.99]           Comments:   Goal range 2.0 - 2.5 per Dr. Cardona, 12/12/18         Anticoagulation Care Providers     Provider Role Specialty Phone number    Robin Catherine MD Referring Internal Medicine [phone]

## 2021-06-04 NOTE — TELEPHONE ENCOUNTER
ANTICOAGULATION  MANAGEMENT    Assessment     Today's INR result of 1.90 is Subtherapeutic (goal INR of 2.0 - 2.5)        Warfarin taken differently than instructed, but no impact to total weekly dose    No new diet changes affecting INR    Potential interaction between steroid injection of lumbar spine on 12/9 and warfarin which may affect subsequent INRs    Continues to tolerate warfarin with no reported s/s of bleeding or thromboembolism     Previous INR was Subtherapeutic at 1.20 on 12/9/19.    Consulted Sandie Galaviz, PharmD due to non-standard INR goal.    Plan:     Spoke with Jaclyn regarding INR result and instructed:     Warfarin Dosing Instructions:   (has 5mg tabs)   - Continue current warfarin dose 2.5 mg daily on Wednesdays; and 5 mg daily rest of week.    Instructed patient to follow up no later than:  One wk.   - scheduled on 12/26/19 @ [facility].    Education provided: importance of consistent vitamin K intake, target INR goal and significance of current INR result, potential interaction between warfarin and Steroid injection and importance of notifying clinic for changes in medications    Jaclyn verbalizes understanding and agrees to warfarin dosing plan.    Instructed to call the [facility] for any changes, questions or concerns. (#[phone])   ?   Dory Montenegro RN    Subjective/Objective:      Jaclyn Gibbs, a 87 y.o. female is on warfarin.     Jaclyn reports:     Home warfarin dose: template incorrect; verbally confirmed home dose with Jaclyn and updated on anticoagulation calendar - reported taking 2.5mg wararin tablets on Thursdays - will update anticoag. Tracking.     Missed doses: Yes:  Jaclyn missed several warfarin doses, she wanted to ensure her back injection does not get cancelled.   (advised next time, to inform [facility] and we can check INR status prior to LEO).     Medication changes:  Yes: on 12/9/19 post TF-LEO of lumbar spine (unilateral)     S/S of bleeding or  thromboembolism:  No     New Injury or illness:  No     Changes in diet or alcohol consumption:  No     Upcoming surgery, procedure or cardioversion:  No    Anticoagulation Episode Summary     Current INR goal:      TTR:   52.1 % (1 y)   Next INR check:   12/30/2019   INR from last check:   1.90! (12/16/2019)   Goal at result date:   2.0-3.0   Weekly max warfarin dose:      Target end date:   Indefinite   INR check location:      Preferred lab:      Send INR reminders to:   [facility]    Indications    Other acute pulmonary embolism without acute cor pulmonale (H) (Resolved) [I26.99]           Comments:   Goal range 2.0 - 2.5 per Dr. Cardona, 12/12/18         Anticoagulation Care Providers     Provider Role Specialty Phone number    Robin Catherine MD Referring Internal Medicine [phone]

## 2021-06-05 VITALS — BODY MASS INDEX: 26.39 KG/M2 | WEIGHT: 149 LBS

## 2021-06-05 VITALS
DIASTOLIC BLOOD PRESSURE: 80 MMHG | SYSTOLIC BLOOD PRESSURE: 136 MMHG | RESPIRATION RATE: 18 BRPM | OXYGEN SATURATION: 96 % | TEMPERATURE: 98.7 F | HEART RATE: 64 BPM | WEIGHT: 160 LBS | BODY MASS INDEX: 26.63 KG/M2

## 2021-06-05 VITALS — WEIGHT: 159.2 LBS | BODY MASS INDEX: 28.21 KG/M2 | HEIGHT: 63 IN

## 2021-06-05 VITALS
HEIGHT: 65 IN | WEIGHT: 145 LBS | BODY MASS INDEX: 24.16 KG/M2 | SYSTOLIC BLOOD PRESSURE: 150 MMHG | DIASTOLIC BLOOD PRESSURE: 92 MMHG

## 2021-06-05 VITALS
SYSTOLIC BLOOD PRESSURE: 138 MMHG | HEART RATE: 96 BPM | BODY MASS INDEX: 23.95 KG/M2 | OXYGEN SATURATION: 98 % | DIASTOLIC BLOOD PRESSURE: 86 MMHG | WEIGHT: 143.9 LBS | TEMPERATURE: 97.7 F

## 2021-06-05 VITALS — HEIGHT: 65 IN | WEIGHT: 145 LBS | BODY MASS INDEX: 24.16 KG/M2

## 2021-06-05 NOTE — PROGRESS NOTES
[facility] Rehabilitation Daily Progress Note  Patient Name: Jaclyn Gibbs  Date of evaluation: 1/6/2020  Today's Date: 1/23/2020  Visit Number: 2 of 6 (per mde cert and order through 3-)  Referring provider: Dr. 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 Lumbar foraminal stenosis M48.061    3. Stenosis of lateral recess of lumbar spine M48.061    4. Facet arthropathy, cervical M47.812    5. Neck pain M54.2    6. Myofascial pain M79.18    7. Unsteadiness on feet R26.81        Assessment:       Patient comes in for first follow up for PT today. It took >2 weeks to get further authorization from insurance, so we have had a break in care.     Patient did well with exercises today.     Patient's expectations/goals are: Realistic  Barriers to Learning or Achieving Goals: previous medical history    Goals:  Pt. will demonstrate/verbalize independence in self-management of condition in : 12 weeks  Pt. will be independent with home exercise program in : 12 weeks    Pt will: improve walking tolerance for 2 minute walk test in 12 weeks  Pt will: improve neck pain in order to walk with less pain in 12 weeks         Plan:        Plan for next visit: continue with manual therapy on left UT for pain, work on exercises for LE strengthening, add to HEP    Continue per POC  Patient is in agreement with PT plan of care and goals.        Subjective:       Patient has intense pain in left neck/Upper trap.   Feels best when sleeping and in the morning for about 30-60 minutes.   Takes Tylenol periodically.  She has  good days and bad days.     Pain level: 6/10     Functional limitations:   Walking, strength in her legs, turning neck with neck pain     Objective:       Still limited neck rotation, pain to the left.    From eval:  2 min walk test: 4 lengths, felt 5/10 on OMNI scale. Neck pain increased.    Sit<>Stand: 8 in 30 sec    Exercises: see flowhseet for date performed in clinic  Exercise #1: sit<>Stand  Comment #1: 8 reps without UE assist in 30 sec.  Exercise #2: neck rotation   Comment #2: 5x slowly both directions  Exercise #3: nustep   Comment #3: level 5, 5 minutes  Exercise #4: leg press  Comment #4: level 18, 10x double leg, level 10 single leg 10x bilat  Exercise #5: supine pec stretch hold 2 minutes   Comment #5: HEP  Exercise #6: supine Lower trunk rotation   Comment #6: 10x HEP  Exercise #7: supine single knee to chest  Comment #7: 3x 30 sec HEP  Exercise #8: seated shoulder rolls  Comment #8: 5 times every hour HEP         Treatment Today  TREATMENT MINUTES COMMENTS   Evaluation     Self-care/ Home management  Ed for lifestyle changes: Don't sit longer than 30 min when on the computer. Watch posture, pay attention to keeping head back. Trial going to [facility] for walking/nustep 3 times per week. Discussed that using a walker would e a good idea to help with increasing distance that she can walk.   Patient should start using her cane at all times at least for falls reduction   Manual therapy 30 Patient supine: sub occipital release, left UT, scalenes MFR. Manual stretching into rotation.   Neuromuscular Re-education     Therapeutic Activity     Therapeutic Exercises 25 See above.   Gait training     Modality__________________                Total 55    Blank areas are intentional and mean the treatment did not include these items.          Ilana Vides, PT, CLT  1/23/2020

## 2021-06-05 NOTE — TELEPHONE ENCOUNTER
ANTICOAGULATION  MANAGEMENT    Assessment     Today's INR result of 1.70 is Subtherapeutic (goal INR of 2.0 - 2.5)        Weekly warfarin dosing instructions were written incorrectly on template.   - she verbalized back this more warfarin dose, than instructed since last INR.    No new diet changes affecting INR    No new medication/supplements affecting INR    Continues to tolerate warfarin with no reported s/s of bleeding or thromboembolism     Previous INR was Supratherapeutic at 3.30 on 1/14/2020.    Consult pharmacist, Sandie Galaviz, PharmD    Plan:     Spoke with Jaclyn regarding INR result and instructed:    1.  Advised to throw away all past warfarin dosing instructions and stick new one on her pill dispenser with dates to take warfarin dose.   2.  She just renewed 5mg warfarin script.  (on next refill, possibly decrease warfarin tablet size).   3.  Will frequently check INR at this time, to ensure INR stability.    Warfarin Dosing Instructions:   (evenings. has 5mg tabs)   - advised to take 5mg warfarin daily    - till next INR check.    Instructed patient to follow up no later than:  One wk.   - has f/u appt with Dr. Banks on 1/27/2020.    Education provided: target INR goal and significance of current INR result, importance of taking warfarin as instructed and Strategies to improve compliance (pillbox, alarm, calendar, etc)    Jaclyn verbalizes understanding and agrees to warfarin dosing plan.    Instructed to call the [facility] for any changes, questions or concerns. (#[phone])   ?   Dory Montenegro RN    Subjective/Objective:      Jaclyn Gibbs, a 87 y.o. female is on warfarin.     Jaclyn reports:     Home warfarin dose: template incorrect; verbally confirmed home dose with Jaclyn and updated on anticoagulation calendar -      Missed doses: No     Medication changes:  No     S/S of bleeding or thromboembolism:  No     New Injury or illness:  No     Changes in diet or alcohol  consumption:  No     Upcoming surgery, procedure or cardioversion:  No    Anticoagulation Episode Summary     Current INR goal:      TTR:   51.7 % (1 y)   Next INR check:   1/30/2020   INR from last check:   1.70! (1/23/2020)   Goal at result date:   2.0-3.0   Weekly max warfarin dose:      Target end date:   Indefinite   INR check location:      Preferred lab:      Send INR reminders to:   [facility]    Indications    Other acute pulmonary embolism without acute cor pulmonale (H) (Resolved) [I26.99]           Comments:   Goal range 2.0 - 2.5 per Dr. Cardona, 12/12/18         Anticoagulation Care Providers     Provider Role Specialty Phone number    Robin Catherine MD Referring Internal Medicine [phone]

## 2021-06-05 NOTE — PROGRESS NOTES
[facility] Rehabilitation Daily Progress Note  Patient Name: Jaclyn Gibbs  Date of evaluation: 1/6/2020  Today's Date: 2/6/2020  Visit Number: 4 of 6 (per med cert and order through 3-)  Referring provider: Dr. 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 Lumbar foraminal stenosis M48.061    3. Stenosis of lateral recess of lumbar spine M48.061    4. Facet arthropathy, cervical M47.812    5. Neck pain M54.2    6. Myofascial pain M79.18      Assessment:       Patient has been trying all of her exercises.   Her left UT/neck region is stiff and sore. She is feeling like it's not getting any better. Feels like it's getting her down. Would like to discuss with referring provider.     Patient did well with exercises today.    Patient's expectations/goals are: Realistic  Barriers to Learning or Achieving Goals: previous medical history    Goals:  Pt. will demonstrate/verbalize independence in self-management of condition in : 12 weeks  Pt. will be independent with home exercise program in : 12 weeks    Pt will: improve walking tolerance for 2 minute walk test in 12 weeks  Pt will: improve neck pain in order to walk with less pain in 12 weeks         Plan:      Plan for next visit: continue with manual therapy on left UT for pain, work on exercises for LE strengthening, add to HEP as indicated.    Continue per POC  Patient is in agreement with PT plan of care and goals.      Subjective:       The neck hurts her all the time. The manual therapy not too helpful yet that she has noticed. Is doing all of her  "exercises.     Pain level: \"not having much pain today, just feels a little off today, the weather\"     Functional limitations:   Walking, strength in her legs, turning neck with neck pain     Objective:       Still limited neck rotation, pain to the left.    Patient did state today that she had a little easier time turning head following manual therapy today.     Exercises: see flowhseet for date performed in clinic  Exercise #1: sit<>Stand  Comment #1: 10 reps (not timed)  Exercise #2: neck rotation   Comment #2: 5x slowly both directions  Exercise #3: nustep   Comment #3: level 4, 5 minutes  Exercise #4: leg press  Comment #4: level 18, 15x double leg, level 10 single leg 150x bilat  Exercise #5: supine pec stretch hold 2 minutes   Comment #5: HEP  Exercise #6: supine Lower trunk rotation   Comment #6: 10x HEP  Exercise #7: supine single knee to chest  Comment #7: 3x 30 sec HEP  Exercise #8: seated shoulder rolls  Comment #8: 5 times every hour HEP  Exercise #9: standing heel/toe raises  Comment #9: 10x  Exercise #10: seated rows - orange band   Comment #10: 10x HEP addition  Exercise #11: supine neck retraction / seated neck retraction  Comment #11: supine and seated scap retraction        Treatment Today  TREATMENT MINUTES COMMENTS   Evaluation     Self-care/ Home management  Ed for lifestyle changes: Don't sit longer than 30 min when on the computer. Watch posture, pay attention to keeping head back. Trial going to [facility] for walking/nustep 3 times per week. Discussed that using a walker would e a good idea to help with increasing distance that she can walk.   Patient should start using her cane at all times at least for falls reduction   Manual therapy 40 Patient supine: sub occipital release, left UT, scalenes MFR. Manual stretching into rotation.   Neuromuscular Re-education     Therapeutic Activity     Therapeutic Exercises 15 See above.   Gait training     Modality__________________                Total 55  "   Blank areas are intentional and mean the treatment did not include these items.          Ilana Vides, PT, CLT  2/6/2020

## 2021-06-05 NOTE — PROGRESS NOTES
[facility] Rehabilitation Daily Progress Note  Patient Name: Jaclyn Gibbs  Date of evaluation: 1/6/2020  Today's Date: 1/30/2020  Visit Number: 3 of 6 (per mde cert and order through 3-)  Referring provider: Dr. 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 Lumbar foraminal stenosis M48.061    3. Stenosis of lateral recess of lumbar spine M48.061    4. Facet arthropathy, cervical M47.812    5. Neck pain M54.2    6. Myofascial pain M79.18        Assessment:       Patient is tolerating exercises well so far. She is working on home program. She continues to say that her left UT/neck bothers her all the time.   She is up for continuing with PT for the scheduled visits that she has.     Patient did well with exercises today.    Patient's expectations/goals are: Realistic  Barriers to Learning or Achieving Goals: previous medical history    Goals:  Pt. will demonstrate/verbalize independence in self-management of condition in : 12 weeks  Pt. will be independent with home exercise program in : 12 weeks    Pt will: improve walking tolerance for 2 minute walk test in 12 weeks  Pt will: improve neck pain in order to walk with less pain in 12 weeks         Plan:      Plan for next visit: continue with manual therapy on left UT for pain, work on exercises for LE strengthening, add to HEP as indicated.    Continue per POC  Patient is in agreement with PT plan of care and goals.      Subjective:       The neck hurts her all the time. The manual therapy not too helpful yet that she has noticed. Is doing all of  "her exercises.     Pain level: \"not having much pain today, just feels a little off today, the weather\"     Functional limitations:   Walking, strength in her legs, turning neck with neck pain     Objective:         Still limited neck rotation, pain to the left.    From eval:  2 min walk test: 4 lengths, felt 5/10 on OMNI scale. Neck pain increased.    Sit<>Stand: 8 in 30 sec    Exercises: see flowhseet for date performed in clinic  Exercise #1: sit<>Stand  Comment #1: 10 reps (not timed)  Exercise #2: neck rotation   Comment #2: 5x slowly both directions  Exercise #3: nustep   Comment #3: level 4, 5 minutes  Exercise #4: leg press  Comment #4: level 18, 15x double leg, level 10 single leg 150x bilat  Exercise #5: supine pec stretch hold 2 minutes   Comment #5: HEP  Exercise #6: supine Lower trunk rotation   Comment #6: 10x HEP  Exercise #7: supine single knee to chest  Comment #7: 3x 30 sec HEP  Exercise #8: seated shoulder rolls  Comment #8: 5 times every hour HEP  Exercise #9: standing heel/toe raises  Comment #9: 10x  Exercise #10: seated rows - orange band   Comment #10: 10x HEP addition         Treatment Today  TREATMENT MINUTES COMMENTS   Evaluation     Self-care/ Home management  Ed for lifestyle changes: Don't sit longer than 30 min when on the computer. Watch posture, pay attention to keeping head back. Trial going to [facility] for walking/nustep 3 times per week. Discussed that using a walker would e a good idea to help with increasing distance that she can walk.   Patient should start using her cane at all times at least for falls reduction   Manual therapy 30 Patient supine: sub occipital release, left UT, scalenes MFR. Manual stretching into rotation.   Neuromuscular Re-education     Therapeutic Activity     Therapeutic Exercises 25 See above.   Gait training     Modality__________________                Total 55    Blank areas are intentional and mean the treatment did not include these items.      "     Ilana Vides, PT, CLT  1/30/2020

## 2021-06-05 NOTE — TELEPHONE ENCOUNTER
ANTICOAGULATION  MANAGEMENT    Assessment     Today's INR result of 2.20 is Therapeutic (goal INR of 2.0 - 2.5)        Warfarin taken as previously instructed    No new diet changes affecting INR    No new medication/supplements affecting INR    Continues to tolerate warfarin with no reported s/s of bleeding or thromboembolism     Previous INR was Subtherapeutic at 1.50 on 1/30/2020.    Plan:     Spoke with Jaclyn regarding INR result and instructed:     Warfarin Dosing Instructions:  (evenings. has 5mg tabs)   - Continue current warfarin dose 7.5 mg daily on Mon/Thurs; and 5 mg daily rest of week.    Instructed patient to follow up no later than:  One wk.   - scheduled on 2/13/2020 @ [facility].    Education provided: target INR goal and significance of current INR result and importance of taking warfarin as instructed    Jaclyn verbalizes understanding and agrees to warfarin dosing plan.    Instructed to call the [facility] for any changes, questions or concerns. (#[phone])   ?   Dory Montenegro RN    Subjective/Objective:      Jaclyn Gibbs, a 87 y.o. female is on warfarin.     Jaclyn reports:     Home warfarin dose: template incorrect; verbally confirmed home dose with Jaclyn and updated on anticoagulation calendar - verbalized her current dose as instructed.     Missed doses: No     Medication changes:  No     S/S of bleeding or thromboembolism:  No     New Injury or illness:  No     Changes in diet or alcohol consumption:  No     Upcoming surgery, procedure or cardioversion:  No    Anticoagulation Episode Summary     Current INR goal:      TTR:   48.4 % (1 y)   Next INR check:   2/13/2020   INR from last check:   2.20 (2/6/2020)   Goal at result date:   2.0-3.0   Weekly max warfarin dose:      Target end date:   Indefinite   INR check location:      Preferred lab:      Send INR reminders to:   [facility]    Indications    Other acute pulmonary embolism without acute cor pulmonale  (H) (Resolved) [I26.99]           Comments:   Goal range 2.0 - 2.5 per Dr. Cardona, 12/12/18         Anticoagulation Care Providers     Provider Role Specialty Phone number    Robin Catherine MD Referring Internal Medicine [phone]

## 2021-06-05 NOTE — TELEPHONE ENCOUNTER
ANTICOAGULATION  MANAGEMENT    Assessment     Today's INR result of 1.60 is Subtherapeutic (goal INR of 2.0 - 2.5)        Warfarin taken as previously instructed    No new diet changes affecting INR    No new medication/supplements affecting INR    Continues to tolerate warfarin with no reported s/s of bleeding or thromboembolism     Previous INR was Subtherapeutic at 1.70 on 1/ Consulted with Sadnie Galaviz, PharmD    Plan:     Spoke with Jaclyn regarding INR result and instructed:    1.  Advised to throw out any warfarin dosings prior to 1/27.   2.  Follow warfarin instructions as given.    Warfarin Dosing Instructions:    - Change warfarin dose to 7.5 mg daily on Mondays; and 5 mg daily rest of week.   - (7 % change) - change of 7% based on the last 4 days taking 5mg daily.    Instructed patient to follow up no later than:  Schedule next INR this Thurs. 1/30/2020 @ [facility]   - before PT @ [facility]    Education provided: target INR goal and significance of current INR result and importance of taking warfarin as instructed    Jaclyn verbalizes understanding and agrees to warfarin dosing plan.    Instructed to call the [facility] for any changes, questions or concerns. (#[phone])   ?   Dory Montenegro RN    Subjective/Objective:      Jaclyn Gibbs, a 87 y.o. female is on warfarin.     Jaclyn reports:     Home warfarin dose: as updated on anticoagulation calendar per template     Missed doses: No     Medication changes:  Yes:  Reported stopping her Ferrous Sulfate.     S/S of bleeding or thromboembolism:  No     New Injury or illness:  No     Changes in diet or alcohol consumption:  No     Upcoming surgery, procedure or cardioversion:  No    Anticoagulation Episode Summary     Current INR goal:      TTR:   50.6 % (1 y)   Next INR check:   1/30/2020   INR from last check:   1.60! (1/27/2020)   Goal at result date:   2.0-3.0   Weekly max warfarin dose:      Target end date:   Indefinite   INR check  location:      Preferred lab:      Send INR reminders to:   [facility]    Indications    Other acute pulmonary embolism without acute cor pulmonale (H) (Resolved) [I26.99]           Comments:   Goal range 2.0 - 2.5 per Dr. Cardona, 12/12/18         Anticoagulation Care Providers     Provider Role Specialty Phone number    Robin Catherine MD Referring Internal Medicine [phone]

## 2021-06-05 NOTE — TELEPHONE ENCOUNTER
Anticoagulation Management    Unable to reach Jaclyn today, but able to speak to her .    Today's INR result of 1.5 is Subtherapeutic (goal INR of 2.0-3.0).     Follow up required to confirm warfarin doses taken.    Left message with  for her to take a booster dose of warfarin,  7.5 mg tonight and we'd speak to her tomorrow      ACN to follow up    Sandie Galaviz, PharmD

## 2021-06-05 NOTE — PROGRESS NOTES
Office Visit - Follow Up   Jaclyn Gibbs   87 y.o. female    Date of Visit: 1/27/2020    Chief Complaint   Patient presents with     Follow-up     fasting         Assessment and Plan   1. Chronic pulmonary embolism without acute cor pulmonale, unspecified pulmonary embolism type (H)  Now takes warfarin indefinitely because of chronic pulmonary embolism with 2 recurrences.  Takes warfarin 5 mg daily.  Due for INR today.  - INR    2. Essential hypertension with goal blood pressure less than 140/90  Controlled.  Continue amlodipine, indapamide, losartan.  Check CBC and basic metabolic panel.  - HM2(CBC w/o Differential)  - Basic Metabolic Panel    3. Hypercholesteremia  Controlled.  Last lipids were good, LDL 77, HDL 76, triglycerides 87 and total cholesterol 170.  Check fasting lipids, TSH and liver function.  - Lipid Cascade  - Thyroid Stimulating Hormone (TSH)  - Hepatic Profile    4. Coronary atherosclerosis due to lipid rich plaque  Has CAD due to small vessel disease.  No latasha coronary artery stenosis.  Followed by cardiology.  Causes her chronic shortness of breath.    5. NIELSON (dyspnea on exertion)  Still gets short of breath  due to her small vessel coronary artery disease.  Followed by cardiology, Dr. Cardona.  Takes nitroglycerin sublingual as needed.    6. OAB (overactive bladder)  Has overactive bladder.  Stopped taking her oxybutynin.  Now having increased frequency and occasional difficulty controlling her urination.  Resume oxybutynin 5 mg ER at bedtime.  - oxybutynin (DITROPAN XL) 5 MG ER tablet; Take 1 tablet (5 mg total) by mouth daily.  Dispense: 90 tablet; Refill: 3    7. Left lumbar radiculopathy  Has low back pains due to left lumbar radiculopathy.  Followed by the spine care clinic.  Got recent injection to her lower back and this resolved her chronic back pains.  But she expects her back pains will recur again after the effect of cortisone shot dissipates.    8. Osteopenia  Has  osteopenia.  Takes calcium and vitamin D.  Check vitamin D.  - Vitamin D, Total (25-Hydroxy)      Follow up in 4 months.     History of Present Illness   This 87 y.o. old female is here for follow-up.  Has chronic pulmonary embolism.  Had 2 recurrences.  Now on lifetime or indefinite warfarin.  Due for INR check today.  Has hypertension controlled by amlodipine, indapamide and losartan.  Has hypercholesterolemia, controlled without need for a medication.  Last lipids were good.  Has coronary artery disease due to small vessel disease.  Followed by cardiology.  Causes her to have chronic shortness of breath.  But does not have chest pains.  Has overactive bladder.  Reports it is acting up again because she stopped taking her oxybutynin.  Wants to resume oxybutynin again.  Has chronic lower back pains due to lumbar radiculopathy.  Followed by the spine care clinic.  Got recent cortisone injection to her lower back  which relieved her back pains.    Review of Systems   A 12 point comprehensive review of systems was negative except as noted..     Medications, Allergies and Problem List   Reviewed and updated             Chief Complaint   Follow-up (fasting )       Patient Profile   Social History     Social History Narrative    , 6 children, active, is a full code.  (last updated 2017)         Past Medical History   Patient Active Problem List   Diagnosis     Obesity     Osteopenia     Essential hypertension with goal blood pressure less than 140/90     Coronary Artery Disease     Hypercholesteremia     NIELSON (dyspnea on exertion)     Left lumbar radiculopathy     OAB (overactive bladder)     Anemia     Chronic pulmonary embolism without acute cor pulmonale, unspecified pulmonary embolism type (H)       Past Surgical History  She has a past surgical history that includes Appendectomy;  section (); pr ablate heart dysrhythm focus (2014); Cardiac catheterization; Coronary angioplasty  (03/08/2010); Cataract extraction (Bilateral); Excision basal cell carcinoma; Lumbar laminectomy (2009); Total knee arthroplasty (Right, 01/23/2003); Total knee arthroplasty (Left, 11/05/2008); Coronary Angiogram (N/A, 8/23/2018); and Left Heart Catheterization Without Left Ventriculogram (Left, 8/23/2018).       Medications and Allergies   Current Outpatient Medications   Medication Sig     amLODIPine (NORVASC) 5 MG tablet Take 1 tablet (5 mg total) by mouth daily.     CALCIUM CARB/MAGNESIUM OXID/D3 (CALCIUM MAGNESIUM + D ORAL) Take by mouth 2 (two) times a day. 750/300/500mg     GLUCOSAMINE/CHONDROITIN SULF A (GLUCOSAMINE-CHONDROITIN ORAL) Take 2 tablets by mouth daily.     indapamide (LOZOL) 2.5 MG tablet TAKE 1 TABLET EVERY DAY     losartan (COZAAR) 50 MG tablet Take 1 tablet (50 mg total) by mouth daily.     multivitamin (ONE A DAY) per tablet Take 1 tablet by mouth daily.     potassium chloride (K-DUR,KLOR-CON) 10 MEQ tablet Take 1 tablet (10 mEq total) by mouth daily.     warfarin (COUMADIN/JANTOVEN) 5 MG tablet Take 1 or 1 1/2 tabs daily Adjust dose based on INR results as directed..     nitroglycerin (NITROSTAT) 0.4 MG SL tablet Place 1 tablet (0.4 mg total) under the tongue as needed for chest pain (Keep in original bottle).     oxybutynin (DITROPAN XL) 5 MG ER tablet Take 1 tablet (5 mg total) by mouth daily.     Allergies   Allergen Reactions     Oxycodone Nausea And Vomiting     Codeine Nausea And Vomiting     Diazepam Nausea Only     Morphine Nausea And Vomiting        Family and Social History   Family History   Problem Relation Age of Onset     Stroke Mother 88     Hypertension Father      Sudden death Father 70        suspected heart attack      Breast cancer Sister      Venous thrombosis Sister 80        At age 80, and had breast CA     Cirrhosis Sister 34     Aneurysm Sister      Other Brother 25        Killed in WWII.     Brain cancer Daughter 5     No Medical Problems Daughter      No Medical  "Problems Son      No Medical Problems Son      No Medical Problems Son      Dementia Sister         Social History     Tobacco Use     Smoking status: Never Smoker     Smokeless tobacco: Never Used   Substance Use Topics     Alcohol use: No     Drug use: No        Physical Exam       Physical Exam  /76   Pulse 78   Ht 5' 3\" (1.6 m)   Wt 160 lb (72.6 kg)   SpO2 94%   BMI 28.34 kg/m    General appearance: alert, appears stated age, cooperative and no distress  Head: Normocephalic, without obvious abnormality, atraumatic  Throat: lips, mucosa, and tongue normal; teeth and gums normal  Neck: no adenopathy, no carotid bruit, no JVD, supple, symmetrical, trachea midline and thyroid not enlarged, symmetric, no tenderness/mass/nodules  Lungs: clear to auscultation bilaterally  Heart: regular rate and rhythm, S1, S2 normal, no murmur, click, rub or gallop  Abdomen: soft, non-tender; bowel sounds normal; no masses,  no organomegaly  Extremities: extremities normal, atraumatic, no cyanosis or edema  Skin: Skin color, texture, turgor normal. No rashes or lesions  Neurologic: Grossly normal   Musculoskeletal: Normal back exam     Additional Information   Post Discharge Medication Reconciliation Status: discharge medications reconciled, continue medications without change      Pancho Banks MD  Internal Medicine  Contact me at [phone]     Additional Information   Current Outpatient Medications   Medication Sig     amLODIPine (NORVASC) 5 MG tablet Take 1 tablet (5 mg total) by mouth daily.     CALCIUM CARB/MAGNESIUM OXID/D3 (CALCIUM MAGNESIUM + D ORAL) Take by mouth 2 (two) times a day. 750/300/500mg     GLUCOSAMINE/CHONDROITIN SULF A (GLUCOSAMINE-CHONDROITIN ORAL) Take 2 tablets by mouth daily.     indapamide (LOZOL) 2.5 MG tablet TAKE 1 TABLET EVERY DAY     losartan (COZAAR) 50 MG tablet Take 1 tablet (50 mg total) by mouth daily.     multivitamin (ONE A DAY) per tablet Take 1 tablet by mouth daily.     " potassium chloride (K-DUR,KLOR-CON) 10 MEQ tablet Take 1 tablet (10 mEq total) by mouth daily.     warfarin (COUMADIN/JANTOVEN) 5 MG tablet Take 1 or 1 1/2 tabs daily Adjust dose based on INR results as directed..     nitroglycerin (NITROSTAT) 0.4 MG SL tablet Place 1 tablet (0.4 mg total) under the tongue as needed for chest pain (Keep in original bottle).     oxybutynin (DITROPAN XL) 5 MG ER tablet Take 1 tablet (5 mg total) by mouth daily.     Allergies   Allergen Reactions     Oxycodone Nausea And Vomiting     Codeine Nausea And Vomiting     Diazepam Nausea Only     Morphine Nausea And Vomiting     Social History     Tobacco Use     Smoking status: Never Smoker     Smokeless tobacco: Never Used   Substance Use Topics     Alcohol use: No     Drug use: No         Time: total time spent with the patient was 25 minutes of which >50% was spent in counseling and coordination of care

## 2021-06-05 NOTE — TELEPHONE ENCOUNTER
ANTICOAGULATION  MANAGEMENT    Assessment     Today's INR result of 3.30 is Subtherapeutic (goal INR of 2.0 - 2.5)        Warfarin taken as previously instructed    No new diet changes affecting INR    No new medication/supplements affecting INR    Continues to tolerate warfarin with no reported s/s of bleeding or thromboembolism     Previous INR was Supratherapeutic at 2.90 on 1/6/2020    Consulted pharmacist, Sandie Galaviz, PharmD.    Plan:     Spoke with Jaclyn regarding INR result and instructed:     Warfarin Dosing Instructions:  (evening. has 5mg tabs)   - today, advised one time lower dose with 2.5mg warfarin,   - then change warfarin dose to 2.5 mg daily on Mon/Thur/Sat; and 5 mg daily rest of week.    - (8.3 % change)    Instructed patient to follow up no later than:  1-2 wks.   - scheduled PT @ [facility] on 1/23/20.    Education provided: target INR goal and significance of current INR result    Jaclyn verbalizes understanding and agrees to warfarin dosing plan.    Instructed to call the [facility] for any changes, questions or concerns. (#[phone])   ?   Dory Montenegro RN    Subjective/Objective:      Jaclyn Gibbs, a 87 y.o. female is on warfarin.     Jaclyn reports:     Home warfarin dose: as updated on anticoagulation calendar per template     Missed doses: No.      Medication changes:  Yes:  Reported taking one tablet this morning with ES-Tylenol before her INR.     S/S of bleeding or thromboembolism:  No     New Injury or illness:  Yes:  Reported, she has been miserable with a sore neck from mod-severe OA of cervical spine, which is ongoing and there is nothing they can do about it.        Changes in diet or alcohol consumption:  No     Upcoming surgery, procedure or cardioversion:  No.  Chronic TF-LEO of lumbar sacral spine every 3 months.    Anticoagulation Episode Summary     Current INR goal:      TTR:   52.6 % (1 y)   Next INR check:   1/23/2020   INR from last check:   3.30!  (1/14/2020)   Goal at result date:   2.0-3.0   Weekly max warfarin dose:      Target end date:   Indefinite   INR check location:      Preferred lab:      Send INR reminders to:   [facility]    Indications    Other acute pulmonary embolism without acute cor pulmonale (H) (Resolved) [I26.99]           Comments:   Goal range 2.0 - 2.5 per Dr. Cardona, 12/12/18         Anticoagulation Care Providers     Provider Role Specialty Phone number    Robin Catherine MD Referring Internal Medicine [phone]

## 2021-06-05 NOTE — TELEPHONE ENCOUNTER
Anticoagulation Management    Unable to reach Jaclyn today.    Today's INR result of 2.90 is Supratherapeutic (goal INR of 2.0 - 2.5).     Follow up required to discuss out of range INR .    No instructions provided. Unable to leave voicemail.    - consulted and discussed warfarin dose with Sandie Galaviz, PharmD.   - change and decrease warfarin dose to 2.5mg on Mon/Thurs and 5mg ROW.   - (change of 7.7%)   - recheck INR in 1-2 wks.       AC[facility] to follow up    Dory Montenegro RN

## 2021-06-05 NOTE — TELEPHONE ENCOUNTER
ANTICOAGULATION  MANAGEMENT    Assessment     Today's INR result of 2.90 is Supratherapeutic (goal INR of 2.0 - 2.5)        Warfarin taken as previously instructed    No new diet changes affecting INR    No new medication/supplements affecting INR    Continues to tolerate warfarin with no reported s/s of bleeding or thromboembolism     Previous INR was Subtherapeutic at 1.90 on 12/16/19.    Consulted Sandie Galaviz, PharmD.    Plan:     Spoke with Jaclyn regarding INR result and instructed:     Warfarin Dosing Instructions:   (has 5mg tabs)   - Change warfarin dose to 2.5 mg daily on Tues/Thurs; and 5 mg daily rest of week.   - (7.7 % change)    Instructed patient to follow up no later than:  1-2 wks.   - scheduled on 1/14/2020 during PT @ [facility]    Education provided: target INR goal and significance of current INR result    Jaclyn verbalizes understanding and agrees to warfarin dosing plan.    Instructed to call the [facility] for any changes, questions or concerns. (#[phone])   ?   Dory Montenegro RN    Subjective/Objective:      Jaclyn Gibbs, a 87 y.o. female is on warfarin.     Jaclyn reports:     Home warfarin dose: verbally confirmed home dose with Jaclyn and updated on anticoagulation calendar     Missed doses: No     Medication changes:  No     S/S of bleeding or thromboembolism:  No     New Injury or illness:  No     Changes in diet or alcohol consumption:  No     Upcoming surgery, procedure or cardioversion:  No    Anticoagulation Episode Summary     Current INR goal:      TTR:   54.1 % (1 y)   Next INR check:   1/20/2020   INR from last check:   2.90 (1/6/2020)   Goal at result date:   2.0-3.0   Weekly max warfarin dose:      Target end date:   Indefinite   INR check location:      Preferred lab:      Send INR reminders to:   [facility]    Indications    Other acute pulmonary embolism without acute cor pulmonale (H) (Resolved) [I26.99]           Comments:   Goal range 2.0  - 2.5 per Dr. Cardona, 12/12/18         Anticoagulation Care Providers     Provider Role Specialty Phone number    Robin Catherine MD Referring Internal Medicine [phone]

## 2021-06-05 NOTE — TELEPHONE ENCOUNTER
FYI - Status Update  Who is Calling: Patient  Update: Returning Debby's call - please call her again. (she knows [facility] Debby leaving at 4:00)  Okay to leave a detailed message?:  Yes

## 2021-06-06 NOTE — TELEPHONE ENCOUNTER
Okay to schedule her on or after March 7, 2020 (which will allow the time for PA - her insurance has typically required at least 10 business days/2 weeks for authorization).

## 2021-06-06 NOTE — PROGRESS NOTES
[facility] Discharge Summary  Patient Name: Jaclyn Gibbs  Date of evaluation: 1/6/2020  Today's Date: 2/19/2020  Visit Number: 6 of 6 (per med cert and order through 3-)  Referring provider: Dr. 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 Lumbar foraminal stenosis M48.061    3. Stenosis of lateral recess of lumbar spine M48.061    4. Facet arthropathy, cervical M47.812    5. Neck pain M54.2    6. Myofascial pain M79.18      Assessment:       At this time in PT, we have been working together for 6 sessions. She doesn't feel like we have made many gains with regard to her neck pain.  But, she also states she doesn't think there are going to be many gains with regard to her neck pain.   Her left UT/neck are still very sore constantly.   Today, she states her back is bothering her more as well and feels like she is ready for an injection.   We discussed that she should continue with her exercises that we discussed (had to keep it light d/t patient's tolerance) and return to referring provider.     Patient's expectations/goals are: Realistic  Barriers to Learning or Achieving Goals: previous medical history    Goals:  Pt. will demonstrate/verbalize independence in self-management of condition: NOT MET  Pt. will be independent with home exercise program: MET  Pt will: improve walking tolerance for 2 minute walk test: NOT TESTED, patient not feeling well today.   Pt will: improve neck pain in order to walk with less pain: NOT MET       Plan:      DC PT     Subjective:        Not feeling  great today. Not feeling like going out of the house in this weather.   Not going to the [facility]     Functional limitations:   Walking, strength in her legs, turning neck with neck pain     Objective:       Neck rotation is still very painful in the left side of her neck.     No questions on exercises, doing them well.     Exercises: see flowhseet for date performed in clinic  Exercise #1: sit<>Stand  Comment #1: 10 reps (not timed)  Exercise #2: neck rotation   Comment #2: 5x slowly both directions  Exercise #3: nustep   Comment #3: level 4, 5 minutes  Exercise #4: leg press  Comment #4: level 18, 15x double leg, level 10 single leg 150x bilat  Exercise #5: supine pec stretch hold 2 minutes   Comment #5: HEP  Exercise #6: supine Lower trunk rotation   Comment #6: 10x HEP  Exercise #7: supine single knee to chest  Comment #7: 3x 30 sec HEP  Exercise #8: seated shoulder rolls  Comment #8: 5 times every hour HEP  Exercise #9: standing heel/toe raises  Comment #9: 10x  Exercise #10: seated rows - orange band   Comment #10: 10x reviewed  Exercise #11: seated neck retraction - many cues for this.   Comment #11: seated scap retraction        Treatment Today  TREATMENT MINUTES COMMENTS   Evaluation     Self-care/ Home management  Ed for lifestyle changes: Don't sit longer than 30 min when on the computer. Watch posture, pay attention to keeping head back. Trial going to [facility] for walking/nustep 3 times per week. Discussed that using a walker would e a good idea to help with increasing distance that she can walk.   Patient should start using her cane at all times at least for falls reduction   Manual therapy 30 Patient supine: sub occipital release, left UT, scalenes MFR. Manual stretching into rotation.   Neuromuscular Re-education     Therapeutic Activity     Therapeutic Exercises 10 Reviewed exercises, encouraged to keep up with moving frequently.   Gait training     Modality__________________                Total 40     Blank areas are intentional and mean the treatment did not include these items.          Ilana Vides, PT, CLT  2/19/2020

## 2021-06-06 NOTE — TELEPHONE ENCOUNTER
"Call received from pt requesting repeat injection. Pt last had Left L5-S1 TFESI with Dr. Torres on 12/9/2019. Pt reports almost 100% relief of her pain until approximately 1 week ago when the same pain returned. Pt has left-sided low back pain with left radicular symptoms.     Pt states \"I know I'm not quite at the 3 month rand, but I will not be able to wait that long. It's too bad and I just won't make it another couple of weeks\". Will send message to provider to see if ok for repeat injection at this time.     Upon chart review, pt has had 4 injections since 3/7/2019.       "

## 2021-06-06 NOTE — TELEPHONE ENCOUNTER
ANTICOAGULATION  MANAGEMENT    Assessment     Today's INR result of 2.80 is Supratherapeutic (goal INR of 2.0 - 2.5)        Warfarin taken as previously instructed    No new diet changes affecting INR    No new medication/supplements affecting INR    Continues to tolerate warfarin with no reported s/s of bleeding or thromboembolism     Previous INR was Supratherapeutic at 2.70 on [id number].    Consulted pharmacist - Sandie Galaviz, PharmD    Scheduled for steroidal injection of LS on 2/28/20.  No interruption with warfarin.    Plan:     Spoke with Jaclyn regarding INR result and instructed:     Warfarin Dosing Instructions:    - Change warfarin dose to 7.5 mg daily on Mondays; and 5 mg daily rest of week.   - (6.3 % change)    Instructed patient to follow up no later than:  1-2 wks.   - scheduled on 3/11/20 @ [facility]    Education provided: importance of consistent vitamin K intake, target INR goal and significance of current INR result, importance of taking warfarin as instructed, importance of notifying clinic for changes in medications and importance of notifying clinic of upcoming surgeries and procedures 2 weeks in advance    Jaclyn verbalizes understanding and agrees to warfarin dosing plan.    Instructed to call the [facility] for any changes, questions or concerns. (#[phone])   ?   Dory Montenegro RN    Subjective/Objective:      Jaclyn Gibbs, a 87 y.o. female is on warfarin.     Jaclyn reports:     Home warfarin dose: template incorrect; verbally confirmed home dose with Jaclyn and updated on anticoagulation calendar - verbalized back correct warfarin dose, as instructed.     Missed doses: No     Medication changes:  No     S/S of bleeding or thromboembolism:  No     New Injury or illness:  No     Changes in diet or alcohol consumption:  No     Upcoming surgery, procedure or cardioversion:  Yes: scheduled [facility]    Anticoagulation Episode  Summary     Current INR goal:      TTR:   48.9 % (1 y)   Next INR check:   3/11/2020   INR from last check:   2.80 (2/26/2020)   Goal at result date:   2.0-3.0   Weekly max warfarin dose:      Target end date:   Indefinite   INR check location:      Preferred lab:      Send INR reminders to:   [facility]    Indications    Other acute pulmonary embolism without acute cor pulmonale (H) (Resolved) [I26.99]           Comments:   Goal range 2.0 - 2.5 per Dr. Cardona, 12/12/18         Anticoagulation Care Providers     Provider Role Specialty Phone number    Robin Catherine MD Referring Internal Medicine [phone]

## 2021-06-06 NOTE — PROGRESS NOTES
Assessment:   Jaclyn Gibbs is a 87 y.o. y.o. female with past medical history significant for pulmonary embolism (on Coumadin), coronary artery disease, hyperlipidemia, hypertension, osteopenia who presents today for follow-up regarding a new complaint of chronic left-sided neck pain without radicular type arm symptoms, present for the last year and worsening recently.  The patient's neck pain is likely secondary to severe osteoarthritis and she has several areas of severe facet arthropathy/ankylosis seen at multiple levels.  She is neurologically intact and without any red flag or myelopathic signs/symptoms.    The patient's chronic left-sided low back pain with left radicular/sciatic type leg pain from severe left L5-S1 foraminal stenosis has significantly improved following a left L5-S1 transforaminal epidural steroid injection on February 28 of 2020.       Plan:     A shared decision making plan was used.  The patient's values and choices were respected.  The following represents what was discussed and decided upon by the physician and the patient.      1.  DIAGNOSTIC TESTS: The patient's MRI of the cervical spine from August 6 of 2019 performed at [facility] was personally reviewed today by the physician with the physician performing her own interpretation.  The patient has severe facet arthropathy seen at the C2-3 level with significant central canal stenosis and severe bilateral foraminal stenosis.  At the C3-4 level, she has ankylosis of the right C3-4 facet joint.  There is severe bilateral neuroforaminal stenosis.  At the C4-5 level, she does have central canal stenosis with bilateral (right greater than left) facet arthropathy and severe bilateral foraminal stenosis.  At the C5-6 level, she does have ankylosis of the bilateral facets with mild bilateral foraminal stenosis.  At the C6-7 level, she does have central canal stenosis with mild left facet arthropathy.  She has severe left C6-7 foraminal  stenosis and moderate right C6-7 foraminal stenosis.  At the C7-T1 level there is mild facet arthropathy with mild to moderate right neuroforaminal stenosis and mild left foraminal stenosis.  The images were shown to the patient and the findings were explained.  Please refer to the radiology report for further details.  - No further diagnostic tests are necessary at this time.    2.  PHYSICAL THERAPY: The patient was referred for physical therapy in December 2019 to work on both the neck and the low back.  Patient states that the physical therapist mainly worked on the low back, and she has not learned any exercises for the neck.  Another order for physical therapy to focus specifically on the neck was provided today.  She would like to work with Barbara Vides here at the spine center.  3.  MEDICATIONS: No changes to her medications at this time.  She can continue to take over-the-counter Tylenol.  She can take between 500 and 1000 mg up to 3-6 times per day.  She should not exceed 3000 mg in a 24-hour period.  4.  INTERVENTIONS:    -Discussed osteopathic manipulation with the patient.  She would very much like to try this type of treatment.  We will check with her insurance company to make sure that he manages not require any prior authorization before moving forward with this treatment.  She can schedule this for 1 to 2 weeks out.  -Discussed with the patient that if she fails to have relief with both physical therapy and with osteopathic manipulation, that she would be a candidate for cervical facet joint injections.  Would recommend starting with a left C2-3 and C3-4 facet joint injection.  If she failed to have relief with that, could consider a left C4-5 facet joint injection.  Would need to use caution with the steroid injections, she already received steroid injections for the low back.  5.  PATIENT EDUCATION:    -Discussed the diagnosis of cervical facet joint syndrome from osteoarthritis with the  patient.  -Her questions were answered to her satisfaction today.  She was in agreement with the treatment plan.  6.  FOLLOW-UP: Patient follow-up in 1 to 2 weeks for osteopathic manipulation.  If there are any questions/concerns or any significant worsening of pain prior to that time, the patient is asked to call the clinic via the nurse navigation line or via [employer].      Subjective:     Jaclyn Gibbs is a 87 y.o. female who presents today for follow-up regarding left-sided neck pain/stiffness.  The patient reports that she has had these symptoms for about a year.  They have not improved and she actually feels like they may be worsening.  The pain is just located in the left side of her neck.  It is in the neck and does not go to the top of the shoulder.  She can get a headache that seems to be associated with the neck pain, though the headache is located frontally and not occipitally.  She describes the pain is like a tightness or stiffness.  She denies any radiation of pain going down into the arm.  No numbness tingling or weakness in the arm.  Her pain is worse with turning her head to the left and at the end of the day.  She typically feels better early on in the day.  She does not have any pain whatsoever when she is sleeping.  She is taking Tylenol arthritis which does help significantly.  She denies any symptoms in the right side.  She currently rates her pain today at a 4 out of 10.  At worst it is an 8 out of 10.  At best it is a 1 out of 10.  She is interested in treatment options for the neck.    Past medical history is reviewed and is unchanged for any new medical diagnoses in the interim.      Family history is reviewed and is unchanged in the interim.        Review of Systems:  Positive for occasional headaches as stated in the HPI.  Positive for weakness in the left leg though this is improved..  Pertinent negatives include no fevers, chills, unexplained weight loss, bowel incontinence,  bladder incontinence, trips, stumbles, falls.  All others reviewed and are negative.     Objective:   CONSTITUTIONAL:  Vital signs as above.  No acute distress.  The patient is well nourished and well groomed.    PSYCHIATRIC:  The patient is awake, alert, oriented to person, place and time.  The patient is answering questions appropriately with clear speech.  Normal affect.  SKIN:  Skin over the face, neck bilateral upper extremities is clean, dry, intact without rashes.  MUSCULOSKELETAL: The patient has 5/5 strength for the bilateral shoulder abductors, elbow flexors/extensors, wrist extensors, finger flexors/abductors.  Patient has largely normal cervical flexion but she does report some pain in the left neck with cervical flexion.  She has restricted cervical extension and she does report increased pain with cervical extension.  She has significantly restricted bilateral cervical rotation and bilateral cervical sidebending, with more restriction going to the left side with both of these planes of motions.  She does not have any significant tenderness over the cervical paraspinal muscles bilaterally.  No tenderness or hypertonicity over the bilateral upper trapezius muscles.  No tenderness over the occipital nerves bilaterally.  NEUROLOGICAL: Trace biceps, brachioradialis, triceps reflexes bilaterally.  Negative Reyes's bilaterally.  Sensation to light touch is intact in all of the digits of both upper extremities.

## 2021-06-06 NOTE — TELEPHONE ENCOUNTER
ANTICOAGULATION  MANAGEMENT    Assessment     Today's INR result of 2.30 is Therapeutic (goal INR of 2.0 - 2.5)        Warfarin taken as previously instructed    No new diet changes affecting INR    No new medication/supplements affecting INR    Continues to tolerate warfarin with no reported s/s of bleeding or thromboembolism     Previous INR was Therapeutic at 2.20 on 2/6/2020    Plan:     Spoke with Abdullahi regarding INR result and instructed:    1.  Advised Abdullahi, if Jaclyn has any questions, to call back.    Warfarin Dosing Instructions:  (evenings. has 5mg tabs)   - Continue current warfarin dose 7.5 mg daily on Mon / Thurs; and 5 mg daily rest of week.    Instructed patient to follow up no later than:  One week   - scheduled on 2/19/2020 @ [facility]    Education provided: importance of consistent vitamin K intake, target INR goal and significance of current INR result and importance of notifying clinic for changes in medications    Abdullahi verbalizes understanding and agrees to warfarin dosing plan.    Instructed to call the [facility] for any changes, questions or concerns. (#[phone])   ?   Dory Montenegro RN    Subjective/Objective:      Jaclyn Gibbs, a 87 y.o. female is on warfarin.     Jaclyn reports:     Home warfarin dose: as updated on anticoagulation calendar per template     Missed doses: No     Medication changes:  No     S/S of bleeding or thromboembolism:  No     New Injury or illness:  No     Changes in diet or alcohol consumption:  No     Upcoming surgery, procedure or cardioversion:  No    Anticoagulation Episode Summary     Current INR goal:      TTR:   48.4 % (1 y)   Next INR check:   2/19/2020   INR from last check:   2.30 (2/13/2020)   Goal at result date:   2.0-3.0   Weekly max warfarin dose:      Target end date:   Indefinite   INR check location:      Preferred lab:      Send INR reminders to:   [facility]    Indications    Other acute  pulmonary embolism without acute cor pulmonale (H) (Resolved) [I26.99]           Comments:   Goal range 2.0 - 2.5 per Dr. Cardona, 12/12/18         Anticoagulation Care Providers     Provider Role Specialty Phone number    Robin Catherine MD Referring Internal Medicine [phone]

## 2021-06-06 NOTE — PATIENT INSTRUCTIONS - HE
An order for physical therapy has been provided today.  You can schedule physical therapy before you leave today or someone will call you to schedule physical therapy.  It will be very important for you to do your physical therapy exercises on a regular basis to decrease your pain and prevent future flares of pain.    You can schedule for 1-2 weeks out for osteopathic manipulation with Dr. Torres allow time to check with your insurance about the manipulation.  You can schedule physical therapy and osteopathic manipulation on the same day.  Please make sure to schedule physical therapy before osteopathic manipulation with Dr. Torres..

## 2021-06-06 NOTE — TELEPHONE ENCOUNTER
RN cannot approve Refill Request    RN can NOT refill this medication Protocol failed and NO refill given.     Itzel Donovan, [facility] Triage/Med Refill 3/2/2020    Requested Prescriptions   Pending Prescriptions Disp Refills     warfarin ANTICOAGULANT (COUMADIN/JANTOVEN) 5 MG tablet 110 tablet 3     Sig: Take 1 or 1 1/2 tabs daily Adjust dose based on INR results as directed.       Warfarin Refill Protocol  Failed - 3/2/2020 10:52 AM        Failed -  Route to appropriate pool/provider     Last Anticoagulation Summary:   Anticoagulation Episode Summary     Current INR goal:      TTR:   49.9 % (1 y)   Next INR check:   3/11/2020   INR from last check:   2.50 (2/28/2020)   Goal at result date:   2.0-3.0   Weekly max warfarin dose:      Target end date:   Indefinite   INR check location:      Preferred lab:      Send INR reminders to:   [facility]    Indications    Other acute pulmonary embolism without acute cor pulmonale (H) (Resolved) [I26.99]           Comments:   Goal range 2.0 - 2.5 per Dr. Cardona, 12/12/18         Anticoagulation Care Providers     Provider Role Specialty Phone number    Dorene Robin Barrera MD Referring Internal Medicine [phone]                Passed - Provider visit in last year     Last office visit with prescriber/PCP: 1/27/2020 Pancho Banks MD OR same dept: 1/27/2020 Pancho Banks MD OR same specialty: 1/27/2020 Pancho Banks MD  Last physical: Visit date not found Last MTM visit: Visit date not found    Next appt within 3 mo: Visit date not found Next physical within 3 mo: Visit date not found  Prescriber OR PCP: Pancho Banks MD  Last diagnosis associated with med order: There are no diagnoses linked to this encounter.  If protocol passes may refill for 6 months if within 3 months of last provider visit (or a total of 9 months).

## 2021-06-06 NOTE — TELEPHONE ENCOUNTER
ANTICOAGULATION  MANAGEMENT    Assessment     Today's INR result of 2.50 is Therapeutic (goal INR of 2.0 - 2.5)        Plan:     No call made to patient - INR tested prior to TF-LEO steroidal injection of lumbosacral spine [facility] regarding [facility]R result and instructed:    - there was no interruption with warfarin doses.    Instructed patient to follow up no later than:  INR already scheduled to check on 3/11/20.    Instructed to call the [facility] for any changes, questions or concerns. (#[phone])   ?   Dory Montenegro RN    Anticoagulation Episode Summary     Current INR goal:      TTR:   49.5 % (1 y)   Next INR check:   3/11/2020   INR from last check:   2.50 (2/28/2020)   Goal at result date:   2.0-3.0   Weekly max warfarin dose:      Target end date:   Indefinite   INR check location:      Preferred lab:      Send INR reminders to:   [facility]    Indications    Other acute pulmonary embolism without acute cor pulmonale (H) (Resolved) [I26.99]           Comments:   Goal range 2.0 - 2.5 per Dr. Cardona, 12/12/18         Anticoagulation Care Providers     Provider Role Specialty Phone number    Robin Catherine MD Referring Internal Medicine [phone]

## 2021-06-06 NOTE — PROGRESS NOTES
[facility] Rehabilitation Daily Progress Note  Patient Name: Jaclyn Gibbs  Date of evaluation: 1/6/2020  Today's Date: 2/13/2020  Visit Number: 5 of 6 (per med cert and order through 3-)  Referring provider: Dr. 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 Lumbar foraminal stenosis M48.061    3. Stenosis of lateral recess of lumbar spine M48.061    4. Facet arthropathy, cervical M47.812    5. Neck pain M54.2    6. Myofascial pain M79.18      Assessment:       Patient has been trying all of her exercises.   Her left UT/neck region is stiff and sore. She is feeling like it's not getting any better. Feels like it's getting her down. Would like to discuss with referring provider.     Patient did well with exercises today.    Patient's expectations/goals are: Realistic  Barriers to Learning or Achieving Goals: previous medical history    Goals:  Pt. will demonstrate/verbalize independence in self-management of condition in : 12 weeks  Pt. will be independent with home exercise program in : 12 weeks    Pt will: improve walking tolerance for 2 minute walk test in 12 weeks  Pt will: improve neck pain in order to walk with less pain in 12 weeks         Plan:      Plan for next visit: continue with manual therapy on left UT for pain, work on exercises for LE strengthening, add to HEP as indicated.    Continue per POC  Patient is in agreement with PT plan of care and goals.      Subjective:        Neck rotation exercise is painful.   Sit<>Stand, she feels very wobbly afterward.   The elastic band exercise,  she has a question on it.   Left neck pain is constant, not getting any better.   Neck was a little better for a day after manual therapy.   Hasn't felt good the past 2 days.   Not going to the [facility]     Functional limitations:   Walking, strength in her legs, turning neck with neck pain     Objective:       Instruction to continue trying neck rotation, back off on reps, but continue ti keep neck moving.     Instruction to still perfom sit<>Stand exercise, but to break it up and do 5 in am and 5 in pm so she doesn't get too fatigued.      Corrected form with rows.     Exercises: see flowhseet for date performed in clinic  Exercise #1: sit<>Stand  Comment #1: 10 reps (not timed)  Exercise #2: neck rotation   Comment #2: 5x slowly both directions  Exercise #3: nustep   Comment #3: level 4, 5 minutes  Exercise #4: leg press  Comment #4: level 18, 15x double leg, level 10 single leg 150x bilat  Exercise #5: supine pec stretch hold 2 minutes   Comment #5: HEP  Exercise #6: supine Lower trunk rotation   Comment #6: 10x HEP  Exercise #7: supine single knee to chest  Comment #7: 3x 30 sec HEP  Exercise #8: seated shoulder rolls  Comment #8: 5 times every hour HEP  Exercise #9: standing heel/toe raises  Comment #9: 10x  Exercise #10: seated rows - orange band   Comment #10: 10x reviewed  Exercise #11: seated neck retraction - many cues for this.   Comment #11: seated scap retraction        Treatment Today  TREATMENT MINUTES COMMENTS   Evaluation     Self-care/ Home management  Ed for lifestyle changes: Don't sit longer than 30 min when on the computer. Watch posture, pay attention to keeping head back. Trial going to [facility] for walking/nustep 3 times per week. Discussed that using a walker would e a good idea to help with increasing distance that she can walk.   Patient should start using her cane at all times at least for falls reduction   Manual therapy 25 Patient supine: sub occipital release, left UT, abigail MFR. Manual  stretching into rotation.   Neuromuscular Re-education     Therapeutic Activity     Therapeutic Exercises 30 See above    Gait training     Modality__________________                Total 55    Blank areas are intentional and mean the treatment did not include these items.          Ilana Vides, PT, CLT  2/13/2020

## 2021-06-06 NOTE — TELEPHONE ENCOUNTER
"Call to pt with provider's response. Pt stated understanding. Order placed for repeat injection to be scheduled on or after 3/7/2020. Per Dr Torres, this appt can be moved up if approval received early. Injection requirements reviewed. Pt is on Coumadin. Transferred pt to scheduling to make appt.     Pt was offered gabapentin as well as  follow-up appt with provider to discuss medication options if needed prior to injection. Pt declines this and states she will use ibuprofen as needed for her pain as she reports, \"this takes the edge off\".  "

## 2021-06-06 NOTE — PATIENT INSTRUCTIONS - HE
Follow-up visit in 2 weeks if symptoms worsen or fail to improve.  Otherwise, please follow-up on an as-needed basis going forward.       DISCHARGE INSTRUCTIONS    During office hours (8:00 a.m.- 4:00 p.m.) questions or concerns may be answered  by calling [facility] Nurses at  [phone].  Messages received after hours will be returned the following business day.      In the case of an emergency, please dial 911 or seek assistance at the nearest Emergency Room/Urgent Care facility.     All Patients:    ? You may experience an increase in your symptoms for the first 2 days (It may take anywhere between 2 days- 2 weeks for the steroid to have maximum effect).    ? You may use ice on the injection site, as frequently as 20 minutes each hour if needed.    ? You may take your pain medicine.    ? You may continue taking your regular medication after your injection. If you have had a Medial Branch Block you may resume pain medication once your pain diary is completed.    ? You may shower. No swimming, tub bath or hot tub for 48 hours.  You may remove your bandaid/bandage as soon as you are home.    ? You may resume light activities, as tolerated.    ? Resume your usual diet as tolerated.    ? It is strongly advised that you do not drive for 1-3 hours post injection.    ? If you have had oral sedation:  Do not drive for 8 hours post injection.      ? If you have had IV sedation:  Do not drive for 24 hours post injection.  Do not operate hazardous machinery or make important personal/business decisions for 24 hours.      POSSIBLE STEROID SIDE EFFECTS (If steroid/cortisone was used for your procedure)    -If you experience these symptoms, it should only last for a short period      Swelling of the legs                Skin redness (flushing)       Mouth (oral) irritation     Blood sugar (glucose) levels              Sweats                      Mood changes    Headache    Sleeplessness         POSSIBLE PROCEDURE  SIDE EFFECTS  -Call the [facility] if you are concerned    Increased Pain             Increased numbness/tingling        Nausea/Vomiting            Bruising/bleeding at site        Redness or swelling                                                Difficulty walking        Weakness             Fever greater than 100.5    *In the event of a severe headache after an epidural steroid injection that is relieved by lying down, please call the [facility] to speak with a clinical staff member*

## 2021-06-06 NOTE — TELEPHONE ENCOUNTER
Refill Approved    Rx renewed per Medication Renewal Policy. Medication was last renewed on 4/17/19.    Itzel Donovan, [facility] Connection Triage/Med Refill 3/4/2020     Requested Prescriptions   Pending Prescriptions Disp Refills     indapamide (LOZOL) 2.5 MG tablet 90 tablet 3     Sig: TAKE 1 TABLET EVERY DAY       Diuretics/Combination Diuretics Refill Protocol  Passed - 3/3/2020  1:48 PM        Passed - Visit with PCP or prescribing provider visit in past 12 months     Last office visit with prescriber/PCP: 1/27/2020 Pancho Banks MD OR same dept: 1/27/2020 Pancho Banks MD OR same specialty: 1/27/2020 Pancho Banks MD  Last physical: Visit date not found Last MTM visit: Visit date not found   Next visit within 3 mo: Visit date not found  Next physical within 3 mo: Visit date not found  Prescriber OR PCP: Pancho Banks MD  Last diagnosis associated with med order: 1. Essential hypertension with goal blood pressure less than 140/90  - indapamide (LOZOL) 2.5 MG tablet; TAKE 1 TABLET EVERY DAY  Dispense: 90 tablet; Refill: 3  - losartan (COZAAR) 50 MG tablet; Take 1 tablet (50 mg total) by mouth daily.  Dispense: 90 tablet; Refill: 3    If protocol passes may refill for 12 months if within 3 months of last provider visit (or a total of 15 months).             Passed - Serum Potassium in past 12 months      Lab Results   Component Value Date    Potassium 3.6 01/27/2020             Passed - Serum Sodium in past 12 months      Lab Results   Component Value Date    Sodium 141 01/27/2020             Passed - Blood pressure on file in past 12 months     BP Readings from Last 1 Encounters:   02/28/20 142/74             Passed - Serum Creatinine in past 12 months      Creatinine   Date Value Ref Range Status   01/27/2020 0.89 0.60 - 1.10 mg/dL Final             losartan (COZAAR) 50 MG tablet 90 tablet 3     Sig: Take 1 tablet (50 mg total) by mouth daily.       Angiotensin Receptor Blocker Protocol  Passed - 3/3/2020  1:48 PM        Passed - PCP or prescribing provider visit in past 12 months       Last office visit with prescriber/PCP: 1/27/2020 Pancho Banks MD OR same dept: 1/27/2020 Pancho Banks MD OR same specialty: 1/27/2020 Pancho Banks MD  Last physical: Visit date not found Last MTM visit: Visit date not found   Next visit within 3 mo: Visit date not found  Next physical within 3 mo: Visit date not found  Prescriber OR PCP: Pancho Banks MD  Last diagnosis associated with med order: 1. Essential hypertension with goal blood pressure less than 140/90  - indapamide (LOZOL) 2.5 MG tablet; TAKE 1 TABLET EVERY DAY  Dispense: 90 tablet; Refill: 3  - losartan (COZAAR) 50 MG tablet; Take 1 tablet (50 mg total) by mouth daily.  Dispense: 90 tablet; Refill: 3    If protocol passes may refill for 12 months if within 3 months of last provider visit (or a total of 15 months).             Passed - Serum potassium within the past 12 months     Lab Results   Component Value Date    Potassium 3.6 01/27/2020             Passed - Blood pressure filed in past 12 months     BP Readings from Last 1 Encounters:   02/28/20 142/74             Passed - Serum creatinine within the past 12 months     Creatinine   Date Value Ref Range Status   01/27/2020 0.89 0.60 - 1.10 mg/dL Final

## 2021-06-06 NOTE — TELEPHONE ENCOUNTER
Refill Request  Did you contact pharmacy: No  Medication name:   Requested Prescriptions     Pending Prescriptions Disp Refills     warfarin ANTICOAGULANT (COUMADIN/JANTOVEN) 5 MG tablet 110 tablet 3     Sig: Take 1 or 1 1/2 tabs daily Adjust dose based on INR results as directed.     Who prescribed the medication:   Hong Nix MD  Requested Pharmacy: [id number]  Is patient out of medication: No.  4 days left  Patient notified refills processed in 3 business days:  yes  Okay to leave a detailed message: yes      Patient is requesting above medication thru Primary Care Provider.  Thank you.

## 2021-06-06 NOTE — TELEPHONE ENCOUNTER
ANTICOAGULATION  MANAGEMENT    Assessment     Today's INR result of 2.70 is Supratherapeutic (goal INR of 2.0 - 2.5)        Warfarin taken as previously instructed    No new diet changes affecting INR    No new medication/supplements affecting INR    Continues to tolerate warfarin with no reported s/s of bleeding or thromboembolism     Previous INR was Therapeutic at .30 on 2/13/2020.    Consulted pharmacist, Sandie Galaviz, PharmD.    Plan:     Spoke with Jaclyn regarding INR result and instructed:     Warfarin Dosing Instructions:  (evenings. Has 5mg tabs)   - Continue current warfarin dose 7.5 mg daily on Mon/Thurs; and 5 mg daily rest of week.    Instructed patient to follow up no later than: 1-2 wks.   - scheduled on 2/6/2020 @ [facility].    Education provided: importance of consistent vitamin K intake, target INR goal and significance of current INR result and importance of notifying clinic for changes in medications    Jaclyn verbalizes understanding and agrees to warfarin dosing plan.    Instructed to call the [facility] for any changes, questions or concerns. (#[phone])   ?   Dory Montenegro RN    Subjective/Objective:      Jaclyn Gibbs, a 87 y.o. female is on warfarin.     Jaclyn reports:     Home warfarin dose: as updated on anticoagulation calendar per template     Missed doses: No     Medication changes:  No     S/S of bleeding or thromboembolism:  No     New Injury or illness:  No     Changes in diet or alcohol consumption:  No     Upcoming surgery, procedure or cardioversion:  No    Anticoagulation Episode Summary     Current INR goal:      TTR:   48.4 % (1 y)   Next INR check:   2/26/2020   INR from last check:   2.70 (2/19/2020)   Goal at result date:   2.0-3.0   Weekly max warfarin dose:      Target end date:   Indefinite   INR check location:      Preferred lab:      Send INR reminders to:   [facility]    Indications    Other acute pulmonary embolism without acute cor  pulmonale (H) (Resolved) [I26.99]           Comments:   Goal range 2.0 - 2.5 per Dr. Cardona, 12/12/18         Anticoagulation Care Providers     Provider Role Specialty Phone number    Robin Catherine MD Referring Internal Medicine [phone]

## 2021-06-07 NOTE — PATIENT INSTRUCTIONS - HE
Ms Gibbs,  It was a pleasure speaking with you today.  I am sorry you are still short of breath and now lightheaded and feeling off balance with walking.  I cannot blame the heart on this but still would like to help you.  Try cutting the amlodipine in half, or take only 2.5 mg a day.  Hopefully that will help.  Call me at [phone] if you have major problems with this.  Stay safe, and I will plan on seeing you in 6 months.  Chago Cardona

## 2021-06-07 NOTE — PROGRESS NOTES
Review of Systems - History obtained from the patient  General ROS: negative  Psychological ROS: negative  Ophthalmic ROS: negative  ENT ROS: negative  Hematological and Lymphatic ROS: negative  Respiratory ROS: positive for - shortness of breath  Cardiovascular ROS: positive for - irregular heartbeat and shortness of breath of activity  Gastrointestinal ROS: negative  Genito-Urinary ROS: positive for - frequent urination at night   Musculoskeletal ROS: positive for - joint pain, muscle weakness and muscle pain  Neurological ROS: positive for - loss of balance and daytime sleepiness  Dermatological ROS: negative

## 2021-06-07 NOTE — TELEPHONE ENCOUNTER
ANTICOAGULATION  MANAGEMENT PROGRAM    Jaclyn Gibbs is overdue for INR check.     Spoke with Jaclyn and scheduled INR appointment on 4/2.      Ekaterina Torres RN

## 2021-06-07 NOTE — TELEPHONE ENCOUNTER
ANTICOAGULATION  MANAGEMENT    Assessment     Today's INR result of 2.70 is Supratherapeutic (goal INR of 2.0 - 2.5)        Warfarin taken differently than instructed, but no impact to total weekly dose    No new diet changes affecting INR    No new medication/supplements affecting INR    Continues to tolerate warfarin with no reported s/s of bleeding or thromboembolism     Previous INR was Subtherapeutic at 1.90 on 4/6/20    Consulted pharmacist, Sandie Galaviz, PharmD.      Plan:     Spoke with Aureliano regarding INR result and instructed:     Warfarin Dosing Instructions:    - Continue current warfarin dose 7.5 mg daily on Tuesdays; and 5 mg daily rest of week.    Instructed patient to follow up no later than:  1-2 wks.   - INR scheduled on 5/4/20 @ [facility]    Education provided: importance of consistent vitamin K intake and target INR goal and significance of current INR result    Aureliano verbalizes understanding and agrees to warfarin dosing plan.    Instructed to call the [facility] for any changes, questions or concerns. (#[phone])   ?   Dory Montenegro RN    Subjective/Objective:      Aureliano Gibbs, a 87 y.o. female is on warfarin.     Aureliano reports:     Home warfarin dose: verbally confirmed home dose with Aureliano and updated on anticoagulation calendar     Missed doses: No     Medication changes:  No     S/S of bleeding or thromboembolism:  No     New Injury or illness:  No     Changes in diet or alcohol consumption:  No     Upcoming surgery, procedure or cardioversion:  No    Anticoagulation Episode Summary     Current INR goal:      TTR:   54.1 % (1 y)   Next INR check:   5/4/2020   INR from last check:   2.70 (4/20/2020)   Goal at result date:   2.0-3.0   Weekly max warfarin dose:      Target end date:   Indefinite   INR check location:      Preferred lab:      Send INR reminders to:   [facility]    Indications    Other acute pulmonary embolism without acute cor pulmonale (H)  (Resolved) [I26.99]           Comments:   Goal range 2.0 - 2.5 per Dr. Cardona, 12/12/18         Anticoagulation Care Providers     Provider Role Specialty Phone number    Robin Catherine MD Referring Internal Medicine [phone]

## 2021-06-07 NOTE — TELEPHONE ENCOUNTER
ANTICOAGULATION  MANAGEMENT    Assessment     Today's INR result of 2.20 is Therapeutic (goal INR of 2.0 - 2.5)        Warfarin taken as previously instructed    No new diet changes affecting INR    No interaction expected between Amlodipine and warfarin    Continues to tolerate warfarin with no reported s/s of bleeding or thromboembolism     Previous INR was Supratherapeutic at 2.70 on 4/20/20.    Plan:     Spoke with Jaclyn regarding INR result and instructed:     Warfarin Dosing Instructions:    - Continue current warfarin dose 7.5 mg daily on Tuesdays; and 5 mg daily rest of week.    Instructed patient to follow up no later than:  2 wks.   - INR scheduled on 5/26/20 @ [facility].    Education provided: importance of consistent vitamin K intake, target INR goal and significance of current INR result and importance of notifying clinic for changes in medications    Jaclyn verbalizes understanding and agrees to warfarin dosing plan.    Instructed to call the [facility] for any changes, questions or concerns. (#[phone])   ?   Dory Montenegro RN    Subjective/Objective:      Jaclyn MEDINA Sai, a 87 y.o. female is on warfarin.     Jaclyn reports:     Home warfarin dose: as updated on anticoagulation calendar per template     Missed doses: No     Medication changes:  Yes:  On 4/24/20 Amlodipine dose decreased to 2.5mg daily.   - Jaclyn reported she has not take baby Aspirin for a long time.     S/S of bleeding or thromboembolism:  No     New Injury or illness:  No     Changes in diet or alcohol consumption:  No     Upcoming surgery, procedure or cardioversion:  No    Anticoagulation Episode Summary     Current INR goal:      TTR:   55.9 % (1 y)   Next INR check:   5/18/2020   INR from last check:   2.20 (5/4/2020)   Goal at result date:   2.0-3.0   Weekly max warfarin dose:      Target end date:   Indefinite   INR check location:      Preferred lab:      Send INR reminders to:   [facility]     Indications    Other acute pulmonary embolism without acute cor pulmonale (H) (Resolved) [I26.99]           Comments:   Goal range 2.0 - 2.5 per Dr. Cardona, 12/12/18         Anticoagulation Care Providers     Provider Role Specialty Phone number    Robin Catherine MD Referring Internal Medicine [phone]

## 2021-06-07 NOTE — TELEPHONE ENCOUNTER
ANTICOAGULATION  MANAGEMENT    Assessment     Today's INR result of 1.9 is Subtherapeutic (goal INR of 2.0-2.5)        Warfarin taken as previously instructed    possible diet changes, with less trips for groceries d/t COVID-19 diet is maybe a bit different    No new medication/supplements affecting INR    Continues to tolerate warfarin with no reported s/s of bleeding or thromboembolism     Previous INR was Therapeutic    Plan:     Spoke with Jaclyn regarding INR result and instructed:     Warfarin Dosing Instructions:  Continue current warfarin dose 7.5 mg daily on Mon; and 5 mg daily rest of week      Instructed patient to follow up no later than: 2 weeks    Education provided: importance of consistent vitamin K intake and target INR goal and significance of current INR result    Jaclyn verbalizes understanding and agrees to warfarin dosing plan.    Instructed to call the [facility] for any changes, questions or concerns. (#[phone])   ?   Ade Evans RN    Subjective/Objective:      Jaclyn Gibbs, a 87 y.o. female is on warfarin.     Jaclyn reports:     Home warfarin dose: as updated on anticoagulation calendar per template     Missed doses: No     Medication changes:  No     S/S of bleeding or thromboembolism:  No     New Injury or illness:  No     Changes in diet or alcohol consumption:  Maybe, dietary intake has been a bit different d/t COVID-19. Less trips to the store for groceries       Upcoming surgery, procedure or cardioversion:  No    Anticoagulation Episode Summary     Current INR goal:      TTR:   50.9 % (1 y)   Next INR check:   4/20/2020   INR from last check:   1.90! (4/6/2020)   Goal at result date:   2.0-3.0   Weekly max warfarin dose:      Target end date:   Indefinite   INR check location:      Preferred lab:      Send INR reminders to:   [facility], [place], operated by [facility]    Indications    Other acute pulmonary embolism without acute cor pulmonale (H) (Resolved) [I26.99]            Comments:   Goal range 2.0 - 2.5 per Dr. Cardona, 12/12/18         Anticoagulation Care Providers     Provider Role Specialty Phone number    Robin Catherine MD Referring Internal Medicine [phone]

## 2021-06-08 NOTE — TELEPHONE ENCOUNTER
"Phone call from patient asking to get another steroid injection for \"the sciatica pain I am having down around my ankle again. It started last week. I was so happy that I made it the 3 months. It is a 12/10 at its worst and I can't walk on it at all. Right now I am using the cane again. It is the same pain I have injection for all the other times.\" Chart reviewed. Patient has had 4 Left L5-S1 TFESI from 6/4/19-2/28/20. Explained she could be scheduled at this time as new 12 month time frame has started. Order placed in Epic. Injection requirements reviewed with patient. Stated understanding. She is aware she needs a mask at appointment,  has to stay in car, and aware of the decreased immune response from steroid. \"I'll take my chances.\"     Transferred to scheduling to make appointment.    "

## 2021-06-08 NOTE — PATIENT INSTRUCTIONS - HE
Please follow Thursday or Friday of this week for OMM with Dr Torres. Please follow up as needed or in two weeks if your pain fails to improve or worsens.       DISCHARGE INSTRUCTIONS    During office hours (8:00 a.m.- 4:00 p.m.) questions or concerns may be answered  by calling [facility] Nurses at  [phone].  Messages received after hours will be returned the following business day.      In the case of an emergency, please dial 911 or seek assistance at the nearest Emergency Room/Urgent Care facility.     All Patients:    ? You may experience an increase in your symptoms for the first 2 days (It may take anywhere between 2 days- 2 weeks for the steroid to have maximum effect).    ? You may use ice on the injection site, as frequently as 20 minutes each hour if needed.    ? You may take your pain medicine.    ? You may continue taking your regular medication after your injection. If you have had a Medial Branch Block you may resume pain medication once your pain diary is completed.    ? You may shower. No swimming, tub bath or hot tub for 48 hours.  You may remove your bandaid/bandage as soon as you are home.    ? You may resume light activities, as tolerated.    ? Resume your usual diet as tolerated.    ? It is strongly advised that you do not drive for 1-3 hours post injection.    ? If you have had oral sedation:  Do not drive for 8 hours post injection.      ? If you have had IV sedation:  Do not drive for 24 hours post injection.  Do not operate hazardous machinery or make important personal/business decisions for 24 hours.      POSSIBLE STEROID SIDE EFFECTS (If steroid/cortisone was used for your procedure)    -If you experience these symptoms, it should only last for a short period      Swelling of the legs                Skin redness (flushing)       Mouth (oral) irritation     Blood sugar (glucose) levels              Sweats                      Mood  changes    Headache    Sleeplessness         POSSIBLE PROCEDURE SIDE EFFECTS  -Call the [facility] Center if you are concerned    Increased Pain             Increased numbness/tingling        Nausea/Vomiting            Bruising/bleeding at site        Redness or swelling                                                Difficulty walking        Weakness             Fever greater than 100.5    *In the event of a severe headache after an epidural steroid injection that is relieved by lying down, please call the [facility] to speak with a clinical staff member*

## 2021-06-08 NOTE — PROGRESS NOTES
Office Visit - Follow Up   Jaclyn Gibbs   84 y.o. female    Date of Visit: 2/15/2017    Chief Complaint   Patient presents with     Follow-up     Fasting for labs.         Assessment and Plan   1. Neck stiffness  Complains of neck pains and stiffness off and on for a few days. Stiffness localized on the posterior neck. Will treat with cyclobenzaprine twice a day for a few days. To inform if this persists.   - cyclobenzaprine (FLEXERIL) 5 MG tablet; Take 1 tablet (5 mg total) by mouth 2 (two) times a day as needed for muscle spasms.  Dispense: 20 tablet; Refill: 0    2. Spinal stenosis of lumbar region with radiculopathy, L5 to S1  Found to have lumbar spine stenosis of L5 to S1. Has back pains and some leg weakness. These are aggravated by her scoliosis. Saw neurosurgery, Dr. Tariq then Dr. Aponte last year. Was advised physical therapy and transforaminal epidural injection. Injection worked better an gets relief of her pains for 3 months. Due to get her injection again through Dr. Kong of the spine clinic.     3. Lower extremity weakness  Has lower extremity weakness due to her spinal stenosis, deconditioning and previous stroke. Advised to use a cane to ambulate and prevent from falling. But she declines cane at this time. Reports she has not fallen and still able to ambulate well.     4. Hypertension  Controlled. Continue amlodipine, indapamide and losartan. Check cbc and bmp.   - HM2(CBC w/o Differential)  - Basic Metabolic Panel    5. Hyperlipidemia  Controlled without medications. Lipids in October 2016 were good. Check lipids, tsh and liver.   - Lipid Cascade  - Thyroid Stimulating Hormone (TSH)  - Hepatic Profile    6. Coronary Artery Disease  No chest pains and sob. Sees cardiology, Dr. Cardona once a year. Saw him in March 2016.due to see him for follow up.     7. Reviewed and discussed neurosurgery notes and recs. Reviewed [facility] clinic notes. Due for her cortisone epidural shot.  Will schedule this on her own.     Follow up in 3 months.      History of Present Illness   This 84 y.o. old female is here for follow up. Complains of neck stiffness and some pains. May have slept wrong. Able to move her neck without difficulties. No trauma or injury. Has hypertension controlled by her meds. Has hyperlipidemia controlled without need for medication. Has CAD and followed by cardiology. Sees her cardiologist once a year. Due to see cardiology next month. Has chronic back pains due to lumbar stenosis. Also has some lower extremity weakness. Denies falling. Ambulates fine. Has difficulty transferring due to her back pains. seen and evaluated by neurosurgery last year and was recommended conservative treatment of physical therapy and epidural cortisone injection. Underwent PT and cortisone injection and cortisone injection worked better. Was advised she can get cortisone injection every 2 1/2 or 3 months. Due to get her cortisone injection.      Review of Systems   A 12 point comprehensive review of systems was negative except as noted..     Medications, Allergies and Problem List   Reviewed and updated             Chief Complaint   Follow-up (Fasting for labs. )       Patient Profile   Social History     Social History Narrative        Past Medical History   Patient Active Problem List   Diagnosis     Venous Thrombosis Of The Deep Vessels Of The Lower Extremity     Benign Pigmented Nevus     Seborrheic Keratosis     Basal Cell Carcinoma Of The Skin Of The Face     Basal Cell Carcinoma Of The Skin     Lacunar Stroke     Obesity     Osteopenia     Decrease In Height     Hypertension     Coronary Artery Disease     Paroxysmal Supraventricular Tachycardia     Palpitations     Hyperlipidemia     Lower Back Pain     Spinal stenosis of lumbar region with radiculopathy, L5 to S1     Weakness of both lower extremities     Dyspnea     Back pain     GERD (gastroesophageal reflux disease)     Insomnia, unspecified  type     Lower extremity weakness       Past Surgical History  She has a past surgical history that includes arthroplasty tibial plateau; boggs w/o facetec foramot/dskc /2 vrt seg, cervical; Appendectomy;  section; ablate heart dysrhythm focus; Cardiac catheterization; and Coronary angioplasty.       Medications and Allergies   Current Outpatient Prescriptions   Medication Sig     amLODIPine (NORVASC) 5 MG tablet Take 0.5 tablets (2.5 mg total) by mouth daily.     aspirin 81 MG EC tablet Take 81 mg by mouth daily.     CALCIUM CARB/MAGNESIUM OXID/D3 (CALCIUM MAGNESIUM + D ORAL) Take by mouth 2 (two) times a day. 750/300/500mg     fluocinolone-emol cmb#65 0.025 % Crea Apply 1 Dose topically 2 (two) times a day as needed.     GLUCOSAMINE/CHONDROITIN SULF A (GLUCOSAMINE-CHONDROITIN ORAL) Take 2 tablets by mouth daily.     indapamide (LOZOL) 2.5 MG tablet TAKE 1 TABLET EVERY DAY     losartan (COZAAR) 100 MG tablet Take 1 tablet (100 mg total) by mouth daily.     lutein 20 mg cap Take 1 capsule by mouth daily.     multivitamin (ONE A DAY) per tablet Take 1 tablet by mouth daily.     naproxen sodium (ALEVE) 220 MG tablet Take 220 mg by mouth 2 (two) times a day.     nitroglycerin (NITROSTAT) 0.4 MG SL tablet Place 0.4 mg under the tongue as needed for chest pain (Keep in original bottle).     OMEGA-3 FATTY ACIDS/FISH OIL (FISH OIL EXTRA STRENGTH ORAL) Take 1,470 mg by mouth daily.     omeprazole (PRILOSEC) 20 MG capsule Take 20 mg by mouth daily.     tolterodine (DETROL LA) 4 MG ER capsule TAKE ONE CAPSULE (4MG) BY MOUTH AT BEDTIME     cyclobenzaprine (FLEXERIL) 5 MG tablet Take 1 tablet (5 mg total) by mouth 2 (two) times a day as needed for muscle spasms.     Allergies   Allergen Reactions     Codeine      Diazepam Nausea Only     Morphine         Family and Social History   Family History   Problem Relation Age of Onset     Stroke Mother      Hypertension Father      Sudden death Father         Social History  "  Substance Use Topics     Smoking status: Never Smoker     Smokeless tobacco: Never Used     Alcohol use No        Physical Exam       Physical Exam  Visit Vitals     /78     Pulse 80     Resp 18     Ht 5' 3.5\" (1.613 m)     Wt 159 lb (72.1 kg)     Breastfeeding No     BMI 27.72 kg/m2     General appearance: alert, appears stated age, cooperative and no distress  Head: Normocephalic, without obvious abnormality, atraumatic  Throat: lips, mucosa, and tongue normal; teeth and gums normal  Neck: no adenopathy, no carotid bruit, no JVD, supple, symmetrical, trachea midline and thyroid not enlarged, symmetric, no tenderness/mass/nodules  Lungs: clear to auscultation bilaterally  Heart: regular rate and rhythm, S1, S2 normal, no murmur, click, rub or gallop  Abdomen: soft, non-tender; bowel sounds normal; no masses,  no organomegaly  Extremities: extremities normal, atraumatic, no cyanosis or edema  Skin: Skin color, texture, turgor normal. No rashes or lesions  Neurologic: Alert and oriented X 3, normal strength and tone. Normal symmetric reflexes. Normal coordination and gait  Musculoskeletal: tender posterior neck but normal ROM, stiff muscle, tender low back     Additional Information        Pancho Banks MD  Internal Medicine  Contact me at [phone]     Additional Information   Current Outpatient Prescriptions   Medication Sig     amLODIPine (NORVASC) 5 MG tablet Take 0.5 tablets (2.5 mg total) by mouth daily.     aspirin 81 MG EC tablet Take 81 mg by mouth daily.     CALCIUM CARB/MAGNESIUM OXID/D3 (CALCIUM MAGNESIUM + D ORAL) Take by mouth 2 (two) times a day. 750/300/500mg     fluocinolone-emol cmb#65 0.025 % Crea Apply 1 Dose topically 2 (two) times a day as needed.     GLUCOSAMINE/CHONDROITIN SULF A (GLUCOSAMINE-CHONDROITIN ORAL) Take 2 tablets by mouth daily.     indapamide (LOZOL) 2.5 MG tablet TAKE 1 TABLET EVERY DAY     losartan (COZAAR) 100 MG tablet Take 1 tablet (100 mg total) by mouth " daily.     lutein 20 mg cap Take 1 capsule by mouth daily.     multivitamin (ONE A DAY) per tablet Take 1 tablet by mouth daily.     naproxen sodium (ALEVE) 220 MG tablet Take 220 mg by mouth 2 (two) times a day.     nitroglycerin (NITROSTAT) 0.4 MG SL tablet Place 0.4 mg under the tongue as needed for chest pain (Keep in original bottle).     OMEGA-3 FATTY ACIDS/FISH OIL (FISH OIL EXTRA STRENGTH ORAL) Take 1,470 mg by mouth daily.     omeprazole (PRILOSEC) 20 MG capsule Take 20 mg by mouth daily.     tolterodine (DETROL LA) 4 MG ER capsule TAKE ONE CAPSULE (4MG) BY MOUTH AT BEDTIME     cyclobenzaprine (FLEXERIL) 5 MG tablet Take 1 tablet (5 mg total) by mouth 2 (two) times a day as needed for muscle spasms.     Allergies   Allergen Reactions     Codeine      Diazepam Nausea Only     Morphine      Social History   Substance Use Topics     Smoking status: Never Smoker     Smokeless tobacco: Never Used     Alcohol use No         Time: total time spent with the patient was 40 minutes of which >50% was spent in counseling and coordination of care

## 2021-06-08 NOTE — TELEPHONE ENCOUNTER
ANTICOAGULATION  MANAGEMENT PROGRAM    Jaclyn Gibbs is overdue for INR check.     Spoke with Jaclyn and scheduled INR appointment on 6/3/20 @ [facility].      Dory Montenegro RN

## 2021-06-08 NOTE — TELEPHONE ENCOUNTER
ANTICOAGULATION  MANAGEMENT    Assessment     Today's INR result of 2.60 is Supratherapeutic (goal INR of 2.0 - 2.5)     - [facility]      Warfarin taken as previously instructed    No new diet changes affecting INR    Potential interaction between Methylprednisolone injection and warfarin which may affect subsequent INRs    Continues to tolerate warfarin with no reported s/s of bleeding or thromboembolism     Previous INR was Therapeutic at 2.50 on 6/8/20.    Post TF-LEO today @ [facility].    Discussed dose with Sandie Galaviz, PharmD.    Plan:     Spoke with Jaclyn regarding INR result and instructed:     Warfarin Dosing Instructions:  (has 5mg tabs)   - Continue current warfarin dose 7.5 mg daily on Mondays; and 5 mg daily rest of week.    Instructed patient to follow up no later than:  One wk.   - INR scheduled on 6/23/20 @ [facility]    Education provided: target INR goal and significance of current INR result, potential interaction between warfarin and Methylprednisone injection and monitoring for bleeding signs and symptoms    Jaclyn verbalizes understanding and agrees to warfarin dosing plan.    Instructed to call the [facility] for any changes, questions or concerns. (#[phone])   ?   Dory Montenegro RN    Subjective/Objective:      Jaclyn Gibsb, a 87 y.o. female is on warfarin.     Jaclyn reports:     Home warfarin dose: verbally confirmed home dose with Jaclyn and updated on anticoagulation calendar     Missed doses: No     Medication changes:  Yes:  Post LEO injection with Methylprednisolone injection 6/16.     S/S of bleeding or thromboembolism:  No     New Injury or illness:  No     Changes in diet or alcohol consumption:  No     Upcoming surgery, procedure or cardioversion:  No    Anticoagulation Episode Summary     Current INR goal:      TTR:   49.4 % (1 y)   Next INR check:   6/23/2020   INR from last check:   2.60 (6/16/2020)   Goal at result date:   2.0-3.0   Weekly max  warfarin dose:      Target end date:   Indefinite   INR check location:      Preferred lab:      Send INR reminders to:   [facility]    Indications    Other acute pulmonary embolism without acute cor pulmonale (H) (Resolved) [I26.99]           Comments:   Goal range 2.0 - 2.5 per Dr. Cardona, 12/12/18         Anticoagulation Care Providers     Provider Role Specialty Phone number    Robin Catherine MD Referring Internal Medicine [phone]

## 2021-06-08 NOTE — TELEPHONE ENCOUNTER
ANTICOAGULATION  MANAGEMENT    Assessment     Today's INR result of 1.50 is Subtherapeutic (goal INR of 2.0 - 2.5)        Less warfarin taken than instructed which may be affecting INR    No new diet changes affecting INR    No new medication/supplements affecting INR    Continues to tolerate warfarin with YES reported s/s of bleeding.   - reported on template occasional rectal bleeding after bowel movement. None reported today.  However, she meant loose bowel movement / diarrhea.  No bleeding.    Previous INR was Therapeutic at 2.20 on 5/4/20.    Scheduled for TF-LEO of lumbar sacral spine (unilateral) on 6/16/20.  Complained of back and sciatia pains w/ radiation down to LE.    Reported not feeling    Consult pharmacist, Sandie Glaaviz, PharmD    Plan:     Spoke with Jaclyn regarding INR result and instructed:    1. Called [facility] ([phone]) and talked with Leo and she verified there is no stoppage of warfarin.  INR will be tested prior to injection and INR needs to be 3.00 or less.  Greater than 3.0, injection will need to be rescheduled.   2.  Ensure to take correct warfarin dose, as instructed.    Warfarin Dosing Instructions:  (has 5mg tabs)   - Change warfarin dose to 7.5 mg daily on Mondays; and 5 mg daily rest of week.    Instructed patient to follow up no later than:  1-2 wks.    Education provided: target INR goal and significance of current INR result, importance of taking warfarin as instructed, when to seek medical attention/emergency care and importance of notifying clinic for diarrhea, nausea/vomiting, reduced intake and/or illness    Jaclyn verbalizes understanding and agrees to warfarin dosing plan.    Instructed to call the [facility] for any changes, questions or concerns. (#[phone])   ?   Dory Montenegro RN    Subjective/Objective:      Jaclyn Gibbs, a 87 y.o. female is on warfarin.     Jaclyn reports:     Home warfarin dose: template incorrect; verbally confirmed  home dose with Jaclyn and updated on anticoagulation calendar     Missed doses: No.  However, has been taking less warfarin dose, as instructed.     Medication changes:  No     S/S of bleeding or thromboembolism:  Yes: reported occasional rectal bleed after loose bowel movement.  None reported today.  However, Jaclyn meant loose bowel movement / diarrhea, not rectal bleeding.     New Injury or illness:  Yes: reported not feeling well and relates it to her advancing age.     Changes in diet or alcohol consumption:  No    Upcoming surgery, procedure or cardioversion:  Yes: Scheduled for TF-LEO of lumbar sacral spine (unilateral) on 6/16/20.      Anticoagulation Episode Summary     Current INR goal:      TTR:   50.3 % (1 y)   Next INR check:   6/22/2020   INR from last check:   1.50! (6/8/2020)   Goal at result date:   2.0-3.0   Weekly max warfarin dose:      Target end date:   Indefinite   INR check location:      Preferred lab:      Send INR reminders to:   [facility]    Indications    Other acute pulmonary embolism without acute cor pulmonale (H) (Resolved) [I26.99]           Comments:   Goal range 2.0 - 2.5 per Dr. Cardona, 12/12/18         Anticoagulation Care Providers     Provider Role Specialty Phone number    Robin Catherine MD Referring Internal Medicine [phone]

## 2021-06-08 NOTE — PROGRESS NOTES
Assessment:   Jaclyn Gibbs is a 87 y.o. y.o. female with past medical history significant for pulmonary embolism (on Coumadin), coronary artery disease, hyperlipidemia, hypertension, osteopenia who presents today for IN PERSON follow-up regarding chronic left-sided neck pain without radicular arm symptoms.  Symptoms have been present for the last year and worsening over the last 3 to 4 months.  Her neck pain is thought to be secondary to cervical facet joint syndrome given her cervical facet arthropathy/ankylosis seen on multiple areas.  There is also thought to be an osteopathic somatic dysfunction component.  She is neurologically intact without any red flag or myelopathic signs/symptoms.    Of note she is status post a left L5-S1 transforaminal epidural steroid injection on June 16 of 2020 to treat left radicular leg pain from significant left L5-S1 foraminal stenosis seen on her MRI.  At this time, she is reporting 100% relief of her leg pain following that injection..      PSP:  Dr. Torres     Plan:     A shared decision making plan was used.  The patient's values and choices were respected.  The following represents what was discussed and decided upon by the physician and the patient.      1.  DIAGNOSTIC TESTS: The patient's MRI of the cervical spine from August 2019 was personally reviewed today by the physician with the physician performing her own interpretation (this was performed at [facility]).  She does have severe facet arthropathy seen at the C2-3 level with significant central canal stenosis and severe bilateral C2-3 foraminal stenosis.  At the C3-4 level she has ankylosis of the right C3-4 facet joint.  There is severe bilateral C3-4 neuroforaminal stenosis.  At the C4-5 level, she does have central canal stenosis of bilateral (right greater than left) facet arthropathy and severe bilateral C 4 5 foraminal stenosis.  At the C5-6 level, she does have ankylosis of the bilateral facet joints with  bilateral foraminal stenosis.  At the C6-7 level, she does have central canal stenosis with mild left facet arthropathy.  She has severe left C6-7 foraminal stenosis and moderate right C6-foraminal stenosis.  At the C7-T1 level there is mild facet arthropathy with mild to moderate right neuroforaminal stenosis and mild left foraminal stenosis.  -No further diagnostic tests necessary at this time.  2.  PHYSICAL THERAPY: At the patient's last visit on March 12 of 2020, she was given another referral for physical therapy to work specifically on the neck.  She did not start physical therapy.  Another order was provided today.  She prefers to go to the [facility] rehab.  3.  MEDICATIONS:    -She can continue to take over-the-counter Tylenol 100-1000 mg up to 3-6 times per day.  She should not exceed 3000 mg of Tylenol in a 24-hour period.  4.  INTERVENTIONS:  Osteopathic manipulation was performed to 7 areas today.  The patient tolerated the treatment well but did report some mild soreness afterwards.   Please see below for the osteopathic manipulation that was performed today.  5.  PATIENT EDUCATION:    - The patient was advised to rest today and drink plenty of water.  Normal activities can be resumed as tolerated tomorrow.    - The patient was told that soreness following the treatment is normal and should improve within a few days.  Ice should be used (as opposed to heat) if soreness occurs.  If the soreness is concerning, the clinic should be notified so further instructions can be given.  -Encouraged her to do some gentle range of motion exercises (flexion, extension, sidebending, rotation).  6.  FOLLOW-UP: The patient is asked to follow-up in approximately 1 to 2 weeks with Dr. Torres for reevaluation and potentially further osteopath manipulation.. If there are any questions/concerns or any significant worsening of pain prior to that time, the patient is asked to call the clinic via the nurse navigation  line or via [facility].     Subjective:     Jaclyn Gibbs is a 87 y.o. female who presents today for follow-up regarding left-sided neck pain.  The patient reports that she has had this pain for quite some time but it is worsened over the last few months.  She just has pain in the left side of her neck, no pain in the right side.  She denies any radiation of pain going down into the arms.  No numbness tingling or weakness in the arms.  Her pain is worse with any type of movement of her neck, particularly rotation or side bending.  She does feel better with taking Tylenol, though she states that it just dulls the pain, and does not completely relieve it.  She is currently rating her pain today at a 6 out of 10.  At worst it is a 9 out of 10.  At best it is a 0 out of 10.  She has not started physical therapy for the neck.  She is interested in trying osteopathic manipulation for the neck today.    Past medical history is reviewed and is unchanged in the interim.    Family history is reviewed and is unchanged in the interim.    Review of Systems:.  Pertinent negatives include no fevers, chills, unexplained weight loss, bowel incontinence, bladder incontinence, trips, stumbles, falls.  All others reviewed and are negative.     Objective:   CONSTITUTIONAL:  Vital signs as above.  No acute distress.  The patient is well nourished and well groomed.    PSYCHIATRIC:  The patient is awake, alert, oriented to person, place and time.  The patient is answering questions appropriately with clear speech.  Normal affect.  SKIN:  Skin over the face, posterior torso, bilateral upper and lower extremities is clean, dry, intact without rashes.  MUSCULOSKELETAL:  Gait is non-antalgic.  The patient is able to transfer independently.  She has 5/5 strength of the bilateral shoulder abductors, elbow flexors/extensors, wrist extensors, finger flexors/abductors.  NEURO:  2/4 biceps, brachioradialis, triceps reflexes bilaterally.   Negative María's bilaterally.  Sensation light touch is intact in all of the digits of both upper extremities.    OSTEOPATHIC STRUCTURAL EXAM:  Positive standing flexion test on the right.  Right inferior iliac crest.  Lumbar spine: L3-4 flexed, rotated side side bent left  Sacrum: Left unilateral sacral flexion  Innominates/Pelvis: Right down slipped innominate, anterior/inferior right  Thoracic spine: T10-12 flexed, rotated left side bent left  Rib cage: First rib elevated on the left  Cervical spine: C5-7 flexed, rotated left side bent right,  C2-3 flexed, rotated left side bent left  Head/OA joint: Side bent right/rotated left    OMT:  TREATMENTS IN PARENTHESES  Lumbar spine: L3-4 flexed, rotated side side bent left (Myofascial release, patient prone).  Sacrum: Left unilateral sacral flexion (Myofascial release, patient prone).  Innominates/Pelvis: Right down slipped innominate (Myofascial release with a respiratory component, patient prone)., anterior/inferior right  (Muscle energy with the patient supine).  Thoracic spine: T10-12 flexed, rotated left side bent left (Muscle energy, patient seated)  Rib cage: First rib elevated on the left (Myofascial release, patient supine)  Cervical spine: C5-7 flexed, rotated left side bent right,  C2-3 flexed, rotated left side bent left  (Muscle energy with the patient supine).  Head/OA joint: Side bent right/rotated left  (Muscle energy with the patient supine).

## 2021-06-09 NOTE — TELEPHONE ENCOUNTER
ANTICOAGULATION  MANAGEMENT PROGRAM    Jaclyn Gibbs is overdue for INR check.     Spoke with Jaclyn and scheduled INR appointment on 6/29/20 @ [facility].      Dory Montenegro RN

## 2021-06-09 NOTE — TELEPHONE ENCOUNTER
Provider Review: Anticoagulation Annual Referral Renewal    ACM Renewal Decision:  Renew [facility] warfarin management      INR Range:   Continue management at current INR goal   Anticipated Duration of Therapy (from today):  Long-term anticoagulation      Pancho Banks MD  7:44 AM

## 2021-06-09 NOTE — TELEPHONE ENCOUNTER
ANTICOAGULATION  MANAGEMENT    Assessment     Today's INR result of 2.10 is Therapeutic (goal INR of 2.0 - 2.5)        Warfarin taken as previously instructed    No new diet changes affecting INR    Potential interaction between Methylprednisone injection and warfarin which may affect subsequent INRs    Continues to tolerate warfarin with no reported s/s of bleeding or thromboembolism     Previous INR was Supratherapeutic at 2.60 on 6/16/20.    [facility]    Plan:     Spoke with Jaclyn regarding INR result and instructed:     Warfarin Dosing Instructions:    - Continue current warfarin dose 7.5 mg daily on Mondays; and 5 mg daily rest of week.    Instructed patient to follow up no later than:  2 wks.   - INR scheduled on 7/13/20 during PT @ [facility].    Education provided: target INR goal and significance of current INR result    Jaclyn verbalizes understanding and agrees to warfarin dosing plan.    Instructed to call the [facility] for any changes, questions or concerns. (#[phone])   ?   Dory Montenegro RN    Subjective/Objective:      Jaclyn Gibbs, a 87 y.o. female is on warfarin.     Jaclyn reports:     Home warfarin dose: as updated on anticoagulation calendar per template     Missed doses: No     Medication changes:  No     S/S of bleeding or thromboembolism:  No     New Injury or illness:  No     Changes in diet or alcohol consumption:  No     Upcoming surgery, procedure or cardioversion:  No    Anticoagulation Episode Summary     Current INR goal:      TTR:   49.4 % (1 y)   Next INR check:   7/13/2020   INR from last check:   2.10 (6/29/2020)   Goal at result date:   2.0-3.0   Weekly max warfarin dose:      Target end date:   Indefinite   INR check location:      Preferred lab:      [facility]    Indications    Other acute pulmonary embolism without acute cor pulmonale (H) (Resolved) [I26.99]           Comments:   Goal  range 2.0 - 2.5 per Dr. Cardona, 12/12/18         Anticoagulation Care Providers     Provider Role Specialty Phone number    Robin Catherine MD Referring Internal Medicine [phone]

## 2021-06-09 NOTE — TELEPHONE ENCOUNTER
ANTICOAGULATION  MANAGEMENT    Assessment     Today's INR result of 1.90 is Subtherapeutic (goal INR of 2.0 - 2.5)        Warfarin taken as previously instructed    No new diet changes affecting INR    No new medication/supplements affecting INR    Continues to tolerate warfarin with no reported s/s of bleeding or thromboembolism     Previous INR was Therapeutic at 2.10 on 6/29/20    S/p unilateral lumbosacral (Methylprednisolone) injection on 6/16/20.    Consulted pharmacist - Sandie Galaviz, PharmD due to non-standard INR goal    Plan:     Spoke with Jaclyn regarding INR result and instructed:     Warfarin Dosing Instructions:  (evenings. Has 5mg tabs)   - Continue current warfarin dose 7.5 mg daily on Mondays; and 5 mg daily rest of week.    Instructed patient to follow up no later than:  1-2 wks.   - INR scheduled on7/27/20 @ [facility].    Education provided: target INR goal and significance of current INR result, potential interaction between warfarin and Methylprednisolone injection of LS and importance of notifying clinic for changes in medications    Jaclyn verbalizes understanding and agrees to warfarin dosing plan.    Instructed to call the [facility] for any changes, questions or concerns. (#[phone])   ?   Dory Montenegro RN    Subjective/Objective:      Jaclyn Gibbs, a 87 y.o. female is on warfarin.     Jaclyn reports:     Home warfarin dose: as updated on anticoagulation calendar per template     Missed doses: No     Medication changes:  Yes:  Post Methylprednisolone injection of lumbosacral on 6/16/20.     S/S of bleeding or thromboembolism:  No     New Injury or illness:  No     Changes in diet or alcohol consumption:  No     Upcoming surgery, procedure or cardioversion:  No    Anticoagulation Episode Summary     Current INR goal:      TTR:   51.0 % (1 y)   Next INR check:   7/27/2020   INR from last check:   1.90! (7/13/2020)   Goal at result date:   2.0-3.0   Weekly max warfarin dose:       Target end date:   Indefinite   INR check location:      Preferred lab:      Send INR reminders to:   [facility]    Indications    Other acute pulmonary embolism without acute cor pulmonale (H) (Resolved) [I26.99]           Comments:   Goal range 2.0 - 2.5 per Dr. Cardona, 12/12/18         Anticoagulation Care Providers     Provider Role Specialty Phone number    Robin Catherine MD Referring Internal Medicine [phone]

## 2021-06-09 NOTE — TELEPHONE ENCOUNTER
"Anticoagulation Annual Referral Renewal Review    Jaclyn Gibbs's chart reviewed for annual renewal of referral to anticoagulation monitoring.        Criteria for anticoagulation nurse and/or pharmacist renewal met   Warfarin indication: DVT / PE / stroke Yes , DVT/PE with previous provider documentation patient to be on extended anticoagulation   Current with INR monitoring/compliant Yes Yes   Date of last office visit 1/27/2020 Yes, had office visit within last year   Time in Therapeutic Range (TTR) 49.4 % No, TTR < 60 %       Jaclyn Gibbs did NOT meet all criteria for anticoagulation management program initiated renewal and requires provider review. Using dot phrase, \".acmrenewalprovider\", please advise if Jaclyn's anticoagulation management referral should be renewed or if patient should be seen in office to review anticoagulation therapy      Dory Montenegro RN  11:50 AM      "

## 2021-06-09 NOTE — TELEPHONE ENCOUNTER
Patient NS #1, PT called and informed patient of the NS and reminded her of the next scheduled appointment.

## 2021-06-09 NOTE — PATIENT INSTRUCTIONS - HE
Please rest today and drink plenty of water.      It is normal to have soreness following the treatment.  Please use ice over the sore areas.  You may take your usual pain medications.  Please call the clinic at [phone] or contact Dr. Torres via [email or web address] if the soreness is concerning to you.      Please follow-up in approximately 1-2 weeks.

## 2021-06-09 NOTE — PROGRESS NOTES
[facility]   Cervical Thoracic Initial Evaluation    Patient Name: Jaclyn Gibbs  Date of evaluation: 6/22/2020  Referral Diagnosis: cervical pain  Referring provider: Florin Frank*  Visit Diagnosis:     ICD-10-CM    1. Cervicalgia  M54.2    2. Myofascial pain  M79.18        Assessment:      Jaclyn Gibbs is a 87 y.o. female who presents to PT today with cervical pain. This could very well be from facet arthropathy or OA in her cervical spine. She does have scoliosis which will also contribute to her pain.  She is limited with rotation to the left and some with looking up/down. She is appropriate for skilled PT to allow her to reach all stated goals.     Goals:  Pt. will demonstrate/verbalize independence in self-management of condition in : 6 weeks  Pt. will report decreased intensity, frequency of : Pain;in 6 weeks;Comment  Comment:: decrease pain from 0-8/10 to 0-3/10 with ADLs.  Patient Turn Head: for driving;for watching traffic;for conversation;with full ROM;with less pain;with less difficulty;in 12 weeks  Patient will look up / down: for reading;for computer work;with full ROM;with no pain;in 6 weeks    Patient will decrease : NDI score;by _ points;for improved quality of function;for improved quality of life;in 12 weeks  by ___ points: 15      Patient's expectations/goals are realistic.    Barriers to Learning or Achieving Goals:  Chronicity of the problem.       Plan / Patient Instructions:        Plan of Care:   Communication with: Referral Source  Patient Related Instruction: Nature of Condition;Treatment plan and rationale;Basis of treatment;Self Care instruction;Body mechanics;Posture;Precautions;Next steps;Expected outcome  Times per Week: 1  Number of Weeks: 8  Number of Visits: 8  Discharge Planning: discahrge to [facility] once goals are met.  Therapeutic Exercise: ROM;Stretching;Strengthening  Neuromuscular Reeducation:  posture;balance/proprioception;TNE;core  Manual Therapy: soft tissue mobilization;myofascial release;joint mobilization;muscle energy;strain counterstrain      Plan for next visit: initiate manual therapy and initiate/progress ex as tolerated.      Subjective:         Social information:   Occupation:retired   Work Status:NA      History of Present Illness:    Jaclyn is a 87 y.o. female who presents to therapy today with complaints of L sided neck pain. This has been bothering her for over a year or so. It will give her a HA at times and does currently. The HA is across her forehead.   The neck pain has just sort of been the same over the last year. She doesn't know of any particular reason why the pain started but was told that she has arthritis in her neck. There are some days where she is miserable from the neck pain.   She does not have radiating symptoms into her UE's.   She does take Tylenol daily and this really does help her.    When seh sleeps she does use a pillow as a good neck support and really like that.   Function: turning her head is painful, she doesn't drive much due to the inability to turn her head, she doesn't wake up from pain - wakes with no pain usually, looking up and down does seem to be ok and looking to the R seems ok, lifting/carrying is only difficult if it puts a strain on her neck  She describes their previous level of function as not limited    Pain Ratin  Pain rating at best: 0 when not moving  Pain rating at worst: 8  Pain description: pain      Patient reports benefit from:  pain medication         Objective:      Note: Items left blank indicates the item was not performed or not indicated at the time of the evaluation.    Patient Outcome Measures :    Neck Disability Score in %: 26     Scores range from 0-100%, where a score of 0% represents minimal pain and maximal function. The minmal clinically important difference is a score reduction of 10%.    Cervical Thoracic  Examination  1. Cervicalgia     2. Myofascial pain       Precautions/Restrictions: None  Involved side: Left  Posture Observation:      Standing Posture: convex R thor and L lumbar scoliosis, high R shoulder.      Seated Posture:     Cervical ROM:  6/22/2020   Date:      *Indicate scale AROM AROM AROM   Cervical Flexion 45     Cervical Extension 20       Right Left Right Left Right Left   Cervical Sidebending         Cervical Rotation 55 40 pain in L upper trap       Cervical Protraction      Cervical Retraction      Thoracic Flexion      Thoracic Extension      Thoracic Sidebending         Thoracic Rotation           Strength   6/22/2020   Date:      Cervical Myotomes/5 Right Left Right Left Right Left   Cervical Flexion (C1-2)         Cervical Sidebending (C3)         Shoulder Elevation (C4) 4- 4-       Shoulder Abduction (C5) 4 4       Elbow Flexion (C6) 4 4       Elbow Extension (C7) 4 4       Wrist Flexion (C7)         Wrist Extension (C6)         Thumb abduction (C8)         Finger Abduction (T1)             Flexibility:    Palpation: Hypomobile fascia of spine. Upper traps and levator scap on L are point tender and have increased tone.     UE Screen: ROM: Flexion: WFL          Abduction: WFL        Treatment Today   6/22/2020   TREATMENT MINUTES COMMENTS   Evaluation 40 low   Self-care/ Home management     Manual therapy NC Fascial release using Strain-Counterstrain of L UEC6-MS, LV of upper trap/levator scap/cervcial spine   Neuromuscular Re-education     Therapeutic Activity     Therapeutic Exercises 5 Upper trap/levator scap stretches   Gait training     Modality__________________                Total 45    Blank areas are intentional and mean the treatment did not include these items.       PT Evaluation Code: (Please list factors)  Patient History/Comorbidities: 1-2  Examination: 1-2  Clinical Presentation: stable  Clinical Decision Making: low    Patient History/  Comorbidities Examination  (body  structures and functions, activity limitations, and/or participation restrictions) Clinical Presentation Clinical Decision Making (Complexity)   No documented Comorbidities or personal factors 1-2 Elements Stable and/or uncomplicated Low   1-2 documented comorbidities or personal factor 3 Elements Evolving clinical presentation with changing characteristics Moderate   3-4 documented comorbidities or personal factors 4 or more Unstable and unpredictable High                Hong Hutchins PT, ATC  6/22/2020  12:03 PM

## 2021-06-10 NOTE — TELEPHONE ENCOUNTER
ANTICOAGULATION  MANAGEMENT    Assessment     Today's INR result of 1.40 is Subtherapeutic (goal INR of 2.0 - 2.5)        Warfarin taken as previously instructed     No new diet changes affecting INR    - reported she has not felt well.    No new medication/supplements affecting INR    Continues to tolerate warfarin with no reported s/s of bleeding or thromboembolism     Previous INR was Subtherapeutic at 1.90 on 7/13/20.    Will consult pharmacist - Good RogerD, due to non-standard INR goal.    NOTE:  Jaclyn reported transferring care closer to home - [facility] in [place].  Getting difficult to drive [facility].  I advised her to establish care with her new PCP and they can also check her INR's at this location, since we receive orders from Dr. Banks.     Plan:     Spoke on phone with Jaclyn regarding INR result and instructed:    1.  Advised f/u with Dr. Banks.  However, Jaclyn reported, she see's a new doctor now @ [facility], since it is closer to home.  Advised her call her new clinic to ensure she establishes care with the doctor and INR can be f/u with her current physician.     Warfarin Dosing Instructions:  (evenings. Has 5mg tabs)   - reported taking 5mg warfarin dose only last night.   - Change warfarin dose to 7.5 mg daily on Tues/Fri; and 5 mg daily rest of week.   - (6.7 % change)    Instructed patient to follow up no later than:  5-7 days.    Education provided: importance of consistent vitamin K intake, target INR goal and significance of current INR result and importance of notifying clinic for diarrhea, nausea/vomiting, reduced intake and/or illness    Jaclyn verbalizes understanding and agrees to warfarin dosing plan.    Instructed to call the [facility] for any changes, questions or concerns. (#[phone])   ?   Dory Montenegro RN    Subjective/Objective:      Jaclyn Collinsefrain, a 87 y.o. female is on warfarin.     Jaclyn reports:     Home warfarin dose: as updated  on anticoagulation calendar per template     Missed doses: No     Medication changes:  No     S/S of bleeding or thromboembolism:  No     New Injury or illness:  No     Changes in diet or alcohol consumption:  No     Upcoming surgery, procedure or cardioversion:  No    Anticoagulation Episode Summary     Current INR goal:      TTR:   47.7 % (1 y)   Next INR check:   8/18/2020   INR from last check:   1.40! (8/11/2020)   Goal at result date:   2.0-3.0   Weekly max warfarin dose:      Target end date:   Indefinite   INR check location:      Preferred lab:      Send INR reminders to:   [facility]    Indications    Other acute pulmonary embolism without acute cor pulmonale (H) (Resolved) [I26.99]           Comments:   Goal range 2.0 - 2.5 per Dr. Cardona, 12/12/18         Anticoagulation Care Providers     Provider Role Specialty Phone number    Robin Catherine MD Referring Internal Medicine [phone]

## 2021-06-10 NOTE — PROGRESS NOTES
[facility] Follow-up Note    Assessment & Plan        1. Coronary atherosclerosis due to lipid rich plaque -recent to 2016 showed normal left main, calcification of the proximal mid left and descending but no significant disease, normal circumflex, and normal right coronary artery.  I renewed nitroglycerin for vasospasm today but continue current medications.     2. Hypercholesteremia -cholesterol 204 with an LDL of 116 and without any significant obstructive coronary artery disease or myocardial infarction in the past agree given her age not to start statin.     3. 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  ECG today shows occasional PACs but no alarming arrhythmias.     4. Essential hypertension with goal blood pressure less than 140/90 -blood pressure under good control currently.     5. Spinal stenosis of lumbar region with radiculopathy, L5 to S1 -she has left lower extremity radiculopathy and will be speaking with neurosurgery about potential surgery there.     6.  Shortness of breath- this more of a sensation needing to take a deep breath and is not associated with activity.    Plan  1.  Speak with primary neurosurgery about back issues.  2.  Follow-up with me in 1 year or sooner if she has increased palpitations.    Subjective  CC: 84-year-old white female being seen in yearly follow-up today.  She still has need to take a deep breath occasionally when she is lying in bed at night.  She feels an occasional skipped heartbeat.  Otherwise there is no sustained palpitations, PND, orthopnea, chest discomfort, syncope or major dizziness.    Medications  Current Outpatient Prescriptions   Medication Sig     amLODIPine (NORVASC) 5 MG tablet Take 0.5 tablets (2.5 mg total) by mouth daily.     aspirin 81 MG EC tablet Take 81 mg by mouth daily.     CALCIUM  "CARB/MAGNESIUM OXID/D3 (CALCIUM MAGNESIUM + D ORAL) Take by mouth 2 (two) times a day. 750/300/500mg     fluocinolone-emol cmb#65 0.025 % Crea Apply 1 Dose topically 2 (two) times a day as needed.     GLUCOSAMINE/CHONDROITIN SULF A (GLUCOSAMINE-CHONDROITIN ORAL) Take 2 tablets by mouth daily.     indapamide (LOZOL) 2.5 MG tablet TAKE 1 TABLET EVERY DAY     losartan (COZAAR) 100 MG tablet Take 1 tablet (100 mg total) by mouth daily.     lutein 20 mg cap Take 1 capsule by mouth daily.     multivitamin (ONE A DAY) per tablet Take 1 tablet by mouth daily.     naproxen sodium (ALEVE) 220 MG tablet Take 220 mg by mouth 2 (two) times a day.     nitroglycerin (NITROSTAT) 0.4 MG SL tablet Place 1 tablet (0.4 mg total) under the tongue as needed for chest pain (Keep in original bottle).     OMEGA-3 FATTY ACIDS/FISH OIL (FISH OIL EXTRA STRENGTH ORAL) Take 1,470 mg by mouth daily.     omeprazole (PRILOSEC) 20 MG capsule Take 20 mg by mouth daily.     tolterodine (DETROL LA) 4 MG ER capsule TAKE ONE CAPSULE (4MG) BY MOUTH AT BEDTIME       Objective  /82 (Patient Site: Left Arm, Patient Position: Sitting, Cuff Size: Adult Large)  Pulse 90  Resp 18  Ht 5' 3\" (1.6 m)  Wt 164 lb (74.4 kg)  BMI 29.05 kg/m2    General Appearance:    Alert, cooperative, no distress, appears stated age   Head:    Normocephalic, without obvious abnormality, atraumatic   Throat:   Lips, mucosa, and tongue normal; teeth and gums normal   Neck:   Supple, symmetrical, trachea midline, no adenopathy;        thyroid:  No enlargement/tenderness/nodules; no carotid    bruit or JVD   Back:     Symmetric, no curvature, ROM normal, no CVA tenderness   Lungs:     Clear to auscultation bilaterally, respirations unlabored   Chest wall:    No tenderness or deformity   Heart:    Regular rate and rhythm, S1 and S2 normal, no murmur, rub   or gallop   Abdomen:     Soft, non-tender, bowel sounds active all four quadrants,     no masses, no organomegaly "   Extremities:   Normal, atraumatic, no cyanosis or edema   Pulses:   2+ and symmetric all extremities   Skin:   Skin color, texture, turgor normal, no rashes or lesions     Results    Lab Results personally reviewed   Lab Results   Component Value Date    CHOL 204 (H) 02/15/2017    CHOL 192 10/03/2016     Lab Results   Component Value Date    HDL 76 02/15/2017    HDL 78 10/03/2016     Lab Results   Component Value Date    LDLCALC 116 02/15/2017    LDLCALC 104 10/03/2016     Lab Results   Component Value Date    TRIG 58 02/15/2017    TRIG 48 10/03/2016     Lab Results   Component Value Date    WBC 7.8 02/15/2017    HGB 14.5 02/15/2017    HCT 43.7 02/15/2017     02/15/2017     Lab Results   Component Value Date    CREATININE 0.92 02/15/2017    BUN 21 02/15/2017     02/15/2017    K 3.4 (L) 02/15/2017    CO2 28 02/15/2017     Reviewed electrocardiogram sinus rhythm, occasional PAC, nonspecific interventricular conduction delay probable left bundle branch block, no acute changes.    Review of Systems:   General: WNL  Eyes: WNL  Ears/Nose/Throat: WNL  Lungs: WNL  Heart: WNL  Stomach: WNL  Bladder: WNL  Muscle/Joints: WNL  Skin: WNL  Nervous System: WNL  Mental Health: WNL     Blood: WNL

## 2021-06-10 NOTE — TELEPHONE ENCOUNTER
Narrow range INR 2.0-2.5, patient had lumbar injection in June and INr continues to trend down on same dose, recommend boost today to 7.5, and ~ 7% dose increase to 7.5mg M/F permanently, patient needs to recheck INR in 7-10 days max.    Tea Carlisle, PharmD, BCACP  Anticoagulation Clinical Pharmacist

## 2021-06-10 NOTE — TELEPHONE ENCOUNTER
ANTICOAGULATION  MANAGEMENT PROGRAM    Jaclyn Gibbs is overdue for INR check.     Spoke with Jaclyn and scheduled INR appointment on 8/11/20 @ [facility].      Dory Montenegro RN

## 2021-06-10 NOTE — PROGRESS NOTES
Patient NS #3. PT called and spoke with  who says that the patient has been very sick. PT gave him the number for the spine center to get an additional referral if needed.   [facility] Discharge Summary  Patient Name: Jaclyn Gibbs  Date: 8/3/2020  Referral Diagnosis: Neck pain  Referring provider: Florin Frank*  Visit Diagnosis:   1. Cervicalgia     2. Myofascial pain         Goals:  Pt. will demonstrate/verbalize independence in self-management of condition in : 6 weeks  Pt. will report decreased intensity, frequency of : Pain;in 6 weeks;Comment  Comment:: decrease pain from 0-8/10 to 0-3/10 with ADLs.  Patient Turn Head: for driving;for watching traffic;for conversation;with full ROM;with less pain;with less difficulty;in 12 weeks  Patient will look up / down: for reading;for computer work;with full ROM;with no pain;in 6 weeks    Patient will decrease : NDI score;by _ points;for improved quality of function;for improved quality of life;in 12 weeks  by ___ points: 15      Patient was seen for initial visit and did note return.  Therapy will be discontinued at this time.  The patient will need a new referral to resume.    Thank you for your referral.  Brooke Gonsalves  8/3/2020  12:23 PM

## 2021-06-10 NOTE — PROGRESS NOTES
NEUROSURGERY FOLLOWUP  NOTE    Jaclyn Gibbs comes today in f/u after prior apt   for  Left lateral calf pain which we thought was due to the foramenal stenosis at L5S1 in the setting of scoliosis with a curvature that may affect the L5 nerve root (in the lateral recess).       This is the symptom she had before Dr. Loza did his L L45 Decompression (8 years ago), which gave her some relief she believes (a week or two maybe a month) but she was ultimately disappointed as she continues to have the pain ). Can't take gabapentin, hasn't taken lyrica.     We sent her to Dr. Aponte (to consider the scoliosis) who  did not feel that the pt was an operative candidate at this time (low bone density and no weakness).  She is relieved by LEO at L5S1  (in 2016 lasting 2 1/2 - 3 mos) but more recently,  last injection only lasted  6 wks and one before that 2 wks.  Both had an immediate affect to reduce the  Pain, just not long lasting.     Since our last visit she continues to have pain on the left lateral part of her ankle/lateral left leg.  Worse with standing in one position walking and changing position.  Sitting in a good chair with a pad,   gives her relief .         The pts PMH, PSH, ROS, Meds, Allergies, SH, FH are all unchanged and summarized in the pts health history from last visit        PHYSICAL EXAM:   Constitutional: There were no vitals taken for this visit.     Mental Status: A & O in no acute distress.  Affect is appropriate.  Speech is fluent.  Recent and remote memory are intact.  Attention span and concentration are normal.     Cranial Nerves: CN1: grossly intact per patient recall. CN2: No funduscopic exam performed. CN3,4 & 6: Pupillary light response, lateral and vertical gaze normal.  No nystagmus.  Visual fields are full to confrontation. CN5: Intact to touch CN7: No facial weakness, smile, facial symmetry intact. CN8: Intact to spoken voice. CN9&10: Gag reflex, uvula midline, palate rises with  phonation. CN11: Shoulder shrug 5/5 intact bilaterally. CN12: Tongue midline and moves freely from side to side.     Motor: No pronator drift of upper extremity. Normal bulk and tone all muscle groups of upper and lower extremities with exception of left EHL.     Sensory: Sensation intact bilaterally to light touch.  Numb in left L5 and S1      Coordination:   Heel/toe/ gait intact. unsteady  tandem gait      Reflexes; supinator, biceps, triceps, knee/ ankle jerk present (better on R than on left) .   hoffmans/    babinski/ clonus.    IMAGING:   I personally reviewed all radiographic images     MRI 2015 lumbar with significant scoliosis and neuroforamenal stenosis at L5S1 along with lateral translation putting the L5 on torque     EMG done in February 2016 which shows L4 and L5 denervation in the paraspinous muscles without any active radiculopathy in the left lower extremity. She had no denervation in her left L5 leg muscles.     CONSULTATION ASSESSMENT AND PLAN:   Pt is no longer getting relief from the LEO injections and L EHL now weaker with numbness.  One could try a foramenotomy (which was not successful at the prior level) and likely would not be long lasting.  One could consider a DCS for leg pain.  I do not know if Dr. Aponte would reconsider the scoliosis surgery now.   She has low bone density on DEXA and we will ask Dr. Banks to treat.     I will order the MRI with and without contrast and she will f/u with me.        I spent more than 45 minutes in this apt, examining the pt, reviewing the scans, reviewing notes from chart, discussing treatment options with risks and benefits and coordinating care. >50 % clinic time was spent in face to face counseling and coordinating care    Judie Tariq      CC:     Pancho Banks MD  [address] [place] [address]

## 2021-06-10 NOTE — TELEPHONE ENCOUNTER
Refill Approved    Rx renewed per Medication Renewal Policy. Medication was last renewed on 6/24/19, last OV 1/27/20.    Pam Sage, [facility] Triage/Med Refill 8/5/2020     Requested Prescriptions   Pending Prescriptions Disp Refills     potassium chloride (K-DUR,KLOR-CON) 10 MEQ tablet [Pharmacy Med Name: POTASSIUM CHLORIDE ER 10 MEQ Tablet Extended Release] 90 tablet 3     Sig: TAKE 1 TABLET (10 MEQ TOTAL) BY MOUTH DAILY.       Potassium Supplements Refill Protocol Passed - 8/4/2020  1:15 PM        Passed - PCP or prescribing provider visit in past 12 months       Last office visit with prescriber/PCP: 1/27/2020 Pancho Banks MD OR same dept: 1/27/2020 Pancho Banks MD OR same specialty: 1/27/2020 Pancho Banks MD  Last physical: Visit date not found Last MTM visit: Visit date not found   Next visit within 3 mo: Visit date not found  Next physical within 3 mo: Visit date not found  Prescriber OR PCP: Pancho Banks MD  Last diagnosis associated with med order: 1. Essential hypertension  - potassium chloride (K-DUR,KLOR-CON) 10 MEQ tablet [Pharmacy Med Name: POTASSIUM CHLORIDE ER 10 MEQ Tablet Extended Release]; Take 1 tablet (10 mEq total) by mouth daily.  Dispense: 90 tablet; Refill: 3    If protocol passes may refill for 12 months if within 3 months of last provider visit (or a total of 15 months).             Passed - Potassium level in last 12 months     Lab Results   Component Value Date    Potassium 3.6 01/27/2020

## 2021-06-10 NOTE — PROGRESS NOTES
Patient is here to follow up for on sciatic nerve in left calf into left ankle.    She states that she has had recent injections which are not helping.   Radicular Pain:  Left leg to left ankle     Motor complaints: bilateral leg weakness   Gait and balance: yes  Patient has tried the following conservative measures: injections- not helping         EMIR today is: 32%       Deandre, CMA

## 2021-06-10 NOTE — TELEPHONE ENCOUNTER
ANTICOAGULATION  MANAGEMENT PROGRAM    Jaclyn Gibbs is overdue for INR check.     Spoke with Jaclyn and scheduled INR appointment on Wed 9/2/20 @ [facility].      Dory Montenegro RN

## 2021-06-10 NOTE — PROGRESS NOTES
Office Visit - Follow Up   Jaclyn Gibbs   84 y.o. female    Date of Visit: 5/19/2017    Chief Complaint   Patient presents with     Follow-up     Check up - fasting for lab work         Assessment and Plan   1. Essential hypertension with goal blood pressure less than 140/90  Has hypertension.  Takes half tablet of amlodipine 5 mg daily together with losartan 100 mg daily and indapamide 2.5 mg daily.  Shows increased blood pressure in the high 140s over high 80s.  Advised to increase amlodipine 5 mg to 1 tablet daily instead of half tablet daily since her blood pressure is not ideally controlled.  Continue losartan and indapamide.  Relieved that she does not have the cut into  half her amlodipine because it is getting getting harder for her to cut this tablet.  Check CBC and basic metabolic panel.  - amLODIPine (NORVASC) 5 MG tablet; Take 1 tablet (5 mg total) by mouth daily.  Dispense: 90 tablet; Refill: 3  - losartan (COZAAR) 100 MG tablet; Take 1 tablet (100 mg total) by mouth daily.  Dispense: 90 tablet; Refill: 3  - indapamide (LOZOL) 2.5 MG tablet; TAKE 1 TABLET EVERY DAY  Dispense: 90 tablet; Refill: 3  - HM2(CBC w/o Differential)  - Basic Metabolic Panel    2. Hypercholesteremia  Controlled without need for medication now.  Saw cardiology Dr. Novoa 5/3/2017 and he agreed not to start statin on her because of her advanced age.  Check lipids, TSH and liver function.  - Lipid Cascade  - Thyroid Stimulating Hormone (TSH)  - Hepatic Profile    3. Spinal stenosis of lumbar region with radiculopathy, L5 to S1  Has chronic back pains.  Does not respond to conservative treatments  anymore.  Saw Dr. Tariq of neurosurgery on 5/5/2017.  Was informed  she may need foraminotomy for her neuroforaminal stenosis of L5 and S1.  But before this she will have to decide and will get the MRI first,  and will get opinion from another neurosurgeon Dr. Aponte for her scoliosis.  Has a follow-up appointment with  Dr. Tariq in June.    4. Osteopenia  Her bone density done on 2/12/2016 osteopenia.  Neurosurgery thought she has osteoporosis that she needs treatment.  I will do her repeat bone density first before I will treat her for her osteopenia versus osteoporosis.  Scheduled for bone density.  Check vitamin D.  - DXA Bone Density Scan; Future  - Vitamin D, Total (25-Hydroxy)    5. OAB (overactive bladder)  Has overactive bladder.  Used to take tolterodine.  She stopped this on her own.  But she is having problems again waking up several times in the evening to void.  So she resumed taking tolterodine again which she thinks is helping her.  - tolterodine (DETROL LA) 4 MG ER capsule; TAKE ONE CAPSULE (4MG) BY MOUTH AT BEDTIME  Dispense: 30 capsule; Refill: 6    Reviewed neurosurgery notes and cardiology notes.  Reviewed and discussed the recommendations by the specialists    Follow up in 3 months.       History of Present Illness   This 84 y.o. old female is here for follow-up.  Has chronic back pains.  Found to have foraminal stenosis between L5 and S1.  Had conservative treatment for her back including epidural steroid injection and physical therapy but she did not respond.  Went back to see her neurosurgeon Dr. Tariq.  Was advised that she may need foraminotomy for her spinal stenosis.  Was seen by Dr. Cardona for her hyperlipidemia and coronary artery disease.  Symptom-free for her coronary artery disease.  Was advised that she does not need statin for her hyperlipemia  because of her advanced ag  Overalle.  Has recurrence of her urinary frequency and urgency.  Used to take tolterodine.  But stopped this on her own.  Reports that she started taking tolterodine and  this helped    Review of Systems   A 12 point comprehensive review of systems was negative except as noted..     Medications, Allergies and Problem List   Reviewed and updated             Chief Complaint   Follow-up (Check up - fasting for  lab work )       Patient Profile   Social History     Social History Narrative        Past Medical History   Patient Active Problem List   Diagnosis     Venous Thrombosis Of The Deep Vessels Of The Lower Extremity     Benign Pigmented Nevus     Seborrheic Keratosis     Basal Cell Carcinoma Of The Skin Of The Face     Lacunar Stroke     Obesity     Osteopenia     Decrease In Height     Hypertension     Coronary Artery Disease     Paroxysmal Supraventricular Tachycardia     Palpitations     Hypercholesteremia     Lower Back Pain     Spinal stenosis of lumbar region with radiculopathy, L5 to S1     Weakness of both lower extremities     Dyspnea     Back pain     GERD (gastroesophageal reflux disease)     Insomnia, unspecified type     Lower extremity weakness       Past Surgical History  She has a past surgical history that includes arthroplasty tibial plateau; boggs w/o facetec foramot/dskc  vrt seg, cervical; Appendectomy;  section; ablate heart dysrhythm focus; Cardiac catheterization; and Coronary angioplasty.       Medications and Allergies   Current Outpatient Prescriptions   Medication Sig     amLODIPine (NORVASC) 5 MG tablet Take 1 tablet (5 mg total) by mouth daily.     aspirin 81 MG EC tablet Take 81 mg by mouth daily.     CALCIUM CARB/MAGNESIUM OXID/D3 (CALCIUM MAGNESIUM + D ORAL) Take by mouth 2 (two) times a day. 750/300/500mg     fluocinolone-emol cmb#65 0.025 % Crea Apply 1 Dose topically 2 (two) times a day as needed.     GLUCOSAMINE/CHONDROITIN SULF A (GLUCOSAMINE-CHONDROITIN ORAL) Take 2 tablets by mouth daily.     indapamide (LOZOL) 2.5 MG tablet TAKE 1 TABLET EVERY DAY     losartan (COZAAR) 100 MG tablet Take 1 tablet (100 mg total) by mouth daily.     lutein 20 mg cap Take 1 capsule by mouth daily.     multivitamin (ONE A DAY) per tablet Take 1 tablet by mouth daily.     naproxen sodium (ALEVE) 220 MG tablet Take 220 mg by mouth 2 (two) times a day.     nitroglycerin (NITROSTAT) 0.4 MG SL  "tablet Place 1 tablet (0.4 mg total) under the tongue as needed for chest pain (Keep in original bottle).     OMEGA-3 FATTY ACIDS/FISH OIL (FISH OIL EXTRA STRENGTH ORAL) Take 1,470 mg by mouth daily.     omeprazole (PRILOSEC) 20 MG capsule Take 20 mg by mouth daily.     tolterodine (DETROL LA) 4 MG ER capsule TAKE ONE CAPSULE (4MG) BY MOUTH AT BEDTIME     Allergies   Allergen Reactions     Codeine      Diazepam Nausea Only     Morphine         Family and Social History   Family History   Problem Relation Age of Onset     Stroke Mother      Hypertension Father      Sudden death Father         Social History   Substance Use Topics     Smoking status: Never Smoker     Smokeless tobacco: Never Used     Alcohol use No        Physical Exam       Physical Exam  /84 (Patient Site: Left Arm, Patient Position: Sitting)  Pulse 65  Ht 5' 3\" (1.6 m)  Wt 161 lb (73 kg)  SpO2 98%  BMI 28.52 kg/m2  General appearance: alert, appears stated age, cooperative and no distress  Head: Normocephalic, without obvious abnormality, atraumatic  Throat: lips, mucosa, and tongue normal; teeth and gums normal  Back: symmetric, no curvature. ROM normal. No CVA tenderness.  Lungs: clear to auscultation bilaterally  Heart: regular rate and rhythm, S1, S2 normal, no murmur, click, rub or gallop  Abdomen: soft, non-tender; bowel sounds normal; no masses,  no organomegaly  Extremities: extremities normal, atraumatic, no cyanosis or edema  Skin: Skin color, texture, turgor normal. No rashes or lesions   usculoskeletal: Tender low back but just normal range of motion.  No lower extremity weakness and numbness.      Additional Information        Pancho Banks MD  Internal Medicine  Contact me at [phone]     Additional Information   Current Outpatient Prescriptions   Medication Sig     amLODIPine (NORVASC) 5 MG tablet Take 1 tablet (5 mg total) by mouth daily.     aspirin 81 MG EC tablet Take 81 mg by mouth daily.     CALCIUM " CARB/MAGNESIUM OXID/D3 (CALCIUM MAGNESIUM + D ORAL) Take by mouth 2 (two) times a day. 750/300/500mg     fluocinolone-emol cmb#65 0.025 % Crea Apply 1 Dose topically 2 (two) times a day as needed.     GLUCOSAMINE/CHONDROITIN SULF A (GLUCOSAMINE-CHONDROITIN ORAL) Take 2 tablets by mouth daily.     indapamide (LOZOL) 2.5 MG tablet TAKE 1 TABLET EVERY DAY     losartan (COZAAR) 100 MG tablet Take 1 tablet (100 mg total) by mouth daily.     lutein 20 mg cap Take 1 capsule by mouth daily.     multivitamin (ONE A DAY) per tablet Take 1 tablet by mouth daily.     naproxen sodium (ALEVE) 220 MG tablet Take 220 mg by mouth 2 (two) times a day.     nitroglycerin (NITROSTAT) 0.4 MG SL tablet Place 1 tablet (0.4 mg total) under the tongue as needed for chest pain (Keep in original bottle).     OMEGA-3 FATTY ACIDS/FISH OIL (FISH OIL EXTRA STRENGTH ORAL) Take 1,470 mg by mouth daily.     omeprazole (PRILOSEC) 20 MG capsule Take 20 mg by mouth daily.     tolterodine (DETROL LA) 4 MG ER capsule TAKE ONE CAPSULE (4MG) BY MOUTH AT BEDTIME     Allergies   Allergen Reactions     Codeine      Diazepam Nausea Only     Morphine      Social History   Substance Use Topics     Smoking status: Never Smoker     Smokeless tobacco: Never Used     Alcohol use No         Time: total time spent with the patient was 40 minutes of which >50% was spent in counseling and coordination of care

## 2021-06-11 NOTE — PROGRESS NOTES
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         This is the symptom she had before Dr. Loza did his L L45 Decompression (8 years ago), which gave her some relief she believes (a week or two maybe a month) but she was ultimately disappointed as she continues to have the pain ). Can't take gabapentin, hasn't taken lyrica.      We sent her to Dr. Aponte (to consider the scoliosis) who  did not feel that the pt was an operative candidate at this time (low bone density and no weakness).  She is relieved by LEO at L5S1  (in 2016 lasting 2 1/2 - 3 mos) but more recently,  last injection only lasted  6 wks and one before that 2 wks.  Both had an immediate affect to reduce the  Pain, just not long lasting.       Since our last visit she continues to have pain on the left lateral part of her ankle/lateral left leg.  Worse with standing in one position walking and changing position.  Sitting in a good chair with a pad,   gives her relief .   Worse with bare feet.  Better with shoes.       Since our last apt, We sent her for a new MRI and f/u to see if her anatomy had worsened from 2015.    Continues to have same pain, with walking better with LEO. (but the last one didn't work.)           The pts PMH, PSH, ROS, Meds, Allergies, SH, FH are all unchanged and summarized in the pts health history from last visit          PHYSICAL EXAM:   Constitutional: There were no vitals taken for this visit.      Mental Status: A & O in no acute distress.  Affect is appropriate.  Speech is fluent.  Recent and remote memory are intact.  Attention span and concentration are normal.      Cranial Nerves: CN1: grossly intact per patient recall. CN2: No funduscopic exam performed. CN3,4 & 6: Pupillary light  response, lateral and vertical gaze normal.  No nystagmus.  Visual fields are full to confrontation. CN5: Intact to touch CN7: No facial weakness, smile, facial symmetry intact. CN8: Intact to spoken voice. CN9&10: Gag reflex, uvula midline, palate rises with phonation. CN11: Shoulder shrug 5/5 intact bilaterally. CN12: Tongue midline and moves freely from side to side.      Motor: No pronator drift of upper extremity. Normal bulk and tone all muscle groups of upper and lower extremities with exception of left EHL.      Sensory: Sensation intact bilaterally to light touch.  Numb in left L5 and S1       Coordination:   Heel/toe/ gait intact. unsteady  tandem gait       Reflexes; supinator, biceps, triceps, knee present  ankle jerk absent t (better on left than on right at knee ) .   hoffmans/    babinski/ clonus.    SLR mildly positive if hyperextending foot on left.        IMAGING:   I personally reviewed all radiographic images      MRI 2017  lumbar with significant scoliosis and neuroforamenal stenosis at L5S1 along with lateral translation putting the L5 on torque    looks similar to 2015.                EMG done in February 2016 which shows L4 and L5 denervation in the paraspinous muscles without any active radiculopathy in the left lower extremity. She had no denervation in her left L5 leg muscles.      Prior Dexa scan (2016) osteopenia     New Dexa scan pending.     CONSULTATION ASSESSMENT AND PLAN:     Pt is no longer getting relief from the LEO injections and L EHL now weak  with numbness.  One could try a foramenotomy (which was not successful at the prior level by a different surgeon) and likely would not be long lasting.  One could consider a DCS for leg pain.  I do not know if Dr. Aponte would reconsider the scoliosis surgery now.   She has low bone density on DEXA and we will ask Dr. Banks to treat.             I spent more than 30 minutes in this apt, examining the pt, reviewing the scans, reviewing  notes from chart, discussing treatment options with risks and benefits and coordinating care. >50 % clinic time was spent in face to face counseling and coordinating care     Judie Tariq        CC:      Pancho Banks MD  [address] [place] [address]

## 2021-06-11 NOTE — TELEPHONE ENCOUNTER
Who is calling:  Jaclyn  Reason for Call:  Patient missed her 9/23 INR & she rescheduled for 9/25, she wanted to let Debby know.  Date of last appointment with primary care: 1/27/2020  Okay to leave a detailed message: Yes

## 2021-06-11 NOTE — PROGRESS NOTES
Neurosurgery consultation was requested by: Dr. Tariq.   Pain: Denies back pain   Radicular Pain is present: Left buttock pain and then pain in the lateral side of ankle and lower calf on and off  Lhermitte sign: No  Motor complaints: Weakness in left leg   Sensory complaints: Tingling in lateral side ankle and calf   Gait and balance issues: Yes  Bowel or bladder issues: Denies   Duration of SX is: Years   The symptoms are worse with: Standing and walking   The symptoms are better with: Sitting   Injury: Denies   Severity is: Mild- moderate  Patient has tried the following conservative measures: She has done many injections used to work but last few injections have not worked.   EMIR score is: 30%  AUNDREA Villatoro

## 2021-06-11 NOTE — TELEPHONE ENCOUNTER
ANTICOAGULATION  MANAGEMENT    Assessment     Today's INR result of 2.70 is Supratherapeutic (goal INR of 2.0 - 2.5)        Warfarin taken differently than instructed, but no impact to total weekly dose    - reported missing 2 doses of warfarin 2-3 wks ago.     No new diet changes affecting INR    - reported eating her 3 meals per day.    No new medication/supplements affecting INR    Continues to tolerate warfarin with no reported s/s of bleeding or thromboembolism     Previous INR was Subtherapeutic at 1.40 on 8/11/20.    NOTE:  Jaclyn was advised to ensure she establishes care with [facility].  She reported, it getting harder for her to drive to [facility] and [facility] is just down the street from her in [facility].  At this time, we will continue to manage her INR's and warfarin doses.    Consulted pharmacist, Sandie Galaviz, PharmD due to non-standard INR goal.    Plan:     Spoke on phone with Jaclyn regarding INR result and instructed:    1.  Advised to see her doctor @ [facility], if her symptoms persist and worsen.     Warfarin Dosing Instructions:  (evenings. Has 5mg tabs)   - today, advised one time lower dose with 2.5mg warfarin,    - then change warfarin dose to 7.5 mg daily on Fridays; and 5 mg daily rest of week.   - (6.3 % change)    Instructed patient to follow up no later than: 2 wks.    Education provided: importance of consistent vitamin K intake, target INR goal and significance of current INR result and importance of notifying clinic for changes in medications    Jaclyn verbalizes understanding and agrees to warfarin dosing plan.    Instructed to call the [facility] for any changes, questions or concerns. (#[phone])   ?   Dory Montenegro RN    Subjective/Objective:      Jaclyn Gibbs, a 87 y.o. female is on warfarin.     Jaclyn reports:     Home warfarin dose: template incorrect; verbally confirmed home dose with Jaclyn and updated on anticoagulation calendar     Missed doses:  "Yes:  Reported missing 2 warfarin doses 2-3 wks ago.     Medication changes:  No     S/S of bleeding or thromboembolism:  No     New Injury or illness:  Yes:  Reported \"not feeling up to par\"     Changes in diet or alcohol consumption:  No     Upcoming surgery, procedure or cardioversion:  No    Anticoagulation Episode Summary     Current INR goal:   2.0-2.5   TTR:   49.5 % (1 y)   Next INR check:   9/16/2020   INR from last check:   2.70 (9/2/2020)   Goal at result date:   2.0-3.0   Weekly max warfarin dose:      Target end date:   Indefinite   INR check location:      Preferred lab:      Send INR reminders to:   [facility], [place], operated by [facility]    Indications    Other acute pulmonary embolism without acute cor pulmonale (H) (Resolved) [I26.99]           Comments:   Goal range 2.0 - 2.5 per Dr. Cardona, 12/12/18         Anticoagulation Care Providers     Provider Role Specialty Phone number    Robin Catherine MD Referring Internal Medicine [phone]        "

## 2021-06-11 NOTE — TELEPHONE ENCOUNTER
PSP:  Dr. Torres   Last clinic visit:  OV 6/18/2020; last Inj 6/16/2020  Reason for call: Request for repeat injection  Clinical information:  Pt calling requesting repeat Left L5-S1 TFESI. Pt reports 100% relief from her lat inj on 6/16. Her same pain returning this past Monday 8/31. Pt has gotten repeat injections for the past several years every 3 months. Order was placed for repeat injection. Injection requirements reviewed.   Advice given to patient: Advised pt she can schedule injection at her convenience. Pt was transferred to scheduling to make appt.   Provider to address: ALIYN

## 2021-06-11 NOTE — PROGRESS NOTES
A consult was placed to Dr. Aponte.  The reason for the consult was back pain.  The following XR were ordered to assess for alignment: scoliosis and AP/Lat, Flex/Ext.  Reema Garcia RN, CNRN

## 2021-06-11 NOTE — PROGRESS NOTES
"Jaclyn Gibbs was last seen on 5/15/2017 for her left lateral calf pain associated with foramenal stenosis at L5-S1.  Today she returns in follow up with an updated lumbar MRI scan. She reports more persistent and intense pain in the left lateral calf associated with standing and walking. States her left leg feels \"weaker now\". There is a constant numbness in her left foot. Denies incontinence or saddle anesthesia. Gait and balance are stable.  EMIR score is 48%  Reema Garcia RN, CNRN    "

## 2021-06-11 NOTE — PATIENT INSTRUCTIONS - HE
DISCHARGE INSTRUCTIONS    During office hours (8:00 a.m.- 4:00 p.m.) questions or concerns may be answered  by calling [facility] Navigation Nurses at  [phone].  Messages received after hours will be returned the following business day.      In the case of an emergency, please dial 911 or seek assistance at the nearest Emergency Room/Urgent Care facility.     All Patients:    ? You may experience an increase in your symptoms for the first 2 days (It may take anywhere between 2 days- 2 weeks for the steroid to have maximum effect).    ? You may use ice on the injection site, as frequently as 20 minutes each hour if needed.    ? You may take your pain medicine.    ? You may continue taking your regular medication after your injection. If you have had a Medial Branch Block you may resume pain medication once your pain diary is completed.    ? You may shower. No swimming, tub bath or hot tub for 48 hours.  You may remove your bandaid/bandage as soon as you are home.    ? You may resume light activities, as tolerated.    ? Resume your usual diet as tolerated.    ? It is strongly advised that you do not drive for 1-3 hours post injection.    ? If you have had oral sedation:  Do not drive for 8 hours post injection.      ? If you have had IV sedation:  Do not drive for 24 hours post injection.  Do not operate hazardous machinery or make important personal/business decisions for 24 hours.      POSSIBLE STEROID SIDE EFFECTS (If steroid/cortisone was used for your procedure)    -If you experience these symptoms, it should only last for a short period      Swelling of the legs                Skin redness (flushing)       Mouth (oral) irritation     Blood sugar (glucose) levels              Sweats                      Mood changes    Headache    Sleeplessness         POSSIBLE PROCEDURE SIDE EFFECTS  -Call the Spine Center if you are concerned    Increased Pain             Increased numbness/tingling         Nausea/Vomiting            Bruising/bleeding at site        Redness or swelling                                                Difficulty walking        Weakness             Fever greater than 100.5    *In the event of a severe headache after an epidural steroid injection that is relieved by lying down, please call the [facility] to speak with a clinical staff member*

## 2021-06-11 NOTE — TELEPHONE ENCOUNTER
ANTICOAGULATION  MANAGEMENT    Assessment     Today's INR result of 2.50 is Therapeutic (goal INR of 2.0 - 2.5)        Less warfarin taken than instructed which may be affecting INR    No new diet changes affecting INR    Potential interaction between Methylprednisolone injection and warfarin which may affect subsequent INRs    - TF-LEO injection of LS on 9/15/20.    Continues to tolerate warfarin with no reported s/s of bleeding or thromboembolism     Previous INR was Subtherapeutic at 1.40 on 9/15/20.    Plan:     Spoke on phone with  regarding INR result and instructed:    1.  Since INR was therapeutic at 2.50.  Jaclyn will continue with 5mg warfarin daily.   2.  Will check INR sooner in one wk, to ensure INR stability.    Warfarin Dosing Instructions:    - reported taking the 7.5mg warfarin dose on 9/15/20. However, resumed the next day only taking 5mg daily.    Instructed patient to follow up no later than:  One wk.    Education provided: target INR goal and significance of current INR result and importance of taking warfarin as instructed    Jaclyn verbalizes understanding and agrees to warfarin dosing plan.    Instructed to call the [facility] for any changes, questions or concerns. (#[phone])   ?   Dory Montenegro RN    Subjective/Objective:      Jaclyn Gibbs, a 88 y.o. female is on warfarin.     Jaclyn reports:     Home warfarin dose: template incorrect; verbally confirmed home dose with Jaclyn and updated on anticoagulation calendar     Missed doses: No.  However, taking less warfarin dose, than instructed.     Medication changes:  No     S/S of bleeding or thromboembolism:  No     New Injury or illness:  No     Changes in diet or alcohol consumption:  No     Upcoming surgery, procedure or cardioversion:  No    Anticoagulation Episode Summary     Current INR goal:   2.0-2.5   TTR:   52.1 % (1 y)   Next INR check:   10/5/2020   INR from last check:   2.50 (9/25/2020)   Weekly max  warfarin dose:      Target end date:   Indefinite   INR check location:      Preferred lab:      Send INR reminders to:   [facility]    Indications    Other acute pulmonary embolism without acute cor pulmonale (H) (Resolved) [I26.99]           Comments:   Goal range 2.0 - 2.5 per Dr. Cardona, 12/12/18         Anticoagulation Care Providers     Provider Role Specialty Phone number    Robin Catherine MD Referring Internal Medicine [phone]

## 2021-06-11 NOTE — PROGRESS NOTES
Diagnoses and all orders for this visit:    Other secondary scoliosis, thoracolumbar region    Left lumbar radiculopathy  -     Ambulatory referral to Chiropractic               It was a pleasure to evaluate Jaclyn Gibbs again at the request of Dr. Tariq for scoliosis and lumbar radiculopathy     I reviewed Jaclyn's MRI lumbar spine 6/17, DEXA scan 6/17 which showed osteopenia peripherally but spine not measured, standing scoliosis x-rays, lumbar xrays 6/17, neurology consultation note from Dr. Key and EMG results from Dr. Key.     She really has minimal back pain from her scoliosis.  She states that her left lateral calf pain is nearly 100% of her concern.  This is a focal pain in her left calf that does not affect her thigh or buttock. Left L5-S1 transforaminal epidural steroid injections relieved approximately 90% of this pain initially but lately have stopped working.      I again used a spine model and her MRI to show her the problem as to why she has left L5-related pain.    She has only 4/5 left extensor hallucis longus strength and does not have foot drop.  Her EMG showed paraspinous radiculopathy in the L4 and L5 regions without any lower extremity signs of radiculopathy.     She certainly has left L5-S1 foraminal stenosis.  This is because of facet hypertrophy and overgrowth given the convexity of her scoliosis.  I do not think that foraminal decompression at L5-S1 would provide her any sort of lasting relief.  I think in fact that she may get only several weeks to months of relief before recurrence. I think that her left L5 nerve root is likely also affected by the left L3-4 facet at the apex of her curve, with the facet causing significant left lateral recess narrowing for the traversing nerve roots. This would not be correctable without curve correction and instrumented fusion; decompression is contraindicated at the apex of a 40 degree scoliosis.     I do not think that she  would benefit from correction of her scoliosis at this point because she has only minimal back pain and no significant radiculopathy or weakness on exam in her lower extremities, and the risks of long-construct fusion surgery outweigh the benefits for her.     The patient herself does NOT want to consider any scoliosis surgery and stated that clearly to me today, as well as said that to Dr. oLza prior to her initial surgery per her report.     I think that is a palliative measure for pain control, it would be reasonable to try a superficial peroneal nerve block, I explained that the downside of this is foot eversion weakness can develop, but I have sent her to see Dr. Bacilio Chen at [facility] to see if he would consider doing the injection for her for the focal lateral calf pain.     I explained to her the lumbar traction physical therapy may also help her and I have ordered this previously.  I explained her that with therapy and recumbent biking or well-tolerated and patient's scoliosis and I encouraged her to return to classes that she previously attended at the [facility].       Because she is not a candidate for scoliosis correction surgery, she does not need further followup with me. If she wants to consider foraminal decompression with its associated low-benefit profile for her, she will followup with Dr. Tariq.    I spent 30 minutes in patient care with greater than 50% spent in counseling and/or coordination of care.       Subjective:    Patient ID: Jaclyn Gibbs is a 83 y.o. female.     RAMSES Anaya has 100% left leg pain only in her left lateral calf.  This pain does not radiate from her back or down her leg.  She had a prior left L4-L5 decompression several years ago by Dr. Loza that did not help her pain. Dr. Loza had explained to the patient at that time that decompression surgery would be unlikely to relieve her pain and that she needed scoliosis correction and  fusion and the patient declined at that time per her report.     Her pain is exacerbated by standing and walking.  It was relieved significantly by left L5-S1 transforaminal epidural steroid injections, with approximately 2-2-1/2 months of sustained relief with each injection, but her last two injections in her spine have not worked significantly.  No bowel or bladder changes, she does not feel progressive weakness in her legs. She has weakness in her left great toe but not footdrop.     She had seen Dr. Tariq who referred her to me due to the complexity of her spine.  She has had an EMG done in February 2016 which shows L4 and L5 denervation in the paraspinous muscles without any active radiculopathy in the left lower extremity.  She had no denervation in her left L5 leg muscles.        Review of Systems   Constitutional: Negative for activity change, appetite change, chills, fatigue, fever and unexpected weight change.   HENT: Negative for dental problem, mouth sores and trouble swallowing.    Eyes: Negative for visual disturbance.   Respiratory: Negative for shortness of breath.    Cardiovascular: Negative for leg swelling.   Gastrointestinal: Negative for abdominal pain, constipation, diarrhea, nausea and vomiting.   Endocrine: Negative for cold intolerance.   Genitourinary: Negative for difficulty urinating, dysuria, enuresis, frequency and urgency.   Musculoskeletal: Positive for back pain, gait problem and joint swelling. Negative for neck pain and neck stiffness.   Skin: Negative for color change.   Allergic/Immunologic: Negative for immunocompromised state.   Neurological: Positive for weakness and numbness. Negative for tremors, speech difficulty and headaches.   Hematological: Does not bruise/bleed easily.   Psychiatric/Behavioral: The patient is not nervous/anxious.            Past Medical History   Diagnosis Date     Coronary artery disease       Hypertension       Vertigo               Past  Surgical History   Procedure Laterality Date     Pr arthroplasty tibial plateau           Description: Knee Replacement;  Recorded: 10/27/2008;  Comments: right, 03.     Pr boggs w/o facetec foramot/dskc  vrt seg, cervical           Description: Laminectomy Lumbar;  Recorded: 2013;     Appendectomy          section         Pr ablate heart dysrhythm focus           Description: Catheter Ablation Atrial Supraventricular Tachycardia;  Proc Date: 2014;  Comments: typical AVNRT,  successful cryoablation       Social History    History            Social History     Marital status:        Spouse name: N/A     Number of children: N/A     Years of education: N/A          Occupational History     Not on file.           Social History Main Topics     Smoking status: Never Smoker     Smokeless tobacco: Never Used     Alcohol use: No     Drug use: No     Sexual activity: Not on file           Other Topics Concern     Not on file      Social History Narrative         Family History- possible maternal scoliosis; definitive maternal osteoporosis     IMAGING: MRI cervical spine no canal stenosis, MRI lumbar spine with left lateral recess stenosis at L3-4 and left L5S1 foraminal stenosis, scoliosis xrays with 40 degrees convex left T12-L5 thoracolumbar main curve; pelvic obliquity less than 3 degrees         Objective:    Physical Exam   Constitutional: She is oriented to person, place, and time. She appears well-developed and well-nourished. She is cooperative. No distress.   HENT:   Head: Normocephalic and atraumatic.   Eyes: Conjunctivae are normal.   Neck: Normal range of motion. Neck supple. No spinous process tenderness and no muscular tenderness present. No tracheal deviation present.   Cardiovascular: Normal rate and regular rhythm.    Pulmonary/Chest: Effort normal and breath sounds normal.   Abdominal: Soft. Bowel sounds are normal. She exhibits no distension. There is no tenderness.    Musculoskeletal:   Cervical flexion/extension ROM: normal  Lumbar flexion/extension ROM: slightly reduced flexion due to pain   Neurological: She is alert and oriented to person, place, and time. No cranial nerve deficit. She displays a negative Romberg sign. Gait normal. She displays no Babinski's sign on the right side. She displays no Babinski's sign on the left side.   Decreased sensation dorsum left foot.  Reflex Scores:       Tricep reflexes are 2+ on the right side and 2+ on the left side.       Bicep reflexes are 2+ on the right side and 2+ on the left side.       Brachioradialis reflexes are 2+ on the right side and 2+ on the left side.       Patellar reflexes are 2+ on the right side and 2+ on the left side.       Achilles reflexes are 2+ on the right side and 2+ on the left side.  Strength:                                                LEFT      RIGHT  Deltoid                                     5            5  Bicep                                       5            5  Wrist Extensor                        5            5   Tricep                                      5            5  Finger Flexion                         5             5  Finger Abduction                     5            5                                            5           5    Hip Flexion                               5            5   Knee Extension                       5             5  Dorsiflexion                              5            5  Extensor Hallucis Longus        4            5  Plantar Flexion                         5             5  Foot eversion                           5              5    Able to heel walk bilaterally    Straight leg raise negative bilaterally  Negative lateral bending for reproduction of pain  Negative axial loading/extension for reproduction of pain     Skin: Skin is warm, dry and intact.   Psychiatric: She has a normal mood and affect. Her speech is normal and behavior is normal.

## 2021-06-11 NOTE — TELEPHONE ENCOUNTER
"ANTICOAGULATION  MANAGEMENT    Assessment     Today's INR result of 1.40 is Subtherapeutic (goal INR of 2.0 - 2.5)     - INR checked prior to [facility] injection [facility].      Warfarin taken as previously instructed    - However, she is unsure she missed any.  She swears she is not missing any doses.    No new diet changes affecting INR    Potential interaction between Methylprednisolone injection and warfarin which may affect subsequent INRs    Continues to tolerate warfarin with no reported s/s of bleeding or thromboembolism     Previous INR was Supratherapeutic at 2.70 on 9/2/20.    9/15/20 post TF-LEO (Methylprednisolone injection) of Lumbosacral spine, unilateral.    Consulted pharmacist, Sandie Galaviz, PharmD with warfarin dosing d/t non-standard INR goal.    Plan:     Spoke on phone with Jaclyn regarding INR result and instructed:    1.  Reiterated to Jaclyn - she needs to f/u with her doctor @ [facility] - she keeps complaining of \"not feeling good for a while now,\"  but not able to described how she is feeling.     2.  Then advised Jaclyn to inform new doctor she is on warfarin therapy, so they can manage future INR's / Warfarin doses.   3.  Jaclyn reported, getting to difficult to see Dr. Banks @ [facility]  [facility] is only 2 blocks from her house.    Warfarin Dosing Instructions:    - Change warfarin dose to 7.5 mg daily on Tues/Fri; and 5 mg daily rest of week.   - (6.7 % change)    Instructed patient to follow up no later than:  One wk.   - INR scheduled on 9/22/20 @ [facility].    Education provided: importance of consistent vitamin K intake, target INR goal and significance of current INR result, potential interaction between warfarin and Methylprednisolone injection and importance of notifying clinic for changes in medications    Jaclyn verbalizes understanding and agrees to warfarin dosing plan.    Instructed to call the [facility] for any changes, questions or concerns. (#[phone])   ? " "  Dory Montenegro RN    Subjective/Objective:      Jaclyn Gibbs, a 88 y.o. female is on warfarin.     Jaclyn reports:     Home warfarin dose: verbally confirmed home dose with Jaclyn and updated on anticoagulation calendar     Missed doses: No     Medication changes:  Yes:  [facility] today for Steroid injection (Methylprednisolone) of lumbosacral spine, unilateral.  Warfarin therapy continued, as long as INR is under 3.0.     S/S of bleeding or thromboembolism:  No     New Injury or illness:  Yes.  Reported \" not feeling good for a while now.\"     Changes in diet or alcohol consumption:  No     Upcoming surgery, procedure or cardioversion:  No    Anticoagulation Episode Summary     Current INR goal:   2.0-2.5   TTR:   50.9 % (1 y)   Next INR check:   9/22/2020   INR from last check:   1.40! (9/15/2020)   Weekly max warfarin dose:      Target end date:   Indefinite   INR check location:      Preferred lab:      Send INR reminders to:   [facility]    Indications    Other acute pulmonary embolism without acute cor pulmonale (H) (Resolved) [I26.99]           Comments:   Goal range 2.0 - 2.5 per Dr. Cardona, 12/12/18         Anticoagulation Care Providers     Provider Role Specialty Phone number    Robin Catherine MD Referring Internal Medicine [phone]        "

## 2021-06-11 NOTE — PROGRESS NOTES
Assessment:   Jaclyn Gibbs is a 84 y.o. y.o. female with past medical history significant for obesity, osteopenia, hypertension, coronary artery disease, hyperlipidemia  who presents today for follow-up regarding chronic low back pain and left radicular leg pain that has stopped responding to left L5-S1 transforaminal epidural steroid injections.  She has significant scoliosis causing severe degenerative changes including left L4-5 and L5-S1 foraminal stenosis.  She is not thought to be a good candidate for surgery at this time.  She is concerned about trying to function with the amount of pain that she has if the injections stop working.  She is without any red flag symptoms.         Plan:     A shared decision making plan was used.  The patient's values and choices were respected.  The following represents what was discussed and decided upon by the physician and the patient.      1.  DIAGNOSTIC TESTS: The patient's MRI of the lumbar spine was personally reviewed today.  No further diagnostic testing necessary at this time.  2.  PHYSICAL THERAPY: She has previously completed physical therapy.  She is encouraged to continue doing her home excise program on a regular basis.  3.  MEDICATIONS: No changes to her medications at this time.  She manages with over-the-counter Aleve.    -Will be discussed the possibility of re-trialing gabapentin versus Lyrica if she does not have relief with today's injection.    4.  INTERVENTIONS: Discussed trialing a left L4-5 transfemoral epidural steroid injection given her significant left L4-5 foraminal stenosis.  She did not have any relief with her last left L5-S1 transfemoral epidural steroid injection.  Discussed the risks and benefits of performing a left L4-5 TF LEO versus a left L5-S1 TF LEO.  In the end, the patient preferred to try the left L4-5 TF LEO to see if this would give her any benefit, given that she did not have any relief whatsoever with the last left  L5-S1 TF LEO performed in April 2017.   Please see the separate procedure note for the left L4-5 transforaminal epidural steroid injection that was performed today.  -The patient has been referred for a superficial peroneal injection.  Discussed that this injection carries risk and is unlikely to help.  Has a consultation with Dr. Doty on July 10.  She plans to keep the consultation, but states that if this injection helps she will not move forward with the injection.    -Discussed that she should not have more than 6 corticosteroid injections in a 12 month period.  Discussed the risk for a spontaneous hip fracture which could potentially cause death within a year if she sustains a hip fracture given her age.  The patient feels that the benefits of the injections outweigh the risk for a spontaneous hip fracture, she states that she cannot function without having the lumbar epidural corticosteroid injections.    5.  PATIENT EDUCATION:    -She was offered a surgical consult with a surgeon outside of the neurosurgery group.  She declined at this time.  States that she has already had a consultation with Dr. John Loza who said that her surgery would be risky and quite extensive.  -Discussed spinal cord stimulation as a potential option given that she is not a good surgery candidate.  The patient was interested in spinal cord stimulation.  She was given a patient information packet.  She also requested that a representative call her to set up an appointment for free consultation for more information.  Abdiel Denney from [facility] [employer] was notified and will contact the patient to set up this meeting at the spine clinic.  6.  FOLLOW-UP: The patient is welcome to follow-up as needed.  If she decides to move forward with a spinal cord stimulation trial, she will need to undergo a behavioral health evaluation.  Prior authorization would also need to be received from [employer].    Subjective:     Jaclyn Gibbs is  a 84 y.o. female who presents today for follow-up regarding back pain and left radicular leg pain.  Pain goes down the lateral thigh and lateral lower leg stopping about the ankle.  The patient has been seen by Dr. Tariq who referred her to Dr. Aponte.  Dr. Aponte does not feel that she is a good candidate for surgery given her age and how complex surgery would be.  The patient reports that she did not have any relief with the last L5-S1 transfemoral epidural steroid injection performed in April 2017.  She reports that the injection before that did provide 2 weeks relief, but it is not the same amount of relief that she is to have with previous injections.  She is feeling rather upset that there are no other options at this time.  She does not want to undergo a big surgery.  However she states that with the amount of pain that she has she can hardly function.  It makes her very sad to think that she would have to live the rest of her life with this pain.  She is wondering if there are any other options.  She has not had good relief with medications for the radicular leg pain.  She currently manages with naproxen.  Overall her pain seems to be worse with walking and any sort of household activities.  She does feel slightly better at rest, but she is frustrated that she no longer has relief with the injections that she used to.    Past medical history is reviewed and is unchanged for any new medical diagnoses in the interim.      Family history is reviewed and is unchanged in the interim.      Review of Systems:  Positive for sensation of weakness as well as numbness and tingling in the leg.     Objective:   CONSTITUTIONAL:  Vital signs as above.  No acute distress.  The patient is well nourished and well groomed.    PSYCHIATRIC:  The patient is awake, alert, oriented to person, place and time.  The patient is answering questions appropriately with clear speech.  Normal affect.  SKIN:  Skin over the face,  posterior torso, bilateral upper and lower extremities is clean, dry, intact without rashes.  MUSCULOSKELETAL:  Gait is non-antalgic.  The patient is able to heel and toe walk without any difficulty.  She does have asymmetry of the lumbar spine consistent with scoliosis.     RESULTS: Patient's MRI of the lumbar spine was personally reviewed today.  She does have a significant scoliosis.  There is severe left L4-5 and L5-S1 foraminal stenosis along with disc degeneration and facet arthropathy at multiple levels.  Please refer the report for details.

## 2021-06-12 NOTE — PROGRESS NOTES
Office Visit - Follow Up   Jaclyn Gibbs   84 y.o. female    Date of Visit: 8/25/2017    Chief Complaint   Patient presents with     Follow-up     3 months,pt is fasting        Assessment and Plan   1. Pulmonary embolism  Had sudden onset of chest pains on 8/8/2017.  Went to the emergency room on 8/9/2017 and found to have pulmonary embolism involving the right lower lobe.  Was confined for 2 days at [facility] from 8/9/2017 2 8/11/2017.  Was started on Lovenox subcu and overlapped with warfarin.  Now on warfarin 5 mg daily.  Still not feeling back to her baseline yet.  Feels weak and tired.  Still recovering from her episode of pulmonary embolism which is expected for age.  Due for INR.  Check CBC and INR.  - HM2(CBC w/o Differential)  - INR    2. Essential hypertension with goal blood pressure less than 140/90  Controlled.  Continue amlodipine, indapamide and losartan.  Check basic metabolic panel.  - Basic Metabolic Panel    3. Hypercholesteremia  Controlled without medication.  Last lipids were good.  Check lipids and liver function.  - Lipid Cascade  - Hepatic Profile    4. Osteopenia  Has osteopenia.  Takes calcium with vitamin D.    Reviewed ED notes, hospital notes, labs and leg ultrasound, head CT and CT PE run.  Discussed all these with the patient    Discussed with the patient pulmonary embolism and its treatment.  Advised there is no need to reimage her lungs since blood clot will be  lysed naturally by her circulation.    Follow up in 4 weeks.     History of Present Illness   This 84 y.o. old female here for follow-up.  Was hospitalized for acute pulmonary embolism from 8/9/2017 2 8/11/2017.  Was treated with Lovenox subcu overlapped with warfarin.  Now taking 5 mg warfarin daily.  Due for her INR check today.  Does not still feel well.  Reports she is not back to her baseline yet.  Feels tired and fatigued.  Gets a bit dizzy at times.  Denies shortness of breath.  No more chest  pains.  Has hypertension controlled by her medications.  Has hyperlipidemia controlled without medication.  Overall stable.    Review of Systems   A 12 point comprehensive review of systems was negative except as noted..     Medications, Allergies and Problem List   Reviewed and updated             Chief Complaint   Follow-up (3 months,pt is fasting)       Patient Profile   Social History     Social History Narrative    , 6 children, active, is a full code.  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     Benign Pigmented Nevus     Seborrheic Keratosis     Basal Cell Carcinoma Of The Skin Of The Face     Lacunar Stroke     Obesity     Osteopenia     Essential hypertension with goal blood pressure less than 140/90     Coronary Artery Disease     Paroxysmal Supraventricular Tachycardia     Palpitations     Hypercholesteremia     Lower Back Pain     Spinal stenosis of lumbar region with radiculopathy, L5 to S1     Weakness of both lower extremities     Back pain     GERD (gastroesophageal reflux disease)     Insomnia, unspecified type     Lower extremity weakness     Scoliosis of thoracolumbar spine     Left lumbar radiculopathy     Pulmonary embolism     Other acute pulmonary embolism without acute cor pulmonale       Past Surgical History  She has a past surgical history that includes arthroplasty tibial plateau; boggs w/o facetec foramot/dskc /2 vrt seg, cervical; Appendectomy;  section; ablate heart dysrhythm focus; Cardiac catheterization; and Coronary angioplasty.       Medications and Allergies   Current Outpatient Prescriptions   Medication Sig     acetaminophen (TYLENOL) 325 MG tablet Take 2 tablets (650 mg total) by mouth every 4 (four) hours as needed.     amLODIPine (NORVASC) 5 MG tablet Take 1 tablet (5 mg total) by mouth daily.     CALCIUM CARB/MAGNESIUM OXID/D3 (CALCIUM MAGNESIUM + D ORAL) Take by mouth 2  (two) times a day. 750/300/500mg     GLUCOSAMINE/CHONDROITIN SULF A (GLUCOSAMINE-CHONDROITIN ORAL) Take 2 tablets by mouth daily.     indapamide (LOZOL) 2.5 MG tablet TAKE 1 TABLET EVERY DAY     losartan (COZAAR) 100 MG tablet Take 1 tablet (100 mg total) by mouth daily.     lutein 20 mg cap Take 1 capsule by mouth daily.     multivitamin (ONE A DAY) per tablet Take 1 tablet by mouth daily.     nitroglycerin (NITROSTAT) 0.4 MG SL tablet Place 1 tablet (0.4 mg total) under the tongue as needed for chest pain (Keep in original bottle).     OMEGA-3/DHA/EPA/FISH OIL (FISH OIL-OMEGA-3 FATTY ACIDS) 300-1,000 mg capsule Take 1 g by mouth daily.     omeprazole (PRILOSEC) 20 MG capsule Take 20 mg by mouth daily as needed.      triamcinolone (KENALOG) 0.1 % cream Apply topically 2 (two) times a day as needed.     warfarin (COUMADIN) 5 MG tablet Take 1 tablet by mouth daily. Adjust dose based on INR results as directed.     ondansetron (ZOFRAN) 8 MG tablet Take 0.5 tablets (4 mg total) by mouth every 8 (eight) hours as needed.     senna-docusate (PERICOLACE) 8.6-50 mg tablet Take 1 tablet by mouth 2 (two) times a day.     Allergies   Allergen Reactions     Oxycodone Nausea And Vomiting     Codeine Nausea And Vomiting     Diazepam Nausea Only     Morphine Nausea And Vomiting        Family and Social History   Family History   Problem Relation Age of Onset     Stroke Mother 88     Hypertension Father      Sudden death Father 70     suspected heart attack      Breast cancer Sister      Venous thrombosis Sister      At age 80, and had breast CA        Social History   Substance Use Topics     Smoking status: Never Smoker     Smokeless tobacco: Never Used     Alcohol use No        Physical Exam       Physical Exam  /80  Pulse 64  Wt 162 lb 12 oz (73.8 kg)  BMI 28.83 kg/m2  General appearance: alert, appears stated age, cooperative, no distress and tired looking  Head: Normocephalic, without obvious abnormality,  atraumatic  Throat: lips, mucosa, and tongue normal; teeth and gums normal  Neck: no adenopathy, no carotid bruit, no JVD, supple, symmetrical, trachea midline and thyroid not enlarged, symmetric, no tenderness/mass/nodules  Lungs: clear to auscultation bilaterally  Heart: regular rate and rhythm, S1, S2 normal, no murmur, click, rub or gallop  Abdomen: soft, non-tender; bowel sounds normal; no masses,  no organomegaly  Extremities: extremities normal, atraumatic, no cyanosis or edema  Skin: Skin color, texture, turgor normal. No rashes or lesions  Neurologic: Grossly normal     Additional Information        Pancho Banks MD  Internal Medicine  Contact me at [phone]     Additional Information   Current Outpatient Prescriptions   Medication Sig     acetaminophen (TYLENOL) 325 MG tablet Take 2 tablets (650 mg total) by mouth every 4 (four) hours as needed.     amLODIPine (NORVASC) 5 MG tablet Take 1 tablet (5 mg total) by mouth daily.     CALCIUM CARB/MAGNESIUM OXID/D3 (CALCIUM MAGNESIUM + D ORAL) Take by mouth 2 (two) times a day. 750/300/500mg     GLUCOSAMINE/CHONDROITIN SULF A (GLUCOSAMINE-CHONDROITIN ORAL) Take 2 tablets by mouth daily.     indapamide (LOZOL) 2.5 MG tablet TAKE 1 TABLET EVERY DAY     losartan (COZAAR) 100 MG tablet Take 1 tablet (100 mg total) by mouth daily.     lutein 20 mg cap Take 1 capsule by mouth daily.     multivitamin (ONE A DAY) per tablet Take 1 tablet by mouth daily.     nitroglycerin (NITROSTAT) 0.4 MG SL tablet Place 1 tablet (0.4 mg total) under the tongue as needed for chest pain (Keep in original bottle).     OMEGA-3/DHA/EPA/FISH OIL (FISH OIL-OMEGA-3 FATTY ACIDS) 300-1,000 mg capsule Take 1 g by mouth daily.     omeprazole (PRILOSEC) 20 MG capsule Take 20 mg by mouth daily as needed.      triamcinolone (KENALOG) 0.1 % cream Apply topically 2 (two) times a day as needed.     warfarin (COUMADIN) 5 MG tablet Take 1 tablet by mouth daily. Adjust dose based on INR results as  directed.     ondansetron (ZOFRAN) 8 MG tablet Take 0.5 tablets (4 mg total) by mouth every 8 (eight) hours as needed.     senna-docusate (PERICOLACE) 8.6-50 mg tablet Take 1 tablet by mouth 2 (two) times a day.     Allergies   Allergen Reactions     Oxycodone Nausea And Vomiting     Codeine Nausea And Vomiting     Diazepam Nausea Only     Morphine Nausea And Vomiting     Social History   Substance Use Topics     Smoking status: Never Smoker     Smokeless tobacco: Never Used     Alcohol use No         Time: total time spent with the patient was 40 minutes of which >50% was spent in counseling and coordination of care

## 2021-06-12 NOTE — TELEPHONE ENCOUNTER
ANTICOAGULATION  MANAGEMENT PROGRAM    Jaclyn Gibbs is overdue for INR check.     Spoke with Jaclyn and scheduled INR appointment on 11/4/20 @ [facility].      Dory Montenegro RN

## 2021-06-12 NOTE — PROGRESS NOTES
Office Visit - Follow Up   Jaclyn Gibbs   84 y.o. female    Date of Visit: 8/18/2017    Chief Complaint   Patient presents with     Hospital Visit Follow Up        Assessment and Plan   1. Coronary atherosclerosis due to lipid rich plaque  She has stopped aspirin since starting warfarin.  Consider addition of statin per Dr. Pancho Banks    2. Other acute pulmonary embolism without acute cor pulmonale  Resume warfarin, enroll in anticoagulation program, offered novel anticoagulant and she declined  - Ambulatory referral to Anticoagulation Monitoring  - INR    3. Loss of appetite  We discussed that this may be secondary to warfarin but I stressed importance that she continue to take warfarin.  We also discussed that she had a blood clot in her lungs and it will take a little bit of time for her to feel better.  Certainly if her symptoms worsen she will return to clinic sooner but she does have an appointment with Dr. Pancho Banks in 1 week      Return in about 1 week (around 8/25/2017) for follow up with PCP.     History of Present Illness   This 84 y.o. old woman comes in for follow-up of pulmonary embolus.  She is admitted earlier this month after acute onset of shortness of breath.  She was discharged on Lovenox and warfarin.  She followed up with Dr. Arnulfo Wolff and her INR was 2.3.  Lovenox was discontinued and warfarin dose was decreased from 5 mg daily down to about 2.5 mg daily.  She did this for a few days and then actually for the past couple of days and stopped warfarin.  She stopped because she is felt a little short of breath had some heaviness in her legs and has felt a bit nauseated and loss of appetite.  She did in fact feel better after stopping warfarin and wonders if she needs to continue.  She is a history of blood clot back in 2008.  She has no fever chills no cough.  She has no neck stiffness no headache no confusion.  No weakness focally or numbness.    Review of Systems: A  "comprehensive review of systems was negative except as noted.     Medications, Allergies and Problem List   Reviewed and updated     Physical Exam   General Appearance:   No acute distress    /62 (Patient Site: Left Arm, Patient Position: Sitting, Cuff Size: Adult Regular)  Pulse 78  Ht 5' 3\" (1.6 m)  Wt 162 lb (73.5 kg)  SpO2 98%  BMI 28.7 kg/m2    HEENT exam is unremarkable  Neck supple no thyromegaly or nodule palpable  Lymphatic no cervical lymphadenopathy  Cardiovascular regular rate and rhythm no murmur gallop or rub  Pulmonary lungs are clear to auscultation bilaterally  Gastrointestinall abdomen soft nontender nondistended no organomegaly  Neurologic exam is non focal  Psychiatric pleasant, no confusion or agitation        Additional Information   Current Outpatient Prescriptions   Medication Sig Dispense Refill     acetaminophen (TYLENOL) 325 MG tablet Take 2 tablets (650 mg total) by mouth every 4 (four) hours as needed.  0     amLODIPine (NORVASC) 5 MG tablet Take 1 tablet (5 mg total) by mouth daily. 90 tablet 3     CALCIUM CARB/MAGNESIUM OXID/D3 (CALCIUM MAGNESIUM + D ORAL) Take by mouth 2 (two) times a day. 750/300/500mg       GLUCOSAMINE/CHONDROITIN SULF A (GLUCOSAMINE-CHONDROITIN ORAL) Take 2 tablets by mouth daily.       indapamide (LOZOL) 2.5 MG tablet TAKE 1 TABLET EVERY DAY 90 tablet 3     losartan (COZAAR) 100 MG tablet Take 1 tablet (100 mg total) by mouth daily. 90 tablet 3     lutein 20 mg cap Take 1 capsule by mouth daily.       multivitamin (ONE A DAY) per tablet Take 1 tablet by mouth daily.       nitroglycerin (NITROSTAT) 0.4 MG SL tablet Place 1 tablet (0.4 mg total) under the tongue as needed for chest pain (Keep in original bottle). 45 tablet 6     OMEGA-3/DHA/EPA/FISH OIL (FISH OIL-OMEGA-3 FATTY ACIDS) 300-1,000 mg capsule Take 1 g by mouth daily.       omeprazole (PRILOSEC) 20 MG capsule Take 20 mg by mouth daily as needed.        ondansetron (ZOFRAN) 8 MG tablet Take 0.5 " tablets (4 mg total) by mouth every 8 (eight) hours as needed. 5 tablet 0     senna-docusate (PERICOLACE) 8.6-50 mg tablet Take 1 tablet by mouth 2 (two) times a day.  0     triamcinolone (KENALOG) 0.1 % cream Apply topically 2 (two) times a day as needed. 30 g 3     warfarin (COUMADIN) 5 MG tablet Take 1 tablet by mouth daily. Adjust dose based on INR results as directed. 30 tablet 0     No current facility-administered medications for this visit.      Allergies   Allergen Reactions     Oxycodone Nausea And Vomiting     Codeine Nausea And Vomiting     Diazepam Nausea Only     Morphine Nausea And Vomiting     Social History   Substance Use Topics     Smoking status: Never Smoker     Smokeless tobacco: Never Used     Alcohol use No       Review and/or order of clinical lab tests:  Review and/or order of radiology tests:  Review and/or order of medicine tests:  Discussion of test results with performing physician:  Decision to obtain old records and/or obtain history from someone other than the patient:  Review and summarization of old records and/or obtaining history from someone other than the patient and.or discussion of case with another health care provider:  Independent visualization of image, tracing or specimen itself:    Time:      Robin Catherien MD

## 2021-06-12 NOTE — PROGRESS NOTES
Office Visit - Follow up    Jaclyn Gibbs   84 y.o. female    Date of Visit: 8/14/2017    Chief Complaint   Patient presents with     Edema     Left ankle and foot swollen since Friday     Follow-up     Not feeling well, no appetite       Subjective: Delightful 84-year-old patient of Dr. Banks recently admitted to the hospital with symptoms of dyspnea.  History of SVT coronary disease and hypertension    Ultimately found to have a pulmonary embolus acute right lower lobe.  Was treated initially with Lovenox and started on warfarin    This is her fifth day of warfarin at 5 mg daily    Hospital course reviewed in detail as well as the notes by Dr. Queen.  Current medications reviewed discussed and reconciled.  She is not feeling well she has had problems with generalized weakness and some nausea.  Was given tramadol for pain although has had minimal pain I will have her stop tramadol.    INR is therapeutic at 2.3.  I do feel it is likely that 5 mg daily will result in a supra therapeutic INR.  I will have her take 2.5 mg Tuesday Thursday Saturday and 5 mg other days she will be seen Dr. Banks late next week I believe on the 24th INR should be repeated at that time    As noted current medications reviewed we will stop both Lovenox and tramadol    She does not have any symptoms of dyspnea or of chest pain.  Blood pressures have been stable she has noted poor appetite although has not been taking her Zofran for nausea.    ROS: A comprehensive review of systems was performed and was otherwise negative except as mentioned above.     Exam  Alert and oriented no bruising EENT negative extremities trace edema more prominent on the left side.  O2 saturation on room air 95% no other changes heart negative lungs clear abdomen soft   /80  Pulse 78    Assessment and Plan  Recent pulmonary embolus her INR is therapeutic we are stopping Lovenox treatment schedule as noted above Dr. Banks will enroll her in the  anticoagulation monitoring protocol    Generally feeling poorly we will stop tramadol and Lovenox encourage oral fluids stop any nonessential medications for now follow-up with Dr. Banks otherwise doing well vitals stable        Jaclyn was seen today for edema and follow-up.    Diagnoses and all orders for this visit:    Coronary atherosclerosis due to lipid rich plaque  -     INR; Future  -     INR    Acute thromboembolism of deep veins of lower extremity    Other acute pulmonary embolism without acute cor pulmonale    Essential hypertension with goal blood pressure less than 140/90    Spinal stenosis of lumbar region with radiculopathy, L5 to S1    Weakness of both lower extremities    GERD (gastroesophageal reflux disease)          Time: total time spent with the patient was 25 minutes of which >50% was spent in counseling and coordination of care        Allergies   Allergen Reactions     Oxycodone Nausea And Vomiting     Codeine Nausea And Vomiting     Diazepam Nausea Only     Morphine Nausea And Vomiting       Medications :  Prior to Admission medications    Medication Sig Start Date End Date Taking? Authorizing Provider   acetaminophen (TYLENOL) 325 MG tablet Take 2 tablets (650 mg total) by mouth every 4 (four) hours as needed. 8/11/17  Yes Eugene Queen MD   amLODIPine (NORVASC) 5 MG tablet Take 1 tablet (5 mg total) by mouth daily. 5/19/17  Yes Pancho Banks MD   CALCIUM CARB/MAGNESIUM OXID/D3 (CALCIUM MAGNESIUM + D ORAL) Take by mouth 2 (two) times a day. 750/300/500mg   Yes PROVIDER, HISTORICAL   enoxaparin (LOVENOX) 300 mg/3 mL Soln Inject 0.7 mL (70 mg total) under the skin every 12 (twelve) hours for 5 days. 8/11/17 8/16/17 Yes Eugene Queen MD   GLUCOSAMINE/CHONDROITIN SULF A (GLUCOSAMINE-CHONDROITIN ORAL) Take 2 tablets by mouth daily.   Yes Pancho Banks MD   indapamide (LOZOL) 2.5 MG tablet TAKE 1 TABLET EVERY DAY 5/19/17  Yes Pancho Banks MD   losartan (COZAAR) 100  MG tablet Take 1 tablet (100 mg total) by mouth daily. 17  Yes Pancho Banks MD   lutein 20 mg cap Take 1 capsule by mouth daily.   Yes PROVIDER, HISTORICAL   multivitamin (ONE A DAY) per tablet Take 1 tablet by mouth daily.   Yes PROVIDER, HISTORICAL   nitroglycerin (NITROSTAT) 0.4 MG SL tablet Place 1 tablet (0.4 mg total) under the tongue as needed for chest pain (Keep in original bottle). 5/3/17  Yes Stacey Cardona MD   OMEGA-3/DHA/EPA/FISH OIL (FISH OIL-OMEGA-3 FATTY ACIDS) 300-1,000 mg capsule Take 1 g by mouth daily.   Yes PROVIDER, HISTORICAL   omeprazole (PRILOSEC) 20 MG capsule Take 20 mg by mouth daily as needed.    Yes Pancho Banks MD   ondansetron (ZOFRAN) 8 MG tablet Take 0.5 tablets (4 mg total) by mouth every 8 (eight) hours as needed. 17  Yes Eugene Queen MD   senna-docusate (PERICOLACE) 8.6-50 mg tablet Take 1 tablet by mouth 2 (two) times a day. 17  Yes Eugene Queen MD   traMADol (ULTRAM) 50 mg tablet Take 1 tablet (50 mg total) by mouth every 6 (six) hours as needed. 17  Yes Eugene Queen MD   triamcinolone (KENALOG) 0.1 % cream Apply topically 2 (two) times a day as needed. 17  Yes Pancho Banks MD   warfarin (COUMADIN) 5 MG tablet Take 1 tablet by mouth daily. Adjust dose based on INR results as directed. 17  Yes Eugene Queen MD        Past Medical History:   Past Medical History:   Diagnosis Date     Arrhythmia      Basal cell carcinoma      Coronary artery disease      DVT (deep venous thrombosis)      GERD (gastroesophageal reflux disease)      Hyperlipidemia      Hypertension      Paroxysmal supraventricular tachycardia      Stroke      Vertigo        Past Surgical History:   Past Surgical History:   Procedure Laterality Date     APPENDECTOMY       CARDIAC CATHETERIZATION        SECTION       CORONARY ANGIOPLASTY       NE ABLATE HEART DYSRHYTHM FOCUS      Description: Catheter Ablation Atrial  Supraventricular Tachycardia;  Proc Date: 04/07/2014;  Comments: typical AVNRT,  successful cryoablation     DC ARTHROPLASTY TIBIAL PLATEAU      Description: Knee Replacement;  Recorded: 10/27/2008;  Comments: right, 1/23/03.     DC CESPEDES W/O FACETEC FORAMOT/DSKC 1/2 VRT SEG, CERVICAL      Description: Laminectomy Lumbar;  Recorded: 07/24/2013;       Social History:   Social History     Social History     Marital status:      Spouse name: N/A     Number of children: 6     Years of education: N/A     Occupational History     Not on file.     Social History Main Topics     Smoking status: Never Smoker     Smokeless tobacco: Never Used     Alcohol use No     Drug use: No     Sexual activity: Not on file     Other Topics Concern     Not on file     Social History Narrative    , 6 children, active, is a full code.  (last updated 8/9/2017)        Family History:   Family History   Problem Relation Age of Onset     Stroke Mother 88     Hypertension Father      Sudden death Father 70     suspected heart attack      Breast cancer Sister      Venous thrombosis Sister      At age 80, and had breast CA         Arnulfo Wolff MD

## 2021-06-12 NOTE — TELEPHONE ENCOUNTER
ANTICOAGULATION  MANAGEMENT PROGRAM    Jaclyn Gibbs is overdue for INR check.     Left message to call and schedule INR appointment as soon as possible.      Dory Montenegro RN

## 2021-06-13 NOTE — PATIENT INSTRUCTIONS - HE
This clinic will not be in network for [facility] starting in January 2021.  Please call [facility] to find out who will be in network for you that you can see for your spine care.    A left L5-S1 TFESI has been ordered today.  Schedule 2 weeks from now to allow time for insurance authorization.  If you have questions, the number to call is [phone]    Please note that this injection uses cortisone.  The cortisone may somewhat weaken the immune system.  It is unknown how much the immune system is weakened.  It is unknown if it is weakened to the point that you may be more likely to get the COVID-19 virus, or if you do get the COVID-19 virus, if you would be sicker than you would have been if you had not had the cortisone injection.  If you do not wish to proceed with the injection, please let the nurse/physician know and do NOT schedule the injection.    Please note that since your immune system is weakened from the cortisone, having a flu vaccine/shot may be less effective if you have this vaccine within 2 weeks from your cortisone injection.  It is advised to wait 2 weeks after your cortisone injection to have the flu shot (or if you have the flu beau.t first, wait 2 weeks before you have the cortisone injection).    Please schedule this injection at least 2 weeks  from now to allow time for insurance prior authorization.  On the day of your injection, you cannot be sick or taking antibiotics.  If you become sick and are prescribed, please call the clinic so your injection can be rescheduled for once you have completed your antibiotics.  You will need to bring a  with you for your injection.   If you have any questions or concerns prior to your injection, please do not hesitate to call the nurse navigation line at [phone] or contact Dr. Torres through [employer].

## 2021-06-13 NOTE — PROGRESS NOTES
Assessment:   Jaclyn Gibbs is a 88 y.o. y.o. female with past medical history significant for pulmonary embolism (on Coumadin), coronary artery disease, hyperlipidemia, hypertension, osteopenia who presents today for follow-up regarding chronic left leg pain with concern for left L5 radiculopathy secondary to great toe extensor weakness, thought to be from lumbar foraminal stenosis at the L5-S1 level.  Patient received good relief following her left L5-S1 transforaminal epidural steroid injection on September 15 of 2020.  She has had return of pain in the last week, that is severe and worsening.  Her neurologic status is stable and she is without any red flag symptoms.    PSP:  Dr. Torres       Plan:     A shared decision making plan was used.  The patient's values and choices were respected.  The following represents what was discussed and decided upon by the physician and the patient.      1.  DIAGNOSTIC TESTS:    -The patient's MRI of the lumbar spine from May 31 of 2017 was personally reviewed today by the physician with the physician performing her own interpretation.  She does have a significant scoliosis of the lumbar spine.  She has significant right L2-3 foraminal stenosis.  There is moderate central canal stenosis with right greater than left L3-4 foraminal stenosis.  At the L4-5 level, there is severe facet arthropathy.  There is significant left lateral recess stenosis.  There is left greater than right foraminal stenosis at the L4-5 level.  At the L5-S1 level, there is no thickened facet arthropathy.  There is severe left L5-S1 foraminal stenosis.  -Updated imaging of the lumbar spine is not thought to be warranted at this time.  Patient is not interested in undergoing any sort of surgical intervention and she has had good relief with continued left L5-S1 transforaminal epidural steroid injections.  2.  PHYSICAL THERAPY: The patient states she did not do physical therapy for the neck earlier  this year.  The last time that she did physical therapy for her low back was several years ago.  She remains active, but could be more diligent in doing her home exercise program.  3.  MEDICATIONS: She can continue to take over-the-counter Tylenol.  She should not exceed 3000 mg in a 24-hour period.  4.  INTERVENTIONS: Patient would like a repeat left L5-S1 transforaminal epidural steroid injection.  She thought that the appointment today was for the injection.  Dr. Torres explained that this appointment was made as an office visit.  There has not been prior authorization for the injection.  This process will be started today after she schedules the appointment for 2 weeks out.  She was advised to call the nurse navigation line in the future if she wishes to have a repeat injection.  5.  PATIENT EDUCATION:    -Dr. Torres explained that [facility] will not be in network starting in January 2021.  She will either need to switch insurance plans in order to continue care at this clinic or she will need to find a spine center that is in network with [facility].  She was encouraged to call [facility] to find out who will be in network, as it sounds like she does not wish to change her health insurance plan at this time.  -Dr. Torres explained that until December, she has any questions or concerns she is asked to call the nurse navigation line as opposed to the .  The nurse navigation team will help make sure that she gets the care that she needs.  6.  FOLLOW-UP: Patient will follow-up in 2 weeks for the left L5-S1 transforaminal epidural steroid injection.  If authorization is obtained sooner than this, then the injection can be performed sooner.  Dr. Torres will message the PA team and ask them to rush the authorization.  If there are any questions/concerns or any significant worsening of pain prior to that time, the patient is asked to call the clinic via the nurse navigation line or via [employer].     Subjective:      Jaclyn Gibbs is a 88 y.o. female who presents today for follow-up regarding chronic left leg pain thought to be from lumbar foraminal stenosis.  Reports that she had good relief with a left L5-S1 transforaminal epidural steroid injection on September 15 of 2020.  Reports that the pain came back in the last week.  It is a same in nature as it has been previously, but it is fairly severe.  It is mainly localized in the left lateral lower leg, just like it always is.  No significant back pain or pain more proximal in the leg.  She rates her pain at an 8 out of 10.  At worst is an 8 out of 10.  At best is a 3 out of 10.  Her pain is worse with walking and standing.  She does feel better with resting and sitting.  She denies any new symptoms at this time.  She has not gone to physical therapy in quite some time, though she tries to remain active.  She is taking Tylenol for pain.  She remains on Coumadin.    Past medical history is reviewed and is unchanged for any new medical diagnoses in the interim.      Family history is reviewed and is unchanged in the interim.      Review of Systems:   Pertinent negatives include no numbness, tingling, weakness, headaches fevers, chills, unexplained weight loss, bowel incontinence, bladder incontinence, trips, stumbles, falls.  All others reviewed and are negative.     Objective:   CONSTITUTIONAL:  Vital signs as above.  No acute distress.  The patient is well nourished and well groomed.    PSYCHIATRIC:  The patient is awake, alert, oriented to person, place and time.  The patient is answering questions appropriately with clear speech.  Normal affect.  SKIN:  Skin over the face, posterior torso, bilateral upper and lower extremities is clean, dry, intact without rashes.  MUSCULOSKELETAL:  Gait is non-antalgic.  She can transfer independently from the table to the exam table.she has 4/5 strength for the left great toe extensor but 5/5 strength of the right great toe  extensor.  The patient has 5/5 strength for the bilateral hip flexors, knee flexors/extensors, ankle dorsiflexors/plantar flexors, ankle evertors/invertors.  Negative seated straight leg raising test.  NEUROLOGICAL: 2/4 patellar, trace medial hamstring, trace achilles reflexes which are symmetric bilaterally.  No ankle clonus bilaterally.  Sensation to light touch is intact in the bilateral L4, L5, and S1 dermatomes.

## 2021-06-13 NOTE — CONSULTS
[facility] Hematology and Oncology Consult Note    Patient: Jaclyn Gibbs  MRN: [id number]  Date of Service: 10/02/2017        Reason for Visit    I was consulted by Dr. Banks regarding pulmonary embolism.    Assessment/Plan    Ms. Jaclyn Gbibs is a 85 y.o. woman who was diagnosed to have a right lower lobe subsegmental artery pulmonary embolism on 8/9/17.  She woke up from sleep feeling a bit short of breath and having right sided lower rib pain.  She has been started on warfarin anticoagulation and she has been tolerating it well so far.  The shortness of breath and pain has completely disappeared.  She does have a history of having a left leg peroneal vein DVT that was diagnosed on 11/19/2008,  14 days after she had a left knee replacement surgery.  At that time this was treated as a provoked DVT appropriately and she was on warfarin for a period of time.  She cannot remember exactly how long.  In between she has not had any other clotting problems.  There is no family history of blood clots.  She had a problem with PSVT but she has not had any recent episodes of palpitation after she had the ablation done.  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  I have gone through her past medical records in detail.  I have reviewed her labs and I have personally reviewed the imaging reports.    1.  I had a long discussion with her today.  I discussed with her that the crux of the question is whether this pulmonary embolism can be considered a provoked or whether it should be considered unprovoked.  She does have a history of having had an injection to the lower back was sciatica on June 26 and also of frequent car trips but none of these activities were really out of the ordinary for her.  Thus it is difficult to know whether this pulmonary embolism was provoked or unprovoked.  The venous ultrasound did not show a DVT in her legs.  She did not have any symptoms in  her arms.  While the DVT that she had in her peroneal veins in 2008 was small and clearly provoked after the knee replacement surgery that does increase our concern about recurrence.  I discussed with her that if this is clearly a provoked blood clot I would have recommended only 3-6 months of anticoagulation but with her previous history I would favor a longer period of anticoagulation.  On the other hand it appears that she is starting to have some problems with unsteadiness of the feet and also feeling lightheaded.  As time goes on she may have an increased risk of falls.  In that situation warfarin anticoagulation will put her at increased risk of bleeding.  We need to balance out these problems.      -- Thus at this point I would recommend at least 6 months of anticoagulation but I would favor lifelong anticoagulant.  Down the line if she becomes more unsteady on her feet and is at risk of falling, that would be the time I think to stop anticoagulant.  She was in agreement with the plan.    2.  With her lack of family history, I do not think that doing a hypercoagulable workup would be useful for her.  If anything is positive it will still only be in favor of continuing anticoagulation.    3.  I discussed with her about the measures that she can take to prevent blood clots when she is on a long car trip.  We discussed about stopping and getting out and walking around every 1 or 2 hours in a long trip.  Using compression stockings and exercising the legs in her seat.    4.  She was given the seasonal flu vaccine today.    5.  At this point she does not need ongoing hematology follow-up.  I remain available for any assistance down the line.  She has the contact information for the [facility] cancer clinic.          ECOG Performance   ECOG Performance Status: 1  Distress Assessment  Distress Assessment Score: No distress        Problem List    1. Other acute pulmonary embolism without acute cor pulmonale              CC: Pancho aBnks MD      ______________________________________________________________________________      History    Ms. Jaclyn Gibbs is an 85-year-old woman who is here for the consultation alone.    She says that she woke up on 8/9/17 feeling short of breath.  She also had some pain in the right lower ribs.  The shortness of breath increased and she went to the ER.  A CT scan of the chest showed a right lower lobe pulmonary embolism in a subsegmental branch.  A venous ultrasound of the legs did not show a DVT.  She does not remember anything specific that could have provoked the blood clot.  She does not remember any swelling of her arms or legs or pain.  She did have an injection to the lumbar spine for sciatica on June 26.  She says that she travels frequently to [place], [place] to visit with her son and family frequently in the car.  This is an hours drive one way.  Her  drives.  She does not remember anything particular about the trips before she was diagnosed with a pulmonary embolism.  She continues on these long trips in the car.  Recently they were visiting [place] on vacation and they were driving around quite a bit and had no problems.    She has been on warfarin anticoagulation since the pulmonary embolism.  She feels that she is tolerating it okay.  She has had no bleeding problems.  The recent INR was a bit below therapeutic.  She is waiting for a call to adjust the dose.    He was diagnosed to have a left peroneal vein DVT on 11/19/2008 exactly 2 weeks after the left knee replacement surgery.  She remembers being on warfarin anticoagulation for a period of time after that.  She cannot lumbar how long she was on warfarin.  She says that she tolerated anti-coagulation well at that time.  She has not had any other blood clots in the interim.    She has a long-standing problem with back pain and sciatica which radiates down the left leg.  This limits her activity  somewhat.  She finds it difficult to be on her feet for a long time.  She and her  live in a 1 level condo.  She is able to do some of the shopping and quite a bit over the work at home except for a while combing.    She has some neuropathy of the bottom of her feet with some numbness and tingling.  She says that she also feels a bit unsteady on her feet.  She feels episodically lightheaded.  She says that she is worried that she may fall and is very careful.  She has not actually had any falls.    Review of systems.  No fever or night sweats.  No loss of weight.  No lumps or bumps anywhere.  No unusual headaches or eyesight issues.  No bleeding from the nose.  No sores in the mouth. No problems with swallowing.  No chest pain. No shortness of breath. No cough.  No abdominal pain. No nausea or vomiting.  No diarrhea or constipation.  No blood in stool or black colored stools.  She has an overactive bladder at night.  No skin rashes.  A 14 point review of systems is otherwise negative.    Past History  Past Medical History:   Diagnosis Date     Acute pancreatitis 2013     Basal cell carcinoma      Carpal tunnel syndrome      Coronary artery disease      CVA (cerebral vascular accident) 2009    Posterior limb right internal capsule ischemic stroke.     Hyperlipidemia      Hypertension      Lumbar spondylosis      Osteoarthritis      Osteopenia 2016     Overactive bladder      Paroxysmal supraventricular tachycardia      Peroneal DVT (deep venous thrombosis), left 2008    after left knee replacement. Was on warfarin for some time.     Pulmonary embolism on right 2017     Scoliosis      Vertigo      Past Surgical History:   Procedure Laterality Date     APPENDECTOMY       BASAL CELL CARCINOMA EXCISION      upper back and left chin     CARDIAC CATHETERIZATION       CATARACT EXTRACTION Bilateral       SECTION       CORONARY ANGIOPLASTY  2010    stent placed to the first  "diagonal     LUMBAR LAMINECTOMY  2009    L4-L5.     MO ABLATE HEART DYSRHYTHM FOCUS  05/07/2014    Description: Catheter Ablation Atrial Supraventricular Tachycardia;  Comments: typical AVNRT,  successful cryoablation     TOTAL KNEE ARTHROPLASTY Right 01/23/2003     TOTAL KNEE ARTHROPLASTY Left 11/05/2008     Family History   Problem Relation Age of Onset     Stroke Mother 88     Hypertension Father      Sudden death Father 70     suspected heart attack      Breast cancer Sister      Venous thrombosis Sister 80     At age 80, and had breast CA     Cirrhosis Sister 34     Aneurysm Sister      Other Brother 25     Killed in WWII.     Brain cancer Daughter 5     No Medical Problems Daughter      No Medical Problems Son      No Medical Problems Son      No Medical Problems Son      Dementia Sister      Social History     Social History     Marital status:      Spouse name: N/A     Number of children: 6     Years of education: N/A     Occupational History     Retired.      [employer] before she retired.Retired at 72.     Social History Main Topics     Smoking status: Never Smoker     Smokeless tobacco: Never Used     Alcohol use No     Drug use: No     Sexual activity: Not Asked     Other Topics Concern     None     Social History Narrative    , 6 children, active, is a full code.  (last updated 8/9/2017)      Allergies    Allergies   Allergen Reactions     Oxycodone Nausea And Vomiting     Codeine Nausea And Vomiting     Diazepam Nausea Only     Morphine Nausea And Vomiting     Physical Exam    Recent Vitals 10/2/2017   Height 5' 3\"   Weight 168 lbs 11 oz   BSA (m2) 1.84 m2   /76   Pulse 88   Temp 98.7   Temp src -   SpO2 93   Some recent data might be hidden       GENERAL: Alert and oriented. Seated comfortably. In no distress.  Well-built.  She looks well overall.  She appears unsteady on her feet moving from the chair to the examining table.    HEAD: Atraumatic and normocephalic.  Has a full head " of hair.    EYES: BENNIE, EOMI.  No pallor.  No icterus.    Oral cavity: no mucosal lesion or tonsillar enlargement.    NECK: supple. JVP normal.  No thyroid enlargement.    LYMPH NODES: No palpable, cervical, axillary or inguinal lymphadenopathy.    CHEST: clear to auscultation bilaterally.  Resonant to percussion throughout bilaterally.  Symmetrical breath movements bilaterally.    CVS: S1 and S2 are heard. Regular rate and rhythm.  No murmur or gallop or rub heard.    ABDOMEN: Soft. Not tender. Not distended.  No palpable hepatomegaly or splenomegaly.  No other mass palpable.  Bowel sounds heard.    EXTREMITIES: Warm.  No peripheral edema.  She has some arthritic changes in the small joints of her hands.    SKIN: no rash, or bruising or purpura.      Lab Results  Results for EVI BARRON (MRN [id number]) as of 10/2/2017 13:32   Ref. Range 8/9/2017 17:13 8/25/2017 11:15   WBC Latest Ref Range: 4.0 - 11.0 thou/uL 9.3 4.7   RBC Latest Ref Range: 3.80 - 5.40 mill/uL 4.35 4.66   Hemoglobin Latest Ref Range: 12.0 - 16.0 g/dL 14.0 14.2   Hematocrit Latest Ref Range: 35.0 - 47.0 % 40.6 42.5   MCV Latest Ref Range: 80 - 100 fL 93 91   MCH Latest Ref Range: 27.0 - 34.0 pg 32.2 30.4   MCHC Latest Ref Range: 32.0 - 36.0 g/dL 34.5 33.3   RDW Latest Ref Range: 11.0 - 14.5 % 13.5 12.0   Platelets Latest Ref Range: 140 - 440 thou/uL 233 433   MPV Latest Ref Range: 7.0 - 10.0 fL 10.1 7.0   Neutrophils % Latest Ref Range: 50 - 70 % 77 (H)    Lymphocytes % Latest Ref Range: 20 - 40 % 11 (L)    Monocytes % Latest Ref Range: 2 - 10 % 10    Eosinophils % Latest Ref Range: 0 - 6 % 2    Basophils % Latest Ref Range: 0 - 2 % 0    Neutrophils Absolute Latest Ref Range: 2.0 - 7.7 thou/uL 7.1    Lymphocytes Absolute Latest Ref Range: 0.8 - 4.4 thou/uL 1.1    Monocytes Absolute Latest Ref Range: 0.0 - 0.9 thou/uL 0.9    Eosinophils Absolute Latest Ref Range: 0.0 - 0.4 thou/uL 0.1    Basophils Absolute Latest Ref Range: 0.0 - 0.2  thou/uL 0.0    Results for EVI BARRON (MRN [id number]) as of 10/2/2017 13:32   Ref. Range 8/25/2017 11:15   Sodium Latest Ref Range: 136 - 145 mmol/L 140   Potassium Latest Ref Range: 3.5 - 5.0 mmol/L 3.6   Chloride Latest Ref Range: 98 - 107 mmol/L 101   CO2 Latest Ref Range: 22 - 31 mmol/L 28   Anion Gap, Calculation Latest Ref Range: 5 - 18 mmol/L 11   BUN Latest Ref Range: 8 - 28 mg/dL 17   Creatinine Latest Ref Range: 0.60 - 1.10 mg/dL 0.87   GFR MDRD Af Amer Latest Ref Range: >60 mL/min/1.73m2 >60   GFR MDRD Non Af Amer Latest Ref Range: >60 mL/min/1.73m2 >60   Calcium Latest Ref Range: 8.5 - 10.5 mg/dL 9.9   AST Latest Ref Range: 0 - 40 U/L 26   ALT Latest Ref Range: 0 - 45 U/L 22   ALBUMIN Latest Ref Range: 3.5 - 5.0 g/dL 3.4 (L)   Protein, Total Latest Ref Range: 6.0 - 8.0 g/dL 7.5   Cholesterol Latest Ref Range: <=199 mg/dL 194   Alkaline Phosphatase Latest Ref Range: 45 - 120 U/L 67   Bilirubin, Total Latest Ref Range: 0.0 - 1.0 mg/dL 0.4   Bilirubin, Direct Latest Ref Range: <=0.5 mg/dL 0.1   Triglycerides Latest Ref Range: <=149 mg/dL 107   HDL Cholesterol Latest Ref Range: >=50 mg/dL 56   LDL Calculated Latest Ref Range: <=129 mg/dL 117   Glucose Latest Ref Range: 70 - 125 mg/dL 91   Results for EVI BARRON (MRN [id number]) as of 10/2/2017 13:32   Ref. Range 8/9/2017 17:13   INR Latest Ref Range: 0.90 - 1.10  1.00   PTT Latest Ref Range: 24 - 37 seconds 24     Recent Results (from the past 168 hour(s))   INR   Result Value Ref Range    INR 1.80 (H) 0.90 - 1.10       Imaging Results  CTA CHEST PE RUN  8/9/2017 7:18 PM     INDICATION: Chest pain, acute, PE suspected  TECHNIQUE: Helical acquisition through the chest was performed during the arterial phase of contrast enhancement using IV contrast. 2D and 3D reconstructions were performed by the CT technologist. Dose reduction techniques were used.   IV CONTRAST: Iohexol (Omni) 75mL  COMPARISON: 3/24/2016     FINDINGS:  ANGIOGRAM  CHEST: The exam is positive for at least one pulmonary embolism present within subsegmental branch at right lower lobe.     LUNGS AND PLEURA: Mild atelectasis at right lung base. Tiny right pleural effusion.     MEDIASTINUM: Mild cardiomegaly. No lymphadenopathy     LIMITED UPPER ABDOMEN: Negative.     MUSCULOSKELETAL: Degenerative changes, no suspicious lesions.     IMPRESSION:   CONCLUSION:  1.  Exam is positive for pulmonary embolism. There is at least one embolism right lower lobe with associated mild atelectasis and a tiny right pleural effusion.  2.  Findings discussed with Dr. Sawyer at 1940 hours.    US VENOUS LEGS BILATERAL  8/9/2017 10:01 PM     INDICATION: R/O DVT, has PE  TECHNIQUE: Routine exam with compression, augmentation, and duplex utilizing 2D gray-scale imaging, Doppler interrogation with color-flow and spectral waveform analysis.  COMPARISON: None.     FINDINGS: The common femoral, femoral, popliteal, and segmentally visualized calf veins were evaluated.     Right leg veins are negative for deep venous thrombosis.  Left leg veins are negative for deep venous thrombosis.     No popliteal cysts.     IMPRESSION:   CONCLUSION:  1.  Bilateral leg veins are negative for DVT.    [facility]  CT HEAD WO CONTRAST  8/10/2017 3:14 PM     INDICATION: Headache with cough, exertion, or sex.  TECHNIQUE: Routine. Dose reduction techniques were used.  CONTRAST: None.  COMPARISON:  MR 11/15/2011, 12/18/2014.     FINDINGS: No intracranial hemorrhage, extraaxial collection, mass effect or CT evidence of acute infarct.  Mild presumed chronic small vessel ischemic changes. Chronic infarction along the right posterior putamen. Mild generalized volume loss. The   ventricles are proportional to the sulci. Osseous structures are intact. The visualized orbits, paranasal sinuses and mastoid air cells are free of significant disease.      IMPRESSION:   CONCLUSION:  1.  No acute intracranial abnormality.  2.  Mild  generalized parenchymal volume loss. Mild chronic microvascular ischemia.  3.  Chronic right basal ganglia infarction.    LOWER EXTREMITY VENOUS DUPLEX LEFT ULTRASOUND, 11/19/2008.   HISTORY: 76-year-old with left calf pain 2 weeks after left knee surgery. COMPARISON: None.   TECHNIQUE: Gray-scale, color Doppler, and duplex waveform images of the left common femoral vein, superficial femoral vein, popliteal vein, posterior tibial veins, and peroneal veins are submitted for evaluation. 1 of 2 peroneal veins is distended with hypoechoic material and noncompressible consistent with acute deep venous thrombosis. No flow is seen within this vessel. This is in the upper to mid calf. At the ankle, the peroneal veins are fully compressible. The vessels are otherwise patent and fully compressible with normal Doppler waveforms and normal response to augmentation. Incidental note is made of an anechoic collection in the popliteal fossa consistent with a Baker cyst measuring approximately 2 to 3 cm in diameter.   IMPRESSION: Deep venous thrombosis in 1 of 2 peroneal veins in the calf. The findings were discussed with Dr. Banks prior to dictation. He then had an opportunity to speak with the patient prior to discharge from the department. THALIA MADISON MD           Signed by: Linda Ng MD

## 2021-06-13 NOTE — PROGRESS NOTES
Office Visit - Follow Up   Jaclyn Gibbs   85 y.o. female    Date of Visit: 9/18/2017    Chief Complaint   Patient presents with     Follow-up     4 wk f/u     INR Check        Assessment and Plan   1. Pulmonary embolism  Had PE in early August and was confined to the hospital for 2 days where she was anticoagulated initially with Lovenox overlap with warfarin.  Now on warfarin and tolerating it well.  Due for recheck of her INR today.  Needs evaluation for her unprovoked PE.  Check INR.  - Ambulatory referral to Hematology  - INR    2. Spinal stenosis of lumbar region with radiculopathy, L5 to S1  Has chronic back pains.  Causes some weakness of her legs.  Was seen by neurosurgery and spinal care clinic.  Was recommended medical treatment or conservative measures.  Was to get another epidural cortisone injection to her lower back.  On warfarin so she needs to inform IR or the doctor who is going to the procedure for her.    3. Hypercholesteremia  Controlled.  Continue omega-3 and low cholesterol diet.  Last lipids were good showing LDL 47, HDL 56, triglycerides 107, total cholesterol 194.  Does not need medication    4. Essential hypertension  Controlled.  Continue amlodipine, indapamide, losartan.    5. Weakness of both lower extremities  Informed her mild lower extremity weakness is related to her lumbar spinal canal stenosis.  Will continue conservative treatment.  Advised to use cane if necessary.    6. Skin rash  Has skin rash on her feet which apparently does not work.  Wants to use fluocinonide which works in the past.  - fluocinonide (LIDEX) 0.05 % cream; Apply topically 2 (two) times a day.  Dispense: 30 g; Refill:    Follow up in 3 months.     History of Present Illness   This 85 y.o. old female here for follow-up.  Had unprovoked PE in early August.  But had DVT of the left leg in 2008 after she had left total knee arthroplasty.  Doing well now.  Tolerating her warfarin.  Has hypertension  hyperlipidemia which are controlled.  Has a skin rash on her left leg which she wants to use another ointment rather than her triamcinolone.  Reports this does not work.  Has intermittent weakness of her legs and some imbalance due to her lumbar spinal stenosis.  Was seen by neurosurgery and Dr. Aponte did not recommend surgery.  Followed now by the spine care clinic.  Wants to get another epidural cortisone injection to her low back because of these were in the past.  Overall feeling and doing well.    Review of Systems   A 12 point comprehensive review of systems was negative except as noted..     Medications, Allergies and Problem List   Reviewed and updated             Chief Complaint   Follow-up (4 wk f/u) and INR Check       Patient Profile   Social History     Social History Narrative    , 6 children, active, is a full code.  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     Benign Pigmented Nevus     Seborrheic Keratosis     Basal Cell Carcinoma Of The Skin Of The Face     Lacunar Stroke     Obesity     Osteopenia     Essential hypertension with goal blood pressure less than 140/90     Coronary Artery Disease     Paroxysmal Supraventricular Tachycardia     Palpitations     Hypercholesteremia     Lower Back Pain     Spinal stenosis of lumbar region with radiculopathy, L5 to S1     Weakness of both lower extremities     Back pain     GERD (gastroesophageal reflux disease)     Insomnia, unspecified type     Lower extremity weakness     Scoliosis of thoracolumbar spine     Left lumbar radiculopathy     Pulmonary embolism     Other acute pulmonary embolism without acute cor pulmonale     Essential hypertension       Past Surgical History  She has a past surgical history that includes arthroplasty tibial plateau; boggs w/o facetec foramot/dskc / vrt seg, cervical; Appendectomy;  section; ablate heart dysrhythm focus;  Cardiac catheterization; and Coronary angioplasty.       Medications and Allergies   Current Outpatient Prescriptions   Medication Sig     acetaminophen (TYLENOL) 325 MG tablet Take 2 tablets (650 mg total) by mouth every 4 (four) hours as needed.     amLODIPine (NORVASC) 5 MG tablet Take 1 tablet (5 mg total) by mouth daily.     CALCIUM CARB/MAGNESIUM OXID/D3 (CALCIUM MAGNESIUM + D ORAL) Take by mouth 2 (two) times a day. 750/300/500mg     GLUCOSAMINE/CHONDROITIN SULF A (GLUCOSAMINE-CHONDROITIN ORAL) Take 2 tablets by mouth daily.     indapamide (LOZOL) 2.5 MG tablet TAKE 1 TABLET EVERY DAY     losartan (COZAAR) 100 MG tablet Take 1 tablet (100 mg total) by mouth daily.     lutein 20 mg cap Take 1 capsule by mouth daily.     multivitamin (ONE A DAY) per tablet Take 1 tablet by mouth daily.     nitroglycerin (NITROSTAT) 0.4 MG SL tablet Place 1 tablet (0.4 mg total) under the tongue as needed for chest pain (Keep in original bottle).     OMEGA-3/DHA/EPA/FISH OIL (FISH OIL-OMEGA-3 FATTY ACIDS) 300-1,000 mg capsule Take 1 g by mouth daily.     omeprazole (PRILOSEC) 20 MG capsule Take 20 mg by mouth daily as needed.      triamcinolone (KENALOG) 0.1 % cream Apply topically 2 (two) times a day as needed.     warfarin (COUMADIN) 5 MG tablet Take 1 tablet by mouth daily. Adjust dose based on INR results as directed.     fluocinonide (LIDEX) 0.05 % cream Apply topically 2 (two) times a day.     Allergies   Allergen Reactions     Oxycodone Nausea And Vomiting     Codeine Nausea And Vomiting     Diazepam Nausea Only     Morphine Nausea And Vomiting        Family and Social History   Family History   Problem Relation Age of Onset     Stroke Mother 88     Hypertension Father      Sudden death Father 70     suspected heart attack      Breast cancer Sister      Venous thrombosis Sister      At age 80, and had breast CA        Social History   Substance Use Topics     Smoking status: Never Smoker     Smokeless tobacco: Never Used  "    Alcohol use No        Physical Exam       Physical Exam  /58 (Patient Site: Left Arm, Patient Position: Sitting, Cuff Size: Adult Regular)  Pulse 76  Ht 5' 3\" (1.6 m)  Wt 166 lb (75.3 kg)  SpO2 96%  BMI 29.41 kg/m2  General appearance: alert, appears stated age, cooperative and no distress  Head: Normocephalic, without obvious abnormality, atraumatic  Throat: lips, mucosa, and tongue normal; teeth and gums normal  Neck: no adenopathy, no carotid bruit, no JVD, supple, symmetrical, trachea midline and thyroid not enlarged, symmetric, no tenderness/mass/nodules  Lungs: clear to auscultation bilaterally  Heart: regular rate and rhythm, S1, S2 normal, no murmur, click, rub or gallop  Abdomen: soft, non-tender; bowel sounds normal; no masses,  no organomegaly  Extremities: extremities normal, atraumatic, no cyanosis or edema  Skin: Skin color, texture, turgor normal. No rashes or lesions  Neurologic: Grossly normal  Musculoskeletal: Normal exam     Additional Information        Pancho Banks MD  Internal Medicine  Contact me at [phone]     Additional Information   Current Outpatient Prescriptions   Medication Sig     acetaminophen (TYLENOL) 325 MG tablet Take 2 tablets (650 mg total) by mouth every 4 (four) hours as needed.     amLODIPine (NORVASC) 5 MG tablet Take 1 tablet (5 mg total) by mouth daily.     CALCIUM CARB/MAGNESIUM OXID/D3 (CALCIUM MAGNESIUM + D ORAL) Take by mouth 2 (two) times a day. 750/300/500mg     GLUCOSAMINE/CHONDROITIN SULF A (GLUCOSAMINE-CHONDROITIN ORAL) Take 2 tablets by mouth daily.     indapamide (LOZOL) 2.5 MG tablet TAKE 1 TABLET EVERY DAY     losartan (COZAAR) 100 MG tablet Take 1 tablet (100 mg total) by mouth daily.     lutein 20 mg cap Take 1 capsule by mouth daily.     multivitamin (ONE A DAY) per tablet Take 1 tablet by mouth daily.     nitroglycerin (NITROSTAT) 0.4 MG SL tablet Place 1 tablet (0.4 mg total) under the tongue as needed for chest pain (Keep in " original bottle).     OMEGA-3/DHA/EPA/FISH OIL (FISH OIL-OMEGA-3 FATTY ACIDS) 300-1,000 mg capsule Take 1 g by mouth daily.     omeprazole (PRILOSEC) 20 MG capsule Take 20 mg by mouth daily as needed.      triamcinolone (KENALOG) 0.1 % cream Apply topically 2 (two) times a day as needed.     warfarin (COUMADIN) 5 MG tablet Take 1 tablet by mouth daily. Adjust dose based on INR results as directed.     fluocinonide (LIDEX) 0.05 % cream Apply topically 2 (two) times a day.     Allergies   Allergen Reactions     Oxycodone Nausea And Vomiting     Codeine Nausea And Vomiting     Diazepam Nausea Only     Morphine Nausea And Vomiting     Social History   Substance Use Topics     Smoking status: Never Smoker     Smokeless tobacco: Never Used     Alcohol use No         Time: total time spent with the patient was 25 minutes of which >50% was spent in counseling and coordination of care

## 2021-06-13 NOTE — PATIENT INSTRUCTIONS - HE
DISCHARGE INSTRUCTIONS    During office hours (8:00 a.m.- 4:00 p.m.) questions or concerns may be answered  by calling Spine Center Navigation Nurses at  [phone].  Messages received after hours will be returned the following business day.      In the case of an emergency, please dial 911 or seek assistance at the nearest Emergency Room/Urgent Care facility.     All Patients:    ? You may experience an increase in your symptoms for the first 2 days (It may take anywhere between 2 days- 2 weeks for the steroid to have maximum effect).    ? You may use ice on the injection site, as frequently as 20 minutes each hour if needed.    ? You may take your pain medicine.    ? You may continue taking your regular medication after your injection. If you have had a Medial Branch Block you may resume pain medication once your pain diary is completed.    ? You may shower. No swimming, tub bath or hot tub for 48 hours.  You may remove your bandaid/bandage as soon as you are home.    ? You may resume light activities, as tolerated.    ? Resume your usual diet as tolerated.    ? It is strongly advised that you do not drive for 1-3 hours post injection.    ? If you have had oral sedation:  Do not drive for 8 hours post injection.      ? If you have had IV sedation:  Do not drive for 24 hours post injection.  Do not operate hazardous machinery or make important personal/business decisions for 24 hours.      POSSIBLE STEROID SIDE EFFECTS (If steroid/cortisone was used for your procedure)    -If you experience these symptoms, it should only last for a short period      Swelling of the legs                Skin redness (flushing)       Mouth (oral) irritation     Blood sugar (glucose) levels              Sweats                      Mood changes    Headache    Sleeplessness    Weakened immune system for up to 14 days, which could increase the risk of glenn the COVID-19 virus and/or experiencing more severe symptoms of the  disease, if exposed.    Decreased effectiveness of the flu vaccine if given within 2 weeks of the steroid.         POSSIBLE PROCEDURE SIDE EFFECTS  -Call the Spine Center if you are concerned    Increased Pain             Increased numbness/tingling        Nausea/Vomiting            Bruising/bleeding at site        Redness or swelling                                                Difficulty walking        Weakness             Fever greater than 100.5    *In the event of a severe headache after an epidural steroid injection that is relieved by lying down, please call the [facility] to speak with a clinical staff member*

## 2021-06-13 NOTE — TELEPHONE ENCOUNTER
Refill Request  Did you contact pharmacy: No  Medication name:   Requested Prescriptions     Pending Prescriptions Disp Refills     warfarin ANTICOAGULANT (COUMADIN/JANTOVEN) 5 MG tablet 110 tablet 1     Sig: Take 1 tablet (5mg) to 1 &1/2 tablets (7.5mg) by mouth daily, as directed.  Adjust dose based on INR results.     Who prescribed the medication: Pancho Banks MD    Requested Pharmacy: [facility]  Is patient out of medication: Yes Has been out for four days  Patient notified refills processed in 3 business days:  yes  Okay to leave a detailed message: yes

## 2021-06-14 NOTE — PROGRESS NOTES
ANTICOAGULATION  MANAGEMENT PROGRAM    Jaclyn Gibbs is overdue for INR check.     Reminder letter sent      Ade Eavns RN

## 2021-06-14 NOTE — PROGRESS NOTES
"Jaclyn Gibbs is a 88 y.o. female who is being evaluated via a billable phone visit as the patient is 88 years old and says she does not have the technology to complete a video visit..      The patient has been notified of following:     \"This telephone visit will be conducted via a call between you and your physician/provider. We have found that certain health care needs can be provided without the need for an in-person physical exam.  This service lets us provide the care you need with a phone  conversation.  If a prescription is necessary we can send it directly to your pharmacy.  If lab work is needed we can place an order for that and you can then stop by our lab to have the test done at a later time.    Telephone  visits are billed at different rates depending on your insurance coverage. Please reach out to your insurance provider with any questions.    If during the course of the call the physician/provider feels a  Phone  visit is not appropriate, you will not be charged for this service.\"    Patient has given verbal consent to a telephone visit? Yes    Patient would like to receive their AVS by mail      Will anyone else be joining your phone visit? No        Call  Start Time: 2:31 pm    Assessment:   Jaclyn Gibbs is a 88 y.o. y.o. female with past medical history significant for pulmonary embolism (on Coumadin), coronary artery disease, hyperlipidemia, hypertension, osteopenia who presents today for follow-up regarding chronic left leg pain with concern for left L5 radiculopathy, secondary to great toe extensor weakness.  This is thought to be secondary to lumbar foraminal stenosis at the L5-S1 level.  Patient is status post a left L5-S1 transforaminal epidural steroid injection performed on December 11 of 2020.  At this time, she reports that she still had off-and-on pain up until yesterday, when pain seems like it has resolved and she no longer has any pain in the left ankle.  She is " pleased with how she is doing.    The patient has [employer] insurance is does not plan to change insurance at this time.  Dr. Torres did explain that that as of January 1 of 2021, this clinic will not be in network for her.  At this time, patient has not called [employer] to find out who is in network for her.    PSP:  Dr. Torres       Plan:     A shared decision making plan was used.  The patient's values and choices were respected.  The following represents what was discussed and decided upon by the physician and the patient.      1.  DIAGNOSTIC TESTS: The patient's MRI of the lumbar spine from May 31, 2017 was personally reviewed today by the physician with physician performing her own interpretation.  She does have a significant scoliosis of the lumbar spine.  She has significant right L2-3 foraminal stenosis.  There is moderate central canal stenosis with right greater than left L3-4 foraminal stenosis.  At the L4-5 level, there is severe facet arthropathy.  There is significant left lateral recess stenosis.  There is left greater than right foraminal stenosis at the L4-5 level.  At the L5-S1 level, there is facet arthropathy.  There is severe left L5-S1 foraminal stenosis.  -The patient may benefit from an updated MRI when she establishes care at her new spine center (when it is in network for her insurance).  2.  PHYSICAL THERAPY: The patient has done physical therapy in the past for her low back.  She states that she occasionally her home exercise program.  She is encouraged to continue doing the home exercise program on a regular basis over the long-term.  3.  MEDICATIONS:    -She can continue to take over-the-counter Tylenol.  -She has tried gabapentin in the past and stated that she had severe side effects, even to the 100 mg capsule.  4.  INTERVENTIONS: Patient can continue to have the repeat left L5-S1 transforaminal epidural steroid injections once every 3 months.  5.  PATIENT EDUCATION:    -Dr. Torres  explained that at the last 2 visits, she was told to call [employer] to find out who is in network.  Once she knows which providers are in network for her, then Dr. Torres can make recommendation for 1 of those providers if Dr. Torres has experience in working with that provider.  Patient states that she thought Dr. Torres was going to give her names.  Dr. Torres explained that this has been discussed previously, that Dr. Torres does not know who is in network for her, and does not want to give her names until she knows who is actually in network for her.  Patient states that she will call [employer].  She then stated she does not know how to call you [employer], so Dr. Torres recommended that she call the phone number on her insurance card.  The insurance company should be able to give her a list of names of spine providers who will be in network for her.  She is asked to write these things down and then call Dr. Torres's office back and then the recommendation can be given.  -Dr. Torres again emphasized that she really needs to call her insurance company (as soon as possible) to find out who will be in network for her going forward.  She emphasized that this clinic will not be in network with [employer] will not be able to provide any further care after January 1 of 2021.  Jaclyn said she understands this.  -The patient continues to decline an offer for a surgery consultation.  She is not interested in surgery at her age, especially given that she continues to have relief with the epidural steroid injection.  -All of her questions were answered to her satisfaction today.    6.  FOLLOW-UP: The patient is welcome to call here with any questions or concerns until she can become established in a spine center that is in network with [employer].  Otherwise she should transition her care to this new center that is in network.     Subjective:     Jaclyn Gibbs is a 88 y.o. female who presents today for follow-up regarding  chronic left-sided low back pain with left radicular pain, concentrating in the left ankle.  She is status post a left L5-S1 transforaminal epidural steroid injection performed on December 11 of 2020.  She states that she did not notice relief until yesterday.  Reports that she had significant pain off and on up until yesterday.  Reports that since yesterday, she has had no pain in the left ankle.  She is pleased with how she is doing now, she has always had relief with this injection in the past.  She rates her pain today at a 0 out of 10.  Prior to the relief that she noticed yesterday, could be as high as a 10 out of 10.  She is not really sure what makes her pain worse at this time.  Again she has not experienced any pain since yesterday.  She does feel better with the injection.  She denies any new symptoms at this time.  She has not been diligent in doing her home exercise program.  She is just taking Tylenol as needed.  She states that she has not yet found a new spine provider.  She states that she thought Dr. Torres was going to call her with names of providers.    Past medical history is reviewed and is unchanged for any new medical diagnoses in the interim.      Family history is reviewed and is unchanged in the interim.      Review of Systems:   Pertinent negatives include no numbness, tingling, weakness, fevers, chills, unexplained weight loss, bowel incontinence, bladder incontinence, trips, stumbles, falls.  All others reviewed and are negative.     Objective:   CONSTITUTIONAL:  Vital signs as above.  No acute distress.  The patient is well nourished and well groomed.    PSYCHIATRIC:  The patient is awake, alert, oriented to person, place and time.  The patient is answering questions appropriately with clear speech.  Normal affect.       Phone-Visit Details    Type of service:  Phone Visit    Call End Time (time video stopped): 2:38 pm (total time 7 minutes)  Originating Location (pt. Location):  Home    Distant Location (provider location):  [facility]       Courtney Kelly, DO

## 2021-06-14 NOTE — TELEPHONE ENCOUNTER
----- Message from Rosa Marroquin V sent at 12/31/2020  9:54 AM CST -----  General phone call: Dr. Dave saw Jaclyn today at [facility].  She's experiencing dizziness, confusion, spinning, off balance.  She had an EKG today- ekg read frequent multiform ectopic ventricular beats.   Dr. Dave would like a call back today to discuss patients status. Pt has been schedule to see Dr. Cardona on 1/14 at .      Caller: Nuria Mitchell calling for Dr. Dave  Primary cardiologist: Brendan  Detailed reason for call:   New or active symptoms? yes  Best phone number: [phone]  Best time to contact: Anytime  Ok to leave a detailed message? Yes  Device? No    Additional Info:        Called back Dr. Dave's office to address concerns. Ligia has apparently not been following up much lately and appears confused and dizzy at appt with PMD. She also has noted to have EKG changes- frequent PVC's. Dr. Dave did not feel she needed the ER but would like to move up visit with [facility] from 1/14 to something sooner. Writer spoke to care coordinator- Vicky. They are agreeable to R[facility] and she was offered [facility] appt as early as next week. Transferred to scheduling to have this arranged. -[facility]

## 2021-06-14 NOTE — PATIENT INSTRUCTIONS - HE
Jaclyn,    YOU need to call [employer] to find out who is in network for you.  Dr. Torres CANNOT give you names of provider who are in network for you (she does not know who is in network for [employer]).  Please call [employer] (their phone number is listed on your insurance card) and ask them for the name of a SPINE PROVIDER or CLINIC that you can go to that is in network.  Please write these names down and then call Dr. Torres's office at [phone] so she can give you recommendations (if she knows any of these providers/clinics).    A nurse will call you on Thursday to see  If you have called [employer] to find out who is in network. Please do not hesitate to contact the clinic at [phone] if you have any questions/concerns or any worsening of your pain prior to that time.  You are also welcome to contact Dr. Torres via [employer].

## 2021-06-14 NOTE — PROGRESS NOTES
Office Visit - Follow Up   Jaclyn Gibbs   85 y.o. female    Date of Visit: 12/19/2017    Chief Complaint   Patient presents with     Follow-up     fasting, needs INR         Assessment and Plan   1. Essential hypertension  Controlled.  Continue amlodipine, losartan and indapamide.  Check CBC basic metabolic panel.  - HM2(CBC w/o Differential)  - Basic Metabolic Panel    2. Hypercholesteremia  Controlled.  Continue low-fat diet.  Does not need medication.  Lipids on 8/5/2017  were good , HDL 56, triglyceride 107 and total cholesterol 194.  Check lipids, TSH and liver function.  - Lipid Cascade  - Thyroid Stimulating Hormone (TSH)  - Hepatic Profile    3. 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  Now on warfarin.  Wants to stop warfarin.  So she was  referred to hematology.  Saw Dr. Linda Ng on 10/2/2017.  Was advised since this was most probably unprovoked PE she needs lifelong anticoagulation at best and 6 months at least.  But he preferred lifelong anticoagulation.  Complains of diarrhea and not feeling well with warfarin.  But doubt  these are due to warfarin.  After long discussion and impressed on her she has a high risk of recurrence of pulmonary embolism without anticoagulation she agreed to continue with her warfarin.    4. Overactive bladder  Has recurrence of her overactive bladder.  Used to take oxybutynin.  Stopped this 2 years ago.  Now she is having increased or frequent urination especially at night.  Reports she voids every 2 hours which disturbs her sleep.  Will try oxybutynin again and see if this work.  Check urinalysis.  - oxybutynin (DITROPAN) 5 MG tablet; Take 1 tablet (5 mg total) by mouth at bedtime.  Dispense: 90 tablet; Refill: 3  - Urinalysis- if Indicated    5. Lacunar Stroke  Had lacunar stroke in the past without neurologic residual deficit.  Doing well.    Follow up in 4 months.     History of Present Illness   This 85 y.o. old female is here for  follow-up.  Has concerns with her warfarin.  Takes warfarin for her presumed unprovoked pulmonary embolism in August. Reports of diarrhea and not feeling well with warfarin which I do not think specifically are caused by warfarin.  Was referred to hematology because she wanted to know whether she can stop her warfarin.  Because this was unprovoked pulmonary embolism she was recommended to have lifelong anticoagulation with warfarin preferably.  Also an option which hematology did not considered strongly is up to 6 months of warfarin.  She is 3-1/2 months out from her pulmonary embolism so it does not even save to consider stopping warfarin because of high risk of another pulmonary embolism.  Complains of frequency and nocturia.  Used to be on oxybutynin in the past.  Stopped this on her own 2 years ago.  Wants to take it again.  Has hypertension controlled by amlodipine, losartan and indapamide.  Has hypercholesterolemia controlled without need for medication.  Last lipids were good.  Has lacunar stroke in the past without neurologic deficit.  Reports occasionally she feels her heart beating strongly at night but not fast.  Overall feels well.    Review of Systems   A 12 point comprehensive review of systems was negative except as noted..     Medications, Allergies and Problem List   Reviewed and updated             Chief Complaint   Follow-up (fasting, needs INR )       Patient Profile   Social History     Social History Narrative    , 6 children, active, is a full code.  (last updated 8/9/2017)         Past Medical History   Patient Active Problem List   Diagnosis     DVT left leg 2008 after TKA     Benign Pigmented Nevus     Seborrheic Keratosis     Basal Cell Carcinoma Of The Skin Of The Face     Lacunar Stroke     Obesity     Osteopenia     Essential hypertension with goal blood pressure less than 140/90     Coronary Artery Disease     Paroxysmal Supraventricular Tachycardia     Palpitations      Hypercholesteremia     Lower Back Pain     Spinal stenosis of lumbar region with radiculopathy, L5 to S1     Weakness of both lower extremities     Back pain     GERD (gastroesophageal reflux disease)     Insomnia, unspecified type     Lower extremity weakness     Scoliosis of thoracolumbar spine     Left lumbar radiculopathy     Pulmonary embolism     Other acute pulmonary embolism without acute cor pulmonale     Essential hypertension       Past Surgical History  She has a past surgical history that includes Appendectomy;  section (1970); pr ablate heart dysrhythm focus (2014); Cardiac catheterization; Coronary angioplasty (2010); Cataract extraction (Bilateral); Excision basal cell carcinoma; Lumbar laminectomy (); Total knee arthroplasty (Right, 2003); and Total knee arthroplasty (Left, 2008).       Medications and Allergies   Current Outpatient Prescriptions   Medication Sig     acetaminophen (TYLENOL) 325 MG tablet Take 2 tablets (650 mg total) by mouth every 4 (four) hours as needed.     amLODIPine (NORVASC) 5 MG tablet Take 1 tablet (5 mg total) by mouth daily.     CALCIUM CARB/MAGNESIUM OXID/D3 (CALCIUM MAGNESIUM + D ORAL) Take by mouth 2 (two) times a day. 750/300/500mg     fluocinonide (LIDEX) 0.05 % cream Apply topically 2 (two) times a day.     GLUCOSAMINE/CHONDROITIN SULF A (GLUCOSAMINE-CHONDROITIN ORAL) Take 2 tablets by mouth daily.     indapamide (LOZOL) 2.5 MG tablet TAKE 1 TABLET EVERY DAY     losartan (COZAAR) 100 MG tablet Take 1 tablet (100 mg total) by mouth daily.     lutein 20 mg cap Take 1 capsule by mouth daily.     multivitamin (ONE A DAY) per tablet Take 1 tablet by mouth daily.     nitroglycerin (NITROSTAT) 0.4 MG SL tablet Place 1 tablet (0.4 mg total) under the tongue as needed for chest pain (Keep in original bottle).     OMEGA-3/DHA/EPA/FISH OIL (FISH OIL-OMEGA-3 FATTY ACIDS) 300-1,000 mg capsule Take 1 g by mouth daily.     omeprazole (PRILOSEC)  "20 MG capsule Take 20 mg by mouth daily as needed.      triamcinolone (KENALOG) 0.1 % cream Apply topically 2 (two) times a day as needed.     warfarin (COUMADIN) 5 MG tablet Take 1 or 1&1/2 tablets (5mg or 7.5mg) by mouth daily, as directed.   Adjust dose based on INR results.     oxybutynin (DITROPAN) 5 MG tablet Take 1 tablet (5 mg total) by mouth at bedtime.     Allergies   Allergen Reactions     Oxycodone Nausea And Vomiting     Codeine Nausea And Vomiting     Diazepam Nausea Only     Morphine Nausea And Vomiting        Family and Social History   Family History   Problem Relation Age of Onset     Stroke Mother 88     Hypertension Father      Sudden death Father 70     suspected heart attack      Breast cancer Sister      Venous thrombosis Sister 80     At age 80, and had breast CA     Cirrhosis Sister 34     Aneurysm Sister      Other Brother 25     Killed in WWII.     Brain cancer Daughter 5     No Medical Problems Daughter      No Medical Problems Son      No Medical Problems Son      No Medical Problems Son      Dementia Sister         Social History   Substance Use Topics     Smoking status: Never Smoker     Smokeless tobacco: Never Used     Alcohol use No        Physical Exam       Physical Exam  /80  Pulse 75  Ht 5' 3\" (1.6 m)  Wt 165 lb (74.8 kg)  SpO2 98%  BMI 29.23 kg/m2  General appearance: alert, appears stated age, cooperative and no distress  Head: Normocephalic, without obvious abnormality, atraumatic  Throat: lips, mucosa, and tongue normal; teeth and gums normal  Neck: no adenopathy, no carotid bruit, no JVD, supple, symmetrical, trachea midline and thyroid not enlarged, symmetric, no tenderness/mass/nodules  Lungs: clear to auscultation bilaterally  Heart: regular rate and rhythm, S1, S2 normal, no murmur, click, rub or gallop  Abdomen: soft, non-tender; bowel sounds normal; no masses,  no organomegaly  Extremities: extremities normal, atraumatic, no cyanosis or edema  Skin: Skin " color, texture, turgor normal. No rashes or lesions  Neurologic: Grossly normal     Additional Information        Pancho Banks MD  Internal Medicine  Contact me at [phone]     Additional Information   Current Outpatient Prescriptions   Medication Sig     acetaminophen (TYLENOL) 325 MG tablet Take 2 tablets (650 mg total) by mouth every 4 (four) hours as needed.     amLODIPine (NORVASC) 5 MG tablet Take 1 tablet (5 mg total) by mouth daily.     CALCIUM CARB/MAGNESIUM OXID/D3 (CALCIUM MAGNESIUM + D ORAL) Take by mouth 2 (two) times a day. 750/300/500mg     fluocinonide (LIDEX) 0.05 % cream Apply topically 2 (two) times a day.     GLUCOSAMINE/CHONDROITIN SULF A (GLUCOSAMINE-CHONDROITIN ORAL) Take 2 tablets by mouth daily.     indapamide (LOZOL) 2.5 MG tablet TAKE 1 TABLET EVERY DAY     losartan (COZAAR) 100 MG tablet Take 1 tablet (100 mg total) by mouth daily.     lutein 20 mg cap Take 1 capsule by mouth daily.     multivitamin (ONE A DAY) per tablet Take 1 tablet by mouth daily.     nitroglycerin (NITROSTAT) 0.4 MG SL tablet Place 1 tablet (0.4 mg total) under the tongue as needed for chest pain (Keep in original bottle).     OMEGA-3/DHA/EPA/FISH OIL (FISH OIL-OMEGA-3 FATTY ACIDS) 300-1,000 mg capsule Take 1 g by mouth daily.     omeprazole (PRILOSEC) 20 MG capsule Take 20 mg by mouth daily as needed.      triamcinolone (KENALOG) 0.1 % cream Apply topically 2 (two) times a day as needed.     warfarin (COUMADIN) 5 MG tablet Take 1 or 1&1/2 tablets (5mg or 7.5mg) by mouth daily, as directed.   Adjust dose based on INR results.     oxybutynin (DITROPAN) 5 MG tablet Take 1 tablet (5 mg total) by mouth at bedtime.     Allergies   Allergen Reactions     Oxycodone Nausea And Vomiting     Codeine Nausea And Vomiting     Diazepam Nausea Only     Morphine Nausea And Vomiting     Social History   Substance Use Topics     Smoking status: Never Smoker     Smokeless tobacco: Never Used     Alcohol use No         Time:  total time spent with the patient was 40 minutes of which >50% was spent in counseling and coordination of care

## 2021-06-14 NOTE — TELEPHONE ENCOUNTER
Pt returned call. Pt states she has not yet connected with her insurance. She does have her insurance card but not with her during the phone call. Instructed pt to call the # on the back of the card so they can give her names of clinics that are in network for her. Explained that she can then call us and Dr. Torres could give recommendation on spine providers/clinics. Advised she do this as soon as possible as she gets injections every 3 months and we do not want her to need another injection and not have established care somewhere else. She stated understanding of this. She states she will do this.     Offered to call pt back to check in which she declined.

## 2021-06-14 NOTE — TELEPHONE ENCOUNTER
Follow-up call placed to pt to check and see if she was able to get in contact with her insurance company as [facility] of network [facility] beginning January 1st, 2021. Left message to return call.

## 2021-06-14 NOTE — TELEPHONE ENCOUNTER
Anticoagulation clinic notificiation    Dr. Banks,    Your patient, Jaclyn Gibbs,  is past due for an INR check  The patient s last INR was 2.50 on 09/25/2020 and was due to come back on 10/5/2020.    Jaclyn received two phone calls and one letter over the last several weeks in attempt to arrange a follow up appointment.  Jaclyn will be sent another letter today.     No action is required from you unless you have additional information or if you would like to reach out personally to Jaclyn.    Please don t hesitate to contact the Anticoagulation Management Program at [phone] if you have any questions or concerns.     Sincerely,     [facility]

## 2021-06-15 NOTE — TELEPHONE ENCOUNTER
RN cannot approve Refill Request    RN can NOT refill this medication No established PCP. Last office visit: 1/27/2020 Pancho Banks MD Last Physical: Visit date not found Last MTM visit: Visit date not found Last visit same specialty: 1/27/2020 Pancho Banks MD.  Next visit within 3 mo: Visit date not found  Next physical within 3 mo: Visit date not found      Pam Sage, [facility] Triage/Med Refill 3/6/2021    Requested Prescriptions   Pending Prescriptions Disp Refills     amLODIPine (NORVASC) 5 MG tablet [Pharmacy Med Name: AMLODIPINE BESYLATE 5 MG Tablet] 90 tablet 2     Sig: TAKE 1 TABLET EVERY DAY       Calcium-Channel Blockers Protocol Failed - 3/5/2021  3:59 PM        Failed - PCP or prescribing provider visit in past 12 months or next 3 months     Last office visit with prescriber/PCP: 1/27/2020 Pancho Banks MD OR same dept: Visit date not found OR same specialty: 1/27/2020 Pancho Banks MD  Last physical: Visit date not found Last MTM visit: Visit date not found   Next visit within 3 mo: Visit date not found  Next physical within 3 mo: Visit date not found  Prescriber OR PCP: Pancho Banks MD  Last diagnosis associated with med order: 1. Essential hypertension with goal blood pressure less than 140/90  - amLODIPine (NORVASC) 5 MG tablet [Pharmacy Med Name: AMLODIPINE BESYLATE 5 MG Tablet]; TAKE 1 TABLET EVERY DAY  Dispense: 90 tablet; Refill: 2  - indapamide (LOZOL) 2.5 MG tablet [Pharmacy Med Name: INDAPAMIDE 2.5 MG Tablet]; TAKE 1 TABLET EVERY DAY  Dispense: 90 tablet; Refill: 3    If protocol passes may refill for 12 months if within 3 months of last provider visit (or a total of 15 months).             Passed - Blood pressure filed in past 12 months     BP Readings from Last 1 Encounters:   03/04/21 117/62                indapamide (LOZOL) 2.5 MG tablet [Pharmacy Med Name: INDAPAMIDE 2.5 MG Tablet] 90 tablet 3     Sig: TAKE 1 TABLET EVERY DAY        Diuretics/Combination Diuretics Refill Protocol  Failed - 3/5/2021  3:59 PM        Failed - Visit with PCP or prescribing provider visit in past 12 months     Last office visit with prescriber/PCP: 1/27/2020 Pancho Banks MD OR same dept: Visit date not found OR same specialty: 1/27/2020 Pancho Banks MD  Last physical: Visit date not found Last MTM visit: Visit date not found   Next visit within 3 mo: Visit date not found  Next physical within 3 mo: Visit date not found  Prescriber OR PCP: Pancho Banks MD  Last diagnosis associated with med order: 1. Essential hypertension with goal blood pressure less than 140/90  - amLODIPine (NORVASC) 5 MG tablet [Pharmacy Med Name: AMLODIPINE BESYLATE 5 MG Tablet]; TAKE 1 TABLET EVERY DAY  Dispense: 90 tablet; Refill: 2  - indapamide (LOZOL) 2.5 MG tablet [Pharmacy Med Name: INDAPAMIDE 2.5 MG Tablet]; TAKE 1 TABLET EVERY DAY  Dispense: 90 tablet; Refill: 3    If protocol passes may refill for 12 months if within 3 months of last provider visit (or a total of 15 months).             Passed - Serum Potassium in past 12 months      Lab Results   Component Value Date    Potassium 3.6 03/02/2021             Passed - Serum Sodium in past 12 months      Lab Results   Component Value Date    Sodium 140 03/02/2021             Passed - Blood pressure on file in past 12 months     BP Readings from Last 1 Encounters:   03/04/21 117/62             Passed - Serum Creatinine in past 12 months      Creatinine   Date Value Ref Range Status   03/02/2021 0.83 0.60 - 1.10 mg/dL Final

## 2021-06-15 NOTE — CONSULTS
[facility] Hematology and Oncology Consult Note    Patient: Jaclyn Gibbs  MRN: [id number]  Date of Service: 02/24/2021        Reason for Visit    I was consulted by Dr. Nolen regarding colon cancer.    Assessment/Plan    Ms. Jaclyn Gibbs is a 88 y.o. woman with newly diagnosed colon cancer.  She has a complex past medical history as outlined below.  I have seen her in 2017 after she had the episode of pulmonary embolism.  During that she had a previous leg DVT and because it was doubtful that the pulmonary embolism was provoked, I had recommended at least 6 months of anticoagulation, if possible indefinite anticoagulation.  She has continued on warfarin anticoagulation since then.  She was admitted to the hospital on 2/15/2021 with an INR of more than 7 and anemia.  With her complaints of some loose stools and blood in the stools, she had a CT scan of the abdomen and pelvis done on 2/14/2021 which showed some suspicious lesions in the hepatic flexure and in the transverse colon.  Colonoscopy from 2/17/2021 showed a nearly circumferential moderately differentiated adenocarcinoma in the hepatic flexure and a 3 cm moderately differentiated adenocarcinoma in the distal transverse colon.  She also had a 18 mm polyp in the distal ascending colon which appeared to be a tubulovillous adenoma with high-grade dysplasia.  She also had a sessile polyp in the ascending colon proximally which was serrated adenoma.  She could be restarted on warfarin at discharge.  She is on oral iron for iron supplementation since that she was found to have iron deficiency anemia, which I think is due to blood loss from these colonic carcinoma lesions.  Going through her old medical records, she did complain of bleeding per rectum again in 2017 and Dr. Banks referred her to colorectal surgery.  The patient does not remember that now and it does not appear that she actually met with colorectal surgery.    She has a  significant comorbidities with coronary artery disease, paroxysmal supraventricular tachycardia, scoliosis and lumbar radiculopathy and a stroke back in December 2009.  All these are interfering with her overall activity level.  She is mostly confined to home nowadays.  She can manage her ADLs herself.  She has a lot of help from the rest of the family to take care of everything else.  Recently she has been having some memory problems also.    I have gone through her past medical records in detail.  I have reviewed her labs.  I have reviewed the pathology report.  I have reviewed the images of the CT scan from 2/14/2021, the CT chest from 2/17/2021 and the images and report of the PET CT scan from 2/22/2021.  I have also reviewed her outside medical records in the [employer] system.    1.  I had a detailed discussion with the patient and her family.  I showed her the PET CT scan and showed her the 2 PET positive colonic lesions.  We are not seeing any evidence of metastatic disease.  The main concern was about a 3 mm right middle lobe lung nodule.  That does not light up at all in the PET CT scan.  Thus the colon cancer appears to be confined to the colon.  I discussed with her that we will be able to make out only after surgery the exact stage of the colon cancer.  However from the PET CT scan we can see that it is not a stage IV.    2.  I discussed with her that if she has a reasonable chance of getting through surgery safely, I would strongly recommend going for surgery.  That is the only modality by which she can reach a cure from the colon cancer.  There is no role for neoadjuvant chemotherapy in her situation.  Many patients are completely cured with surgery alone.  I discussed with her that we cannot predict how the surgery may go.  She has significant comorbidities and she is 88 years of age.  However I have seen several patients who are in much worse shape than her getting through colon cancer surgery safely.   However in her particular case, with the 2 separate colon cancer lesions simultaneously which had to be taken care of, surgery is likely to be more complicated than usual.  However I am hopeful that a colonic resection for cure to take care of both lesions may be feasible.  I am not sure whether she will be able to have an enteroenteric anastomosis done or whether she will need a colostomy.  I asked her and her family to have more of a discussion with Dr. Britt.  They said that they are in contact with his office and plan to get the surgery appointments made soon.    3.  I discussed with her that she needs to be seen by a medical oncologist after completion of surgery to review the final pathology.  If she has stage III disease with involvement of the lymph nodes, then a discussion need to be had about adjuvant chemotherapy.  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  If we are considering adjuvant treatment, it may be worth doing further testing for microsatellite instability, BRAF mutation etc.  I do not think these tests are needed if she does not need adjuvant chemotherapy.  I told her that if she is willing, I would like to meet with her again a couple weeks after surgery to go through the pathology report and help her make a decision about adjuvant treatment or follow-up.  On the other hand if they have some insurance issues to coming to the [facility], they should meet with another medical oncologist as convenient for them.    4.  I discussed with her that she has restarted on warfarin for anticoagulation for the pulmonary embolism.  Symptomatically she appears to be tolerating it well.  She at least does not report latasha blood in the stool.  Overall I am in favor of her continuing with anticoagulation but if she has more problems with bleeding before going for surgery, I think it is okay for  her to hold anticoagulation temporarily.  It can be restarted after surgery.  She wanted to have an INR checked today which we will do and forwarded to her primary physician.  Addendum: INR today is 1.6.    5.  Iron deficiency anemia.  Clearly the iron deficiency anemia was due to blood loss from the colonic cancer lesions.  The iron panel and ferritin from when she was in the hospital is quite consistent.  Currently she is taking iron tablets 2 times a day and she appears to be tolerating it well.  I asked her to continue with that.  We will check an iron panel and ferritin today.  If she has a significant anemia in spite of oral iron supplementation, we can consider IV iron supplementation before going for surgery.  Addendum: Hemoglobin has improved to 10.7.  I do not think she needs IV iron.  Continue with oral iron supplementation.  I called and informed her daughter.    6.  We will obtain the following labs today: CBC differential platelets, ferritin, TIBC panel and an INR.    7.  The patient has brought in her healthcare directive.  She has designated her  as the primary healthcare agent and her daughter Aydee as the alternative.  My understanding is that she wants to be full code.  We have sent the copy of the healthcare directive to be scanned into the medical records.    8.  The patient and family plan to follow-up with Dr. Britt's office to set up the date of surgery and with Dr. Sharma of the preop examination.    9.  Return to clinic with me in about a month's time.  By then I hope the surgery will be done and we can discuss about adjuvant treatment.      ECOG Performance   ECOG Performance Status: 2  Distress Assessment  Distress Assessment Score: 2        Problem List    1. Overlapping malignant neoplasm of colon (H)  HM1(CBC and Differential)    INR    Ferritin    Iron and Transferrin Iron Binding Capacity   2. Chronic pulmonary embolism without acute cor pulmonale, unspecified pulmonary  embolism type (H)  INR   3. Iron deficiency anemia due to chronic blood loss  Ferritin    Iron and Transferrin Iron Binding Capacity   4. Long term current use of anticoagulant therapy  INR           CC: Dl Carey PA-C Sarah Masrud, MD      ______________________________________________________________________________      History    Ms. Jaclyn Gibbs is an 88-year-old woman who is here for the consultation with her son.  Her  and daughter also took part in the encounter by being on the speaker phone.    She was admitted to [facility] on 2/15/2021, after she was found to be anemic and with a high INR.  At the time of admission her INR was more than 7.  Hemoglobin at admission was 7.4 which came down overnight to 6.9 necessitating a blood transfusion.  She had had a CT scan of the abdomen done as an outpatient on 2/14/2021 when she complained of some loose stools with blood in it.  This showed a chronic lesion in the distal ascending colon and also the proximal transverse colon which looked like contractions but there was a suspicion for malignancy.  She had a colonoscopy done in the hospital on 2/17/2021 which showed two 15 to 20 mm sessile polyps in the ascending colon which on biopsy was a sessile serrated adenoma and a mucosal excrescence.  Was an 18 mm sessile polyp in the distal ascending colon with a central depression.  Pathology showed a tubulovillous adenoma with high-grade dysplasia.  There is a 6 cm nearly circumferential mass in the hepatic flexure.  Biopsy showed a moderately differentiated adenocarcinoma and there was another 3 cm mass in the transverse colon which on biopsy again showed a greatly differentiated adenocarcinoma.  There were also a few sigmoid colon diverticula, internal hemorrhoids, external skin tags and some edematous mucosa in the sigmoid colon which could be due to trauma from the colonoscopy.    She was seen by Dr. Britt of surgery.  A CT scan of the  chest showed a 3 mm right middle lobe nodule which raised the prospect of metastatic disease.  It was decided to do a PET CT scan to complete staging and then to consider the possibility of surgery.    She is here with her son to discuss about the treatment for colon cancer.  She is back on warfarin for the pulmonary embolism and DVT that she has had in the past.  She states that she has not noticed any blood in the stool since discharge from the hospital.  She does feel somewhat constipated which is a longstanding issue for her and for which she uses laxatives as needed.  She does have black-colored stools but she thinks that is from the iron tablets that she is taking.  She is taking iron tablets 1 each morning and evening.  She does not have a stomach upset with that.    She says that her main problem is shortness of breath on exertion.  She can walk about a block on level ground.  She states that she can barely manage 1 flight of stairs but she will be very short of breath.    This is that she can manage all her activities of daily living like bathing and changing.  She can do a little bit of cooking but cannot do a lot because she cannot stand for a long time because of the back pain that she has chronically.  She no longer goes shopping.  She no longer drives.  She has a lot of support from her  and the rest of her family.    She herself and the family feel that she has some memory issues going on.    No fevers or night sweats.  No lumps or bumps anywhere.  No unusual headaches.  Vision is stable.  No mouth sores.  No swallowing difficulty.  She states that she eats well.  She is not losing weight.  No chest pain or palpitation.  No cough.  No abdominal pain.  No difficulty with urination.  No skin rashes.  She has not noticed any recent red blood in the stool.    Please see below.  A 14 point review of system is otherwise completely negative.    Past History  Past Medical History:   Diagnosis Date      Acute pancreatitis 2013     Anemia 2019     Atrial fibrillation (H)      Basal cell carcinoma      Basal Cell Carcinoma Of The Skin Of The Face     Created by Conversion  Replacement Utility updated for latest IMO load     Carpal tunnel syndrome      Coronary artery disease      CVA (cerebral vascular accident) (H) 2009    Posterior limb right internal capsule ischemic stroke.     GERD (gastroesophageal reflux disease)      History of pulmonary embolism 2019     Hyperlipidemia      Hypertension      Left leg DVT (H) 2008    S/P L knee replacement 08     Lumbar radiculopathy     L5-S1 level neural foraminal stenosis causing leg weakness.     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     Peroneal DVT (deep venous thrombosis), left (H) 2008    after left knee replacement. Was on warfarin for some time.     Personal history of DVT (deep vein thrombosis) 2019     Pulmonary embolism on right (H) 2017    3 days after a 1 hour car drive to [place].     Right-sided lacunar infarction (H) 2009    Acute ataxia. MRI: Small posterior limb right internal capsule infarct.     Scoliosis      Scoliosis of thoracolumbar spine 2017     Spinal stenosis of lumbar region with radiculopathy, L5 to S1 2016     Vertigo 2014    Vestibular versus ischemic changes.     Past Surgical History:   Procedure Laterality Date     APPENDECTOMY       BASAL CELL CARCINOMA EXCISION      upper back and left chin     CARPAL TUNNEL RELEASE Right 2007     CATARACT EXTRACTION Bilateral       SECTION  1970     CORONARY ANGIOPLASTY WITH STENT PLACEMENT  2010    2.5 x12 mm Taxus Liberte stent to the first diagonal.     CV CORONARY ANGIOGRAM N/A 2018    Procedure: Coronary Angiogram;  Surgeon: Christopher Mendes MD;  Location: [facility] Cath Lab;  Service:       CV LEFT HEART CATHETERIZATION WO LEFT VETRICULOGRAM Left 08/23/2018    Procedure: Left Heart Catheterization Without Left Ventriculogram;  Surgeon: Christopher Mendes MD;  Location: [facility] Cath Lab;  Service:      LUMBAR LAMINECTOMY  02/11/2011    L4-L5.     WA ABLATE HEART DYSRHYTHM FOCUS  05/07/2014    Description: Catheter Ablation Atrial Supraventricular Tachycardia;  Comments: typical AVNRT,  successful cryoablation     WA COLONOSCOPY FLX DX W/COLLJ SPEC WHEN PFRMD N/A 02/17/2021    Procedure: COLONOSCOPY WITH BIOPSIES;  Surgeon: Rajesh Pagan DO;  Location: [facility] OR;  Service: Gastroenterology     TOTAL KNEE ARTHROPLASTY Right 01/23/2003     TOTAL KNEE ARTHROPLASTY Left 11/05/2008     Family History   Problem Relation Age of Onset     Stroke Mother 88     Hypertension Father      Sudden death Father 70        suspected heart attack      Breast cancer Sister      Venous thrombosis Sister 80        At age 80, and had breast CA     Cirrhosis Sister 34     Aneurysm Sister      Other Brother 25        Killed in WWII.     Brain cancer Daughter 5     No Medical Problems Daughter      No Medical Problems Son      No Medical Problems Son      No Medical Problems Son      Dementia Sister      Social History     Socioeconomic History     Marital status:      Spouse name: Not on file     Number of children: 6     Years of education: Not on file     Highest education level: Not on file   Occupational History     Employer: RETIRED     Comment: [place] before she retired.Retired at 72.   Social Needs     Financial resource strain: Not on file     Food insecurity     Worry: Not on file     Inability: Not on file     Transportation needs     Medical: Not on file     Non-medical: Not on file   Tobacco Use     Smoking status: Never Smoker     Smokeless tobacco: Never Used   Substance and Sexual Activity     Alcohol use: No     Drug use: No     Sexual activity: Not on file   Lifestyle      Physical activity     Days per week: Not on file     Minutes per session: Not on file     Stress: Not on file   Relationships     Social connections     Talks on phone: Not on file     Gets together: Not on file     Attends Holiness service: Not on file     Active member of club or organization: Not on file     Attends meetings of clubs or organizations: Not on file     Relationship status: Not on file     Intimate partner violence     Fear of current or ex partner: Not on file     Emotionally abused: Not on file     Physically abused: Not on file     Forced sexual activity: Not on file   Other Topics Concern     Not on file   Social History Narrative    , 6 children, active, is a full code.  (last updated 8/9/2017)      Allergies    Allergies   Allergen Reactions     Oxycodone Nausea And Vomiting     Codeine Nausea And Vomiting     Diazepam Nausea Only     Morphine Nausea And Vomiting       Review of Systems    General  General (WDL): All general elements are within defined limits  ENT  ENT (WDL): Exceptions to WDL  Glasses or Contacts: Yes - Chronic (Greater than 3 months)  Dentures: Yes - Chronic (Greater than 3 months)  Respiratory  Respiratory (WDL): Exceptions to WDL  Cardiovascular  Cardiovascular (WDL): Exceptions to WDL  Irregular Heart Beat: Yes - Chronic (Greater than 3 months)  Lightheadedness: Yes - Chronic (Greater than 3 months)  Endocrine  Endocrine (WDL): All endocrine elements are within defined limits  Gastrointestinal  Gastrointestinal (WDL): Exceptions to WDL  Constipation: Yes - Chronic (Greater than 3 months)  Blood from rectum: Yes - Recent (Less than 3 months)  Diarrhea: Yes - Recent (Less than 3 months)  Have had black or tan stools?: Yes - Recent (Less than 3 months)  Hemorrhoids: Yes - Recent (Less than 3 months)  Musculoskeletal  Musculoskeletal (WDL): Exceptions to WDL  Range of Motion Limitation: Yes - Chronic (Greater than 3 months)  Joint pain: Yes - Chronic (Greater than 3  months)  Back Pain: Yes - Chronic (Greater than 3 months)  Pain interfering with walking: Yes - Chronic (Greater than 3 months)  Recent fall: Yes - Recent (Less than 3 months)  Assistive device: Yes - Chronic (Greater than 3 months)(cane, walker)  Neurological  Neurological (WDL): Exceptions to WDL  Headaches: Yes - Chronic (Greater than 3 months)  Difficulty walking: Yes - Chronic (Greater than 3 months)  Difficulty with memory: Yes - Chronic (Greater than 3 months)  Dominant Hand: Right  Difficulty with Balance: Yes - Chronic (Greater than 3 months)  Psychological/Emotional  Psychological/Emotional (WDL): Exceptions to WDL  Daytime sleepiness: Yes - Chronic (Greater than 3 months)  Hematological/Lymphatic  Hematological/Lymphatic (WDL): All hematological/lymphatic elements are within defined limits  Dermatological  Dermatologic (WDL): All dermatological elements are within defined limits  Genitourinary/Reproductive  Genitourinary/Reproductive (WDL): Exceptions to WDL  Urinary Incontinence: Yes - Chronic (Greater than 3 months)  Urination more than 2 times a night: Yes - Chronic (Greater than 3 months)  Reproductive (Females only)     Pain  Currently in Pain: No/denies    Physical Exam    Recent Vitals 2/24/2021   Height -   Weight 143 lbs 14 oz   BSA (m2) 1.73 m2   /86   Pulse 96   Temp 97.7   Temp src 1   SpO2 98   Some recent data might be hidden       GENERAL: Alert and oriented to time place and person. Seated comfortably. In no distress.  She looks her stated age.  Very pleasant.  Normal build.  She could get onto the examining table with minimal assistance.  Slightly unsteady on her feet.  She uses a cane when she walks.  She also appears to have a little bit of memory problem.  Appears to be mostly for recent events rather than long-term memory.  No evident cranial nerve deficit.  Upper and lower extremity power appears to be normal.    HEAD: Atraumatic and normocephalic.    EYES: BENNIE, EOMI.  No  pallor.  No icterus.    Oral cavity: no mucosal lesion or tonsillar enlargement.    NECK: supple. JVP normal.  No thyroid enlargement.    LYMPH NODES: No palpable, cervical, axillary or inguinal lymphadenopathy.    CHEST: clear to auscultation bilaterally.  Resonant to percussion throughout bilaterally.  Symmetrical breath movements bilaterally.    CVS: S1 and S2 are heard. Regular rate and rhythm.  No murmur or gallop or rub heard.  No peripheral edema.    ABDOMEN: Soft. Not tender. Not distended.  Mild abdominal obesity.  No palpable hepatomegaly or splenomegaly.  No other mass palpable.  Bowel sounds heard.    EXTREMITIES: Warm.    SKIN: no rash, or bruising or purpura.  Has a full head of hair.      Lab Results    Recent Results (from the past 168 hour(s))   Hemoglobin   Result Value Ref Range    Hemoglobin 9.0 (L) 12.0 - 16.0 g/dL   Hemoglobin   Result Value Ref Range    Hemoglobin 8.7 (L) 12.0 - 16.0 g/dL   Hemoglobin   Result Value Ref Range    Hemoglobin 8.8 (L) 12.0 - 16.0 g/dL   Potassium - Next AM   Result Value Ref Range    Potassium 3.1 (L) 3.5 - 5.0 mmol/L   Basic Metabolic Panel   Result Value Ref Range    Sodium 141 136 - 145 mmol/L    Potassium 3.1 (L) 3.5 - 5.0 mmol/L    Chloride 106 98 - 107 mmol/L    CO2 27 22 - 31 mmol/L    Anion Gap, Calculation 8 5 - 18 mmol/L    Glucose 87 70 - 125 mg/dL    Calcium 8.7 8.5 - 10.5 mg/dL    BUN 8 8 - 28 mg/dL    Creatinine 0.72 0.60 - 1.10 mg/dL    GFR MDRD Af Amer >60 >60 mL/min/1.73m2    GFR MDRD Non Af Amer >60 >60 mL/min/1.73m2   Protime-INR   Result Value Ref Range    INR 1.24 (H) 0.90 - 1.10   CEA (Carcinoembryonic Antigen)   Result Value Ref Range    CEA 4.2 (H) 0.0 - 3.0 ng/mL   POCT Glucose    Specimen: Capillary; Blood   Result Value Ref Range    Glucose 98 70 - 139 mg/dL   INR   Result Value Ref Range    INR 1.60 (H) 0.90 - 1.10   HM1 (CBC with Diff)   Result Value Ref Range    WBC 8.7 4.0 - 11.0 thou/uL    RBC 4.46 3.80 - 5.40 mill/uL    Hemoglobin  10.7 (L) 12.0 - 16.0 g/dL    Hematocrit 35.3 35.0 - 47.0 %    MCV 79 (L) 80 - 100 fL    MCH 24.0 (L) 27.0 - 34.0 pg    MCHC 30.3 (L) 32.0 - 36.0 g/dL    RDW 24.1 (H) 11.0 - 14.5 %    Platelets 512 (H) 140 - 440 thou/uL    MPV 9.3 8.5 - 12.5 fL    Neutrophils % 68 50 - 70 %    Lymphocytes % 18 (L) 20 - 40 %    Monocytes % 12 (H) 2 - 10 %    Eosinophils % 2 0 - 6 %    Basophils % 0 0 - 2 %    Immature Granulocyte % 0 <=0 %    Neutrophils Absolute 5.9 2.0 - 7.7 thou/uL    Lymphocytes Absolute 1.5 0.8 - 4.4 thou/uL    Monocytes Absolute 1.1 (H) 0.0 - 0.9 thou/uL    Eosinophils Absolute 0.2 0.0 - 0.4 thou/uL    Basophils Absolute 0.0 0.0 - 0.2 thou/uL    Immature Granulocyte Absolute 0.0 <=0.0 thou/uL   Manual Differential   Result Value Ref Range    Platelet Estimate Increased (!) Normal    Ovalocytes 1+ (!) Negative    Target Cells 1+ (!) Negative       Imaging Results    Ct Chest Without Contrast    Result Date: 2/17/2021  EXAM: CT CHEST WO CONTRAST LOCATION: [facility] DATE/TIME: 2/17/2021 4:27 PM INDICATION: Colon mass. Evaluate metastatic disease. COMPARISON: CT abdomen-pelvis 02/17/2021. TECHNIQUE: CT chest without IV contrast. Multiplanar reformats were obtained. Dose reduction techniques were used. CONTRAST: None. FINDINGS: LUNGS AND PLEURA: 3 mm middle lobe nodule (series 2, image 91). Stable 2 mm peripheral left lower lobe nodule since 2014 lung bases CT abdomen pelvis and considered incidental (image 118). Mild basilar scarring and bronchiectasis. No pleural effusion. MEDIASTINUM/AXILLAE: No adenopathy. CORONARY ARTERY CALCIFICATION: Moderate-severe. UPPER ABDOMEN: Please see recent CT abdomen-pelvis. MUSCULOSKELETAL: No focal bony lesion. Bony demineralization. Stable mild T12 compression fracture to recent imaging. Old sternal fracture.     1.  Middle lobe 3 mm nodule is oblong in configuration which may suggest incidental etiology. However, this should still be followed in 3  months to ultimately exclude metastatic disease. 2.  No other evidence of metastatic disease in the chest.     Ct External Imaging Abdomen    Result Date: 2/23/2021  Images were obtained from an external facility.  Click PACS Images hyperlink to view images.  Textual results have been scanned into the media tab.    Nm Pet Ct Skull To Mid Thigh    Result Date: 2/23/2021  EXAM: NM PET CT SKULL TO MID THIGH LOCATION: [facility] DATE/TIME: 2/23/2021 10:58 AM INDICATION: Initial treatment planning and staging for malignant neoplasm of on ascending colon. COMPARISON: CT of the thorax dated 02/17/2021 and CT of the abdomen pelvis dated 02/14/2021. TECHNIQUE: Serum glucose level 98 mg/dL. One hour post intravenous administration of 9.3 mCi F-18 FDG, PET imaging was performed from the skull base to the mid thighs utilizing attenuation correction with concurrent axial CT and PET/CT image fusion. Dose  reduction techniques were used. FINDINGS: FDG avid lesion in the ascending colon (Max SUV 9.3) and transverse colon in the left hemiabdomen (Max SUV 8.5) suspicious for biopsy-proven adenocarcinoma without metastasis. The remaining FDG uptake is physiologic from the skull base to mid thigh. Moderate senescent intracranial changes. Postoperative change of the bilateral lenses. Mild carotid artery bifurcation calcification. Biapical lung scarring. Severe coronary artery calcium/stenting. Splenic granuloma. Bilateral renal cysts. Fibroid uterus. Pelvic phleboliths. Multilevel degenerative changes of the scoliotic spine.     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        Signed by: Linda Ng MD

## 2021-06-15 NOTE — PROGRESS NOTES
"Jaclyn Gibbs is a 88 y.o. female who is being evaluated via a billable telephone visit.      The patient has been notified of following:     \"This telephone visit will be conducted via a call between you and your physician/provider. We have found that certain health care needs can be provided without the need for a physical exam.  This service lets us provide the care you need with a short phone conversation.  If a prescription is necessary we can send it directly to your pharmacy.  If lab work is needed we can place an order for that and you can then stop by our lab to have the test done at a later time.    Telephone visits are billed at different rates depending on your insurance coverage. During this emergency period, for some insurers they may be billed the same as an in-person visit.  Please reach out to your insurance provider with any questions.    If during the course of the call the physician/provider feels a telephone visit is not appropriate, you will not be charged for this service.\"    Patient has given verbal consent to a Telephone visit? Yes    What phone number would you like to be contacted at? [phone]    Patient would like to receive their AVS by AVS Preference: Mail a copy.    Additional provider notes: I had an extensive discussion with the patient and her family regarding the PET scan images as well as her consultation with the hematology oncology.  After our discussion she would like to pursue surgical removal of the tumors.  I explained the procedure in detail which will include a laparoscopic approach with bowel resection and primary anastomosis.  There is a chance of a colostomy or an ileostomy if there are any worrisome findings which may prevent a safe anastomosis.  She and the family understands this.  We will plan on surgery accordingly.  She will need preop evaluation and a bowel prep.    It is my professional judgment, in conjunction with the current COVID-19 pandemic " recommended restrictions on elective procedures, that this procedure can safely be deferred until a later date which may be beyond the typical treatment period/window. I have engaged in a discussion with the patient (and family) about the need for this deferral, explained why the procedure can be safely deferred, the potential risks associated with the deferral, and answered all of the patients questions. The patient has also been advised that if there is a change in their condition/symptoms they should notify me, my clinic/facility, and/or seek appropriate medical care. That patient has also been informed that the decision to defer this procedure can be re-evaluated if there is a change in their condition/symptoms. I have also told the patient they have the right to seek a second opinion on the decision to defer their procedure.  Russell Britt DO [facility]  [phone]      Phone call duration: 15 minutes    Niles Steve CMA

## 2021-06-15 NOTE — TELEPHONE ENCOUNTER
Santhosh with [facility]  Calling to advised Dr. Banks of cancer screening.  Rajesh can be reached at  [phone] This is his cell phone.    Asked if Rajesh would like to talk to covering provider - he stated no it was not necessary.    He stated that patient has a new colon cancer, and she will be following up surgery and oncology.

## 2021-06-15 NOTE — TELEPHONE ENCOUNTER
..New Patient Oncology Nurse Navigator Note     Referring provider: Leigha Nolen MD     Referring Clinic/Organization: [facility]     Referred to (specialty): Med Onc    Requested provider (if applicable): N/A     Date Referral Received: 2/18/2021   Evaluation for: Colon Cancer     Clinical History (per Nurse review of records provided):    2/14/2021 [facility]:  Impression and Plan:  Jaclyn Gibbs is a 88 y.o. female with a complex medical history including chronic pulmonary emboli on warfarin therapy who presents to the  with four weeks of diarrhea and an episode of possible blood in the stool today. She later denies blood in her stool, but her  states it was present as a small amount on the surface of the stool when she called him into the bathroom. Patient appears to have some cognitive impairment and therefore history is quite limited. On serial abdominal examinations, she had no tenderness but given her age, comorbidities, and duration of symptoms, I did have her undergo abdominal imaging today which reveals an area of possible contraction in the ascending colon. This will need consideration for follow-up colonoscopy to rule out an underlying mass or other abnormality. There is no finding of diverticulitis or colitis. Patient has chronic anemia dating back to at least 2017 that I can tell (HGB 9.3 in 7/2019), and has recent iron studies through her primary care physician showing significant iron deficiency, and remains anemic today, but has not had a drastic drop to warrant hospitalization or transfusion. Her INR unfortunately is 5.0. Patient states that she sets up her own mediations and is a poor historian here, and I think she should have outpatient medication management discussion as I think she is a risk for taking her medications inappropriately. She does not need vitamin K or other intervention regarding this supratheraputic INR today. We discussed that she needs to  "hold the warfarin tonight, and have her INR rechecked tomorrow through her clinic. They did bring in a stool sample so this was sent off for culture, but it was not liquid enough to run C. Diff testing. They understand that we will call them only if there is something abnormal with the stool studies. Patient is stable for continued outpatient management. I would like her to call her primary care clinic tomorrow to discuss follow-up within the next two to three days in clinic for recheck of her symptoms and to discuss possible colonoscopy. Questions were answered. Return with grossly bloody stools, fever, severe abdominal pain, or with other emergent concerns.     Diagnosis:  ICD-10-CM   1. Chronic diarrhea of unknown origin K52.9   2. Supratherapeutic INR R79.1   3. Microcytic anemia D50.9   4. Abnormal abdominal CT scan R93.5     2/14/2021 [facility]:   CT Abdomen Pelvis W:   INDICATION: 4 weeks diarrhea, new malaise and possible lower GI bleeding.  IMPRESSION:   1.  No evidence for colitis. There are a few regions of presumed contraction within the ascending colon and the proximal transverse colon. If not recently performed, colonoscopy may be of benefit.   2.  Moderate compression superior endplate of T12.   3.  Cardiac enlargement.   4.  The gallbladder is distended without gallbladder wall thickening.      2/15/2021 Hospital admit from [facility]:  Jaclyn Gibbs is a 88 y.o. old female with history of chronic anemia, CAD, HTN, OAB presents from [facility] as a direct admission for a reported drop in hemoglobin and BRBPR as well as abnormal CT colon. [facility] physician reported the CT showed \"contractures in the colon\" and will ask our team to obtain these records for our EMR. Patient herself states that she does not recall any BRBPR or melena and she is fully oriented x4. Reports of INR > 7.0 in clinic and [facility] did dose vitamin K x1 already.     Here, she endorses feeling constipated " "although her last BM was just last night. She denies any melena, BRBPR now, hemoptysis, hematemesis, hematuria, bruising, or bleeding anywhere. She is aware her INR is high; she denies any medication changes or diet changes, or taking her pills incorrectly. Per [facility] MD, she had BRBPR and hemoglobin drop as well as a CT abd scan showing \"contractures\" in her colon and her INR today was > 7.0 and they administered vitamin K once. Patient states she does not know the \"details\" of her clinic visit and asks for dinner. No other complaints. The patient otherwise denies any recent travel domestically or internationally, and also denies any recent sick contacts including any family or friends who have been sick.  When asked, the patient denies any fevers, rigors, chest pain or shortness of breath, nausea or vomiting or abdominal pain, changes in bowel movements/pattern, urinary symptoms, focal weakness, or any other new and associated symptoms at this time.  The patient has been compliant with home medications as prescribed. 14 point review of systems performed with the patient without any other pertinent positives at this time. Presently, the patient's symptoms and pain are tolerable at the moment.    DATE/TIME: 2/17/2021 4:27 PM  EXAM: CT CHEST WO CONTRAST  LOCATION: [facility]  INDICATION: Colon mass. Evaluate metastatic disease.  COMPARISON: CT abdomen-pelvis 02/17/2021.  FINDINGS:   LUNGS AND PLEURA: 3 mm middle lobe nodule (series 2, image 91). Stable 2 mm peripheral left lower lobe nodule since 2014 lung bases CT abdomen pelvis and considered incidental (image 118). Mild basilar scarring and bronchiectasis. No pleural effusion.  IMPRESSION:   1.  Middle lobe 3 mm nodule is oblong in configuration which may suggest incidental etiology. However, this should still be followed in 3 months to ultimately exclude metastatic disease.  2.  No other evidence of metastatic disease in the " chest.     2/17/2021 Colonoscopy:  Findings:      - 2 x 15-20 mm sessile polyps in the ascending colon. Biopsied (Labeled ascending colon polyp biopsies)  - 1 x 18 mm sessile polyp in the descending ascending colon. This had an area of central depression. This was biopsied with a cold biopsy forceps (labeled distal ascending colon biopsy)  - 6 cm nearly circumferential mass located at the hepatic flexure. This was firm and friable. The scope was easily able to pass it. This is highly suggestive of a colon cancer. This was biopsied with a cold biopsy frocep. This was tattooed 3 folds distal to the mass.   - 3 cm mass located in the transverse colon (45 cm from the anal verge). This was friable and firm. This is highly suggestive of a synchronous colon cancer. This was biopsied with a cold biopsy forcep. This was tattooed 2 folds distal to the mass.   - Somewhat congested/edematous mucosa in the sigmoid colon with submucosal hemorrhage. This was not apparent on scope insertion and so I suspect it may have been related to scope trauma due to the prolonged procedure.   - Few small sigmoid colon diverticula.   - Internal hemorrhoids. External skin tags.     2/17/2021 Surgery consult: Russell Britt  Assessment/Plan:   Jaclyn Gibbs is a 88 y.o. female with 2 separate: Masses worrisome for malignancy.  There is a likelihood of synchronous cancers of the colon one in the hepatic flexure one in the reported transverse colon although was noted at approximately 45 cm from the anal verge which places this some distance from the ileocecal valve and the hepatic flexure.  I discussed the pathophysiology of colon cancer and the natural progress of the disease.  There does not appear to be any signs or symptoms of obstruction however she does have reported hematochezia likely from the masses.  Being on Coumadin for history of pulmonary embolus does not help the situation either.  We discussed options including surgery with  continued work-up as an outpatient versus something more immediate.  Currently she is hemodynamically stable and does not appear to have any significant hematochezia albeit she does have chronic anemia which is likely secondary to her overall colon pathology.  Regardless I think this can be managed as an outpatient and I would be more than happy to see her in the clinic setting for ongoing work-up and evaluation.  She does have CT scan of the chest which was relatively unremarkable and a CEA which is pending.  Given the distance of the transverse colon mass relatively close to the anal verge she may warrant repeat imaging with PET scanning for further evaluation and  surgical planning.  -In the meantime I think we can continue with ongoing medical management while in the hospital.  We will continue to follow along.      2/17/2021 Pathology:  Final Diagnosis   A) CECUM/ASCENDING COLON POLYP BIOPSY:         - SESSILE SERRATED ADENOMA ×1         - MUCOSAL EXCRESCENCE ×1     B) DISTAL ASCENDING COLON BIOPSY:         - AT LEAST TUBULOVILLOUS ADENOMA WITH HIGH GRADE DYSPLASIA     C) COLON HEPATIC FLEXURE MASS BIOPSY:         - INVASIVE MODERATELY DIFFERENTIATED ADENOCARCINOMA         - PENDING MMR PANEL     D) TRANSVERSE COLON MASS BIOPSY:         - INVASIVE MODERATELY DIFFERENTIATED ADENOCARCINOMA         - PENDING MMR PANEL        Clinical Assessment / Barriers to Care (Per Nurse):   Navigator called pt's home and initially spoke with , Abdullahi. He was confused about who caller was and what was wanted. Their daughter Aydee got on the phone and was able to work with writer to get patient scheduled for oncology consult. She explains that both parents are a bit overwhelmed, and there are several specialists involved. She also comments that the result of the SLUMS test by OT in hospital is confirmation of what they observed happening to the pt in terms of her memory.     Dtr was not aware, that this patient saw   Hernandez in 2017 for a benign heme consult. She agreed to have pt see Dr. Ng again for this problem.    She states that in talking with Dr. Britt from surgery in hospital, he indicated that there will likely be another CT or scan that oncology will want. This question has been sent to Dr. Ng.     Writer explained I would be sending a Consent to Communicate form to have them update, as currently it is only the pt's . Her two brothers are listed in chart, however she states that they are not up to date on what is going on at this point.     Aydee requests that all appt information be emailed to her, and she will print and provide for her parents.     Additionally she requests that scheduling calls come to her. Pt was asleep so this could not be verified at the time.      Records Location (Care Everywhere, Media, etc.): Care Everywhere, Media: [facility] visit and CT;  Stephen office visit, [facility].      Records Needed: Images from [facility] CT Abd; Stephen notes     Additional testing needed prior to consult: Potential of PET scan or other, per Dr. Ng.

## 2021-06-15 NOTE — ANESTHESIA POSTPROCEDURE EVALUATION
Patient: Jaclyn Gibbs  Procedure(s):  COLONOSCOPY WITH BIOPSIES  Anesthesia type: MAC    Patient location: PACU  Last vitals:   Vitals Value Taken Time   /78 02/17/21 1552   Temp 36.7  C (98.1  F) 02/17/21 1552   Pulse 82 02/17/21 1806   Resp 16 02/17/21 1552   SpO2 100 % 02/17/21 1552   Vitals shown include unvalidated device data.  Post vital signs: stable  Level of consciousness: awake and responds to simple questions  Post-anesthesia pain: pain controlled  Post-anesthesia nausea and vomiting: no  Pulmonary: unassisted, return to baseline  Cardiovascular: stable and blood pressure at baseline  Hydration: adequate  Anesthetic events: no    QCDR Measures:  ASA# 11 - Tanesha-op Cardiac Arrest: ASA11B - Patient did NOT experience unanticipated cardiac arrest  ASA# 12 - Tanesha-op Mortality Rate: ASA12B - Patient did NOT die  ASA# 13 - PACU Re-Intubation Rate: ASA13B - Patient did NOT require a new airway mgmt  ASA# 10 - Composite Anes Safety: ASA10A - No serious adverse event    Additional Notes:

## 2021-06-15 NOTE — TELEPHONE ENCOUNTER
Addendum: Writer emailed pt's daughter this email with attachments bolded:    Kemar Bajwa,   I was so glad I was able to speak with you today and get Jaclyn s appointment scheduled with Dr. Ng.    As I mentioned, I am sending several attachments:   ? The Welcome letter has all of the appointment information.   ? Please print and complete the 2020 Patient Intake form and have Jaclyn bring it to the appointment.   o bring a current Medication List too  ? Map to [facility] - with our parking lot marked  ? Bio on Dr. Ng  ? My Navigation Introduction letter can give you a sense about how I can support you, and has a page of useful phone numbers  ? Consent to Communicate form ([id number] XXX DENNIS Consent to Communicate)    Please do not hesitate to call or email me with questions.   If you have something to ask that is specific to the appointment, you can call the clinic at [phone].     Additionally, I did reach out to Dr. Ng about whether he wanted to order another scan for Jaclyn, and he said he will wait until he meets with her, and then decide.     Please take gentle care of yourselves; especially during this waiting period.   Nova

## 2021-06-15 NOTE — ANESTHESIA PREPROCEDURE EVALUATION
Anesthesia Evaluation      Patient summary reviewed   No history of anesthetic complications     Airway   Mallampati: II  Neck ROM: full   Pulmonary - normal exam   (+) shortness of breath,                          Cardiovascular - normal exam  (+) hypertension, CAD, ,      Neuro/Psych    (+) neuromuscular disease,  CVA ,     Endo/Other       GI/Hepatic/Renal    (+) GERD,             Dental - normal exam                        Anesthesia Plan  Planned anesthetic: MAC    ASA 4     Anesthetic plan and risks discussed with: patient  Anesthesia plan special considerations: antiemetics,   Post-op plan: routine recovery

## 2021-06-15 NOTE — TELEPHONE ENCOUNTER
Received MTM referral from Hospital Discharge Transitions of Care     Patient declined. Patient has a nurse that comes to her house and manages her medications for her. If she feels like an appt is needed, she will reach out to us.      Thank you for the referral,    Giancarlo Man, MTM coordinator

## 2021-06-15 NOTE — TELEPHONE ENCOUNTER
Pt's daughter, Aydee, calls navigator concerned about medical insurance which is through [employer]. When they were at the surgeon yesterday this came up. It was determined that she would be covered with the surgeon, but it concerned about the oncology clinic.     Writer spoke with BELKIS Davenport to verify best approach. Called dtr back to recommended that they call [employer] directly to clarify/verify pt's coverage. Informed her that some of our patients have [employer] and have continued with coverage, however we cannot know the specifics of any particular plan. Also suggested that they can call the Senior Linkage Line if they need assistance in changing insurances.     Daughter states they will continue with appt tomorrow with Dr. Ng, and continue to research the issue. Writer will continue to follow.

## 2021-06-15 NOTE — PROGRESS NOTES
Patient is here for consultation of colon cancer.  Accompanied by spouse, Abdullahi.  Judie Sorto RN

## 2021-06-15 NOTE — PROGRESS NOTES
" HPI: Jaclyn Gibbs is here for follow up to discuss the results of her colonoscopy biopsy results that was done in the hospital.  Currently she is doing relatively well but is adamant that she have the colon cancer removed.  She has no symptoms of abdominal pain or hematochezia present.  She does have an oncology appointment for this Wednesday.    Allergies, Medications, Social History, Past Medical History and Past Surgical History were reviewed and are noted in the chart.    BP (!) 150/92   Ht 5' 5\" (1.651 m)   Wt 145 lb (65.8 kg)   Breastfeeding No   BMI 24.13 kg/m    Body mass index is 24.13 kg/m .      EXAM:   GENERAL: Appears well           Assessment/Plan: Jaclyn Gibbs continues to do well. Her pathology was reviewed.  She has 2 separate areas that are consistent with adenocarcinoma of the colon.  The hepatic flexure and the transverse colon are both noted.  I also reviewed the genetic studies as well.  I had an extensive discussion with her and her family at the time of this visit.  We reviewed the pathology and the plans moving forward.  At this point I will order a PET scan from her for evaluation of her tumor burden as well as surgical planning.  We did discuss risk and benefits of surgery and she is fairly adamant about having these tumors removed which I think given her overall status is reasonable.  Once the PET scan is done I will call her with the results.    Ko Britt  DO [facility] Department of Surgery  "

## 2021-06-15 NOTE — TELEPHONE ENCOUNTER
Spoke with Aydee (patients daughter) and scheduled Jaclyn's surgery with Dr. Britt. Went over surgery details:    We've received instruction to get you scheduled for surgery with Dr. Ko Britt. We have that arranged as follows:     Surgery Date: Friday March 26th  Location:  [address] [address]   Arrival Time: 5:30 AM (unless instructed otherwise by the pre-op nurse)    Prep Instructions:     1. Please schedule a pre-op physical with your primary care doctor. (Already scheduled for 3/2/21)    2. COVID19 testing is required within 4 days of surgery. We have this scheduled for you at [facility], [address], [place] on Tuesday March 23rd at 10:00 AM. If your test is positive, your surgery will be canceled.     3. Please follow the bowel prep instructions.    4. No blood thinners including aspirin for one week prior to surgery. Verify this is safe for you with your primary care doctor before stopping.     5. Visitor restrictions are in place due to the pandemic. One visitor is allowed for adult surgical patients. Please verify this with the pre-op nurse when they call before surgery because it is subject to change.    6. If you are going home the same day of surgery, you need an adult to drive you home and stay with you 24 hours after surgery.      7. When you arrive to the hospital, you will be screened for COVID19 symptoms. If you screen positive, your surgery will need to be postponed for your safety.    8. The community is experiencing a surge in COVID19 hospital admissions which is impacting bed availability at all hospitals. If you are being admitted overnight or longer following surgery, please be aware that your procedure could be cancelled as a result.     9. We always encourage you to notify your insurance any time you have something scheduled including surgery. The number is usually right on the back of your insurance card. Please call [facility]  [facility] Cost of Care at [phone] if you need pricing information.     Call our office if you have any questions! Thank you!     Pearl GALLEGOS  Surgery Scheduler  [facility]  Surgery [facility]  Direct line: [phone]   Main Line: [phone]  Direct Fax: [phone]

## 2021-06-15 NOTE — TELEPHONE ENCOUNTER
Former patient of [facility] & has not established care with another provider.  Please assign refill request to covering provider per Clinic standard process.      RN cannot approve Refill Request    RN can NOT refill this medication Protocol failed and NO refill given and no pcp. Last office visit: 1/27/2020 Pancho Banks MD Last Physical: Visit date not found Last MTM visit: Visit date not found Last visit same specialty: Visit date not found.  Next visit within 3 mo: Visit date not found  Next physical within 3 mo: Visit date not found      Antelmo Jean Baptiste, Care Connection Triage/Med Refill 2/22/2021    Requested Prescriptions   Pending Prescriptions Disp Refills     ferrous sulfate 325 (65 FE) MG tablet [Pharmacy Med Name: IRON 65MG TAB] 90 tablet 0     Sig: TAKE 1 TABLET BY MOUTH THREE TIMES DAILY WITH MEALS       There is no refill protocol information for this order

## 2021-06-15 NOTE — TELEPHONE ENCOUNTER
Records in Epic Chart Review / Radiology images in Nil.  Previous hematology consult  by Dr. Ng on 10/2/2017.  Care Everywhere: [facility].    Epic Notes  2/18/2021 - Discharge Summary / Admission 2/15/2021.                   - 2/17/2021 Consult /General Surgery, colon mass. Dr. Britt.                   - 2/17/2021 Op Note / Colonoscopy. Dr. Rajesh Pagan                   - 2/16/2021 Progress Notes / Hospitalist. Dr. Leigha Nolen.                   - 2/16/2021 Consult / Gastroenterology. Dr. Pagan.                   - 2/15/2021 H & P. Dr. Archie Pastor.    Labs  2/18/2021 - 2/15/2021 Hospital results.  2/17/2021 - Surgical Pathology / colonoscopy biopsies.    Imaging  2/17/2021 - CT Chest    Media  - [facility]  2/15/2021 - Progress Note Ext / Follow up after [facility] Room, Dl Carey PA-C  2/14/2021 - Progress Note Ext / ED Note. [facility].  2/11/2021 - Progress Note Ext / Diarrhea, Dl Carey PA-C.  1/21/2021 - Progress Note Ext / Follow up,anticoagulant manag't. Dr. Melani Dave.    Care Everywhere: [facility]  2/14/2021 - ED / [facility], labs & CT Abdomen Pelvis.   CT images have been requested

## 2021-06-15 NOTE — TELEPHONE ENCOUNTER
ANTICOAGULATION  MANAGEMENT PROGRAM    Jaclyn Gibbs is being discharged from the [facility] Anticoagulation Management Program (AC).    Reason for discharge: care has been transferred to [facility].      - Clinic is closer to home.     ACM referral closed, anticoagulation episode resolved and INR standing order discontinued.     If Jaclyn needs warfarin management in the future, please send a new referral.    Dory Montenegro RN

## 2021-06-15 NOTE — ANESTHESIA CARE TRANSFER NOTE
2Last vitals:   Vitals:    02/17/21 1442   BP: 129/59   Pulse: 68   Resp: 12   Temp: 36.8  C (98.2  F)   SpO2: 100%     Patient's level of consciousness is drowsy  Spontaneous respirations: yes  Maintains airway independently: yes  Dentition unchanged: yes  Oropharynx: oropharynx clear of all foreign objects    QCDR Measures:  ASA# 20 - Surgical Safety Checklist: WHO surgical safety checklist completed prior to induction    PQRS# 430 - Adult PONV Prevention: NA - Not adult patient, not GA or 3 or more risk factors NOT present  ASA# 8 - Peds PONV Prevention: NA - Not pediatric patient, not GA or 2 or more risk factors NOT present  PQRS# 424 - Tanesha-op Temp Management: NA - MAC anesthesia or case < 60 minutes  PQRS# 426 - PACU Transfer Protocol: - Transfer of care checklist used  ASA# 14 - Acute Post-op Pain: ASA14B - Patient did NOT experience pain >= 7 out of 10

## 2021-06-15 NOTE — TELEPHONE ENCOUNTER
----- Message from Brooke Beth RN sent at 2/24/2021 11:00 AM CST -----  Regarding: RE: URGENT  Well this was already performed so I will have to call them to see what now.  ----- Message -----  From: Pearl Man CMA  Sent: 2/24/2021  10:25 AM CST  To: General Surgery Scheduling Registration Pool, #  Subject: URGENT                                           Humanjos[employer] luis called back. They can't approve the PET Scan and want to know if Balwinder wants to withdraw the request or wants to change the order to CT Abdomin and Pelvis.     [facility] needs a call back today.    I do not do PA's for Radiology. I think the rep was transferred to me twice.      Pearl Licea  ----- Message -----  From: Pearl Man CMA  Sent: 2/23/2021   4:51 PM CST  To: General Surgery Support Pool  Subject: PA needed for CT                                 Received a call from [facility] for a PA request. They are looking for request for a CT ABD PELVIS. They said the couldn't leave any patient information but the case number is: [id number]. This is time sensitive and the case can be withdrawn if no call back.    BALWINDER PATIENT    Please call [facility] at [phone]    Ghanshyam Licea

## 2021-06-15 NOTE — TELEPHONE ENCOUNTER
I called [employer] and received for Authorization for the PET scan performed on 2/23/2021. A copy of this auth has also been faxed to our clinic to be sent to scanning.    Auth #[id number] 2/23/2021

## 2021-06-15 NOTE — PATIENT INSTRUCTIONS - HE
Colon education & surgery packet provided to pt.    AUNDREA [facility]  P: [phone] I F: [phone]

## 2021-06-15 NOTE — PROGRESS NOTES
Seen patient today, no new concerns, spoke with daughter as she is concerned that patient changes med box after they set it, checked it today and was set correctly, daughter states they are taking her to clinic for INR, should be ok to DC at next SN visit this week if remains stable.

## 2021-06-16 NOTE — ANESTHESIA POSTPROCEDURE EVALUATION
Patient: Jaclyn Gibbs  Procedure(s):  COLECTOMY, PARTIAL, LAPAROSCOPIC-EXTENDED RIGHT, SPLENIC FLEXURE MOBILIZATION  Anesthesia type: general    Patient location: PACU  Last vitals:   Vitals Value Taken Time   /63 03/26/21 1245   Temp 36.9  C (98.5  F) 03/26/21 1200   Pulse 66 03/26/21 1257   Resp 15 03/26/21 1257   SpO2 96 % 03/26/21 1257   Vitals shown include unvalidated device data.  Post vital signs: stable  Level of consciousness: awake and responds to simple questions  Post-anesthesia pain: RN treating mild-mod pain  Post-anesthesia nausea and vomiting: yes  Pulmonary: unassisted, nasal cannula  Cardiovascular: stable and blood pressure at baseline  Hydration: adequate  Anesthetic events: no    QCDR Measures:  ASA# 11 - Tanesha-op Cardiac Arrest: ASA11B - Patient did NOT experience unanticipated cardiac arrest  ASA# 12 - Tanesha-op Mortality Rate: ASA12B - Patient did NOT die  ASA# 13 - PACU Re-Intubation Rate: ASA13B - Patient did NOT require a new airway mgmt  ASA# 10 - Composite Anes Safety: ASA10A - No serious adverse event    Additional Notes:

## 2021-06-16 NOTE — ANESTHESIA PREPROCEDURE EVALUATION
Anesthesia Evaluation      Patient summary reviewed   History of anesthetic complications (PONV)     Airway   Mallampati: III  Neck ROM: full   Pulmonary - normal exam   (-) shortness of breath    ROS comment: PE hx                         Cardiovascular   Exercise tolerance: > or = 4 METS  (+) hypertension, past MI, CAD, CABG/stent (PRIMITIVO), dysrhythmias (LBBB, Paroxysmal SVT, a.fib, PVC), , hypercholesterolemia,     (-) angina  ECG reviewed  Rhythm: irregular  Rate: normal,      ROS comment: Recurrent DVT, PE  On coumadin    NM 3/15/2019  Conclusion      The pharmacologic nuclear stress test is abnormal.    There is a small area of nontransmural infarct in the apical segment(s) of the left ventricle.    The left ventricular ejection fraction is 50%.    When compared to the images of 8/14/2018, the previously reported ischemic area is no longer present. the apical infarct is unchanged.    8/26/19 Echo  Summary      1.Left ventricle ejection fraction is lower limits of normal. The calculated left ventricular ejection fraction is 54%.    2.TAPSE is normal, which is consistent with normal right ventricular systolic function.    3.No hemodynamically significant valvular heart abnormalities, minimal regurgitant lesions as mentioned below.    4.When compared to the previous study dated 7/10/2012, left ventricular ejection fraction is less robust than on prior study.         Neuro/Psych    (+) neuromuscular disease,  CVA , dementia,     Comments: Scoliosis    Endo/Other       Comments: Anemia    GI/Hepatic/Renal    (+) GERD intermittent,        Other findings: COVID PCR NEG 3/23/21      Dental    (+) upper dentures                       Anesthesia Plan  Planned anesthetic: general endotracheal and total IV anesthesia  Glidescope    TIVA with propofol and precedex  Magnesium    Decadron  Zofran    Reverse with Sugammadex    ASA 3   Induction: intravenous   Anesthetic plan and risks discussed with: patient and  spouse  Anesthesia plan special considerations: video-assisted, antiemetics, dexmedetomidine  Post-op plan: routine recovery

## 2021-06-16 NOTE — TELEPHONE ENCOUNTER
Aydee daughter of Jaclyn calling patient had surgery with Dr. Britt and they are looking for the pathology results.    Aydee is on the Consent to Communicate.    Thanks!

## 2021-06-16 NOTE — ANESTHESIA CARE TRANSFER NOTE
Last vitals:   Vitals:    03/26/21 0606   BP: 142/82   Pulse: 96   Resp: 16   Temp: 37.2  C (98.9  F)   SpO2: 98%     Patient's level of consciousness is drowsy  Spontaneous respirations: yes  Maintains airway independently: yes  Dentition unchanged: yes  Oropharynx: oropharynx clear of all foreign objects    QCDR Measures:  ASA# 20 - Surgical Safety Checklist: WHO surgical safety checklist completed prior to induction    PQRS# 430 - Adult PONV Prevention: 4558F - Pt received => 2 anti-emetic agents (different classes) preop & intraop  ASA# 8 - Peds PONV Prevention: NA - Not pediatric patient, not GA or 2 or more risk factors NOT present  PQRS# 424 - Tanesha-op Temp Management: 4559F - At least one body temp DOCUMENTED => 35.5C or 95.9F within required timeframe  PQRS# 426 - PACU Transfer Protocol: - Transfer of care checklist used  ASA# 14 - Acute Post-op Pain: ASA14B - Patient did NOT experience pain >= 7 out of 10

## 2021-06-16 NOTE — TELEPHONE ENCOUNTER
Telephone Encounter by Dory Montenegro RN at 3/11/2020 10:42 AM     Author: Dory Montenegro RN Service: -- Author Type: Registered Nurse    Filed: 3/11/2020  2:12 PM Encounter Date: 3/11/2020 Status: Attested    : Dory Montenegro RN (Registered Nurse) Cosigner: Pancho Banks MD at 3/11/2020  3:12 PM    Attestation signed by Pancho Banks MD at 3/11/2020  3:12 PM    ok                ANTICOAGULATION  MANAGEMENT    Assessment     Today's INR result of 2.30 is Therapeutic (goal INR of 2.0 - 2.5)        Warfarin taken differently than instructed, but no impact to total weekly dose    No new diet changes affecting INR    Potential interaction between Methylprednisone and warfarin which may affect subsequent INRs    Continues to tolerate warfarin with no reported s/s of bleeding or thromboembolism     Previous INR was Therapeutic at 2.50 on 2/28/20.    Post TF-LEO injection of lumbar/sacral on 2/28/20.    Plan:     Spoke with Jaclyn regarding INR result and instructed:     Warfarin Dosing Instructions:  (evenings. Has 5mg tabs)   - Continue current warfarin dose 7.5 mg daily on Mondays; and 5 mg daily rest of week.     Instructed patient to follow up no later than: 2 wks.   - INR scheduled on 3/25/20 @ [facility],    Education provided: target INR goal and significance of current INR result, potential interaction between warfarin and Methylprednisone and importance of notifying clinic for changes in medications    Jaclyn verbalizes understanding and agrees to warfarin dosing plan.    Instructed to call the [facility] for any changes, questions or concerns. (#[phone])   ?   Dory Montenegro RN    Subjective/Objective:      Jaclyn Gibbs, a 87 y.o. female is on warfarin.     Jaclyn reports:     Home warfarin dose: template incorrect; verbally confirmed home dose with Jaclyn and updated on anticoagulation calendar     Missed doses: No     Medication changes:  Yes:  2/28/20 LEO  injection (methylprednisone) of Lumbosacral spine.     S/S of bleeding or thromboembolism:  No     New Injury or illness:  No     Changes in diet or alcohol consumption:  No     Upcoming surgery, procedure or cardioversion:  No    Anticoagulation Episode Summary     Current INR goal:      TTR:   52.7 % (1 y)   Next INR check:   3/25/2020   INR from last check:   2.30 (3/11/2020)   Goal at result date:   2.0-3.0   Weekly max warfarin dose:      Target end date:   Indefinite   INR check location:      Preferred lab:      Send INR reminders to:   [facility]    Indications    Other acute pulmonary embolism without acute cor pulmonale (H) (Resolved) [I26.99]           Comments:   Goal range 2.0 - 2.5 per Dr. Cardona, 12/12/18         Anticoagulation Care Providers     Provider Role Specialty Phone number    Robin Catherine MD Referring Internal Medicine [phone]

## 2021-06-17 NOTE — PROGRESS NOTES
Office Visit - Follow Up   Jaclyn Gibbs   85 y.o. female    Date of Visit: 4/11/2018    Chief Complaint   Patient presents with     Follow-up     fasting, needs INR check        Assessment and Plan   1. Essential hypertension  Pains are ready for me to  - potassium chloride (K-DUR,KLOR-CON) 10 MEQ tablet; Take 1 tablet (10 mEq total) by mouth daily.  Dispense: 90 tablet; Refill: 3  - HM2(CBC w/o Differential)  - Basic Metabolic Panel    2. Pulmonary embolism  Had a DVT which progressed to pulmonary embolism.  On chronic warfarin therapy.  Due for INR today.    3. Hypercholesteremia  Controlled.  Does not need medication to control her hyperlipidemia.  Lipids on 12/19/2017 were good , HDL 68, triglycerides 80 and total cholesterol 218.  Check the following.  - Lipid Cascade  - Thyroid Stimulating Hormone (TSH)  - Hepatic Profile    4. Paroxysmal supraventricular tachycardia  Now well controlled.  Does not experience rapid heart rate  or supraventricular tachycardia.  Followed by cardiology Dr. Cardona.  Sees cardiology once a year.    5. Spinal stenosis of lumbar region with radiculopathy, L5 to S1  Has recurring back pains for which she went to the spinal care clinic.  Had epidural cortisone shot.  So also neurosurgery Dr. Tariq and Dr. Aponte for which she was not recommended to undergo any kind surgery.  Complains of continued low back pains and some gait imbalance.  Will refer her to PT OT.  - Ambulatory referral to PT/OT    6. Lower extremity weakness  Also has intermittent lower extremity weakness which I think related to her spinal stenosis.  Will refer to PT OT for strengthening exercises.  - Ambulatory referral to PT/OT    7. BPPV (benign paroxysmal positional vertigo)  Has benign paroxysmal positional vertigo which comes and goes.  Happened about a week ago  which lasted only for 1 day off and on.  Now does not have any dizziness or vertigo.    8. Long term current use of  anticoagulant therapy  Due for her INR check. 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  - INR     Reviewed spinal care clinic notes and recommendations.  Reviewed neurosurgery notes and discussed with the patient.    Follow up in 4 months.     History of Present Illness   This 85 y.o. old female is here for follow-up and has several medical problems.  Complains of lower back pains, gait imbalance and some lower extremity weakness.  Thought to be due to her lumbar spinal stenosis.  Get epidural cortisone shot to her lower back.  Got some partial relief.  But continues to have back pains.  Saw  neurosurgery and was recommended not to have surgery for her back problem.  Has BPPV.  Get dizzy or vertiginous a week ago which lasted only off and on for 1 day.  Has hyperlipidemia controlled without need for medication.  Has proximal supraventricular tachycardia.  Followed by cardiology.  Does not  experience rapid heart rate.  Has hypertension controlled by her medications.  Gets hypokalemia from indapamide.  Takes potassium supplement.  Overall feels well    Review of Systems   A 12 point comprehensive review of systems was negative except as noted..     Medications, Allergies and Problem List   Reviewed and updated             Chief Complaint   Follow-up (fasting, needs INR check)       Patient Profile   Social History     Social History Narrative    , 6 children, active, is a full code.  (last updated 8/9/2017)         Past Medical History   Patient Active Problem List   Diagnosis     DVT left leg 2008 after TKA     Benign Pigmented Nevus     Seborrheic Keratosis     Basal Cell Carcinoma Of The Skin Of The Face     Lacunar Stroke     Obesity     Osteopenia     Essential hypertension with goal blood pressure less than 140/90     Coronary Artery Disease     Paroxysmal Supraventricular Tachycardia     Palpitations     Hypercholesteremia     Lower Back Pain     Spinal stenosis of lumbar  region with radiculopathy, L5 to S1     Weakness of both lower extremities     Back pain     GERD (gastroesophageal reflux disease)     Insomnia, unspecified type     Lower extremity weakness     Scoliosis of thoracolumbar spine     Left lumbar radiculopathy     Pulmonary embolism     Other acute pulmonary embolism without acute cor pulmonale       Past Surgical History  She has a past surgical history that includes Appendectomy;  section (); pr ablate heart dysrhythm focus (2014); Cardiac catheterization; Coronary angioplasty (2010); Cataract extraction (Bilateral); Excision basal cell carcinoma; Lumbar laminectomy (); Total knee arthroplasty (Right, 2003); and Total knee arthroplasty (Left, 2008).       Medications and Allergies   Current Outpatient Prescriptions   Medication Sig     acetaminophen (TYLENOL) 325 MG tablet Take 2 tablets (650 mg total) by mouth every 4 (four) hours as needed.     amLODIPine (NORVASC) 5 MG tablet Take 1 tablet (5 mg total) by mouth daily.     CALCIUM CARB/MAGNESIUM OXID/D3 (CALCIUM MAGNESIUM + D ORAL) Take by mouth 2 (two) times a day. 750/300/500mg     fluocinonide (LIDEX) 0.05 % cream Apply topically 2 (two) times a day.     GLUCOSAMINE/CHONDROITIN SULF A (GLUCOSAMINE-CHONDROITIN ORAL) Take 2 tablets by mouth daily.     indapamide (LOZOL) 2.5 MG tablet TAKE 1 TABLET EVERY DAY     losartan (COZAAR) 100 MG tablet Take 1 tablet (100 mg total) by mouth daily.     lutein 20 mg cap Take 1 capsule by mouth daily.     multivitamin (ONE A DAY) per tablet Take 1 tablet by mouth daily.     nitroglycerin (NITROSTAT) 0.4 MG SL tablet Place 1 tablet (0.4 mg total) under the tongue as needed for chest pain (Keep in original bottle).     OMEGA-3/DHA/EPA/FISH OIL (FISH OIL-OMEGA-3 FATTY ACIDS) 300-1,000 mg capsule Take 1 g by mouth daily.     omeprazole (PRILOSEC) 20 MG capsule Take 20 mg by mouth daily as needed.      oxybutynin (DITROPAN) 5 MG tablet Take 1  "tablet (5 mg total) by mouth at bedtime.     potassium chloride (K-DUR,KLOR-CON) 10 MEQ tablet Take 1 tablet (10 mEq total) by mouth daily.     triamcinolone (KENALOG) 0.1 % cream Apply topically 2 (two) times a day as needed.     warfarin (COUMADIN) 5 MG tablet Take 1 or 1&1/2 tablets (5mg or 7.5mg) by mouth daily, as directed.   Adjust dose based on INR results.     Allergies   Allergen Reactions     Oxycodone Nausea And Vomiting     Codeine Nausea And Vomiting     Diazepam Nausea Only     Morphine Nausea And Vomiting        Family and Social History   Family History   Problem Relation Age of Onset     Stroke Mother 88     Hypertension Father      Sudden death Father 70     suspected heart attack      Breast cancer Sister      Venous thrombosis Sister 80     At age 80, and had breast CA     Cirrhosis Sister 34     Aneurysm Sister      Other Brother 25     Killed in WWII.     Brain cancer Daughter 5     No Medical Problems Daughter      No Medical Problems Son      No Medical Problems Son      No Medical Problems Son      Dementia Sister         Social History   Substance Use Topics     Smoking status: Never Smoker     Smokeless tobacco: Never Used     Alcohol use No        Physical Exam       Physical Exam  /90  Pulse 80  Ht 5' 3\" (1.6 m)  Wt 170 lb (77.1 kg)  SpO2 96%  BMI 30.11 kg/m2  General appearance: alert, appears stated age, cooperative and no distress  Head: Normocephalic, without obvious abnormality, atraumatic  Throat: lips, mucosa, and tongue normal; teeth and gums normal  Neck: no adenopathy, no carotid bruit, no JVD, supple, symmetrical, trachea midline and thyroid not enlarged, symmetric, no tenderness/mass/nodules  Back: symmetric, no curvature. ROM normal. No CVA tenderness.  Lungs: clear to auscultation bilaterally  Heart: regular rate and rhythm, S1, S2 normal, no murmur, click, rub or gallop  Abdomen: soft, non-tender; bowel sounds normal; no masses,  no organomegaly  Extremities: " extremities normal, atraumatic, no cyanosis or edema  Skin: Skin color, texture, turgor normal. No rashes or lesions  Neurologic: Alert and oriented X 3, normal strength and tone. Normal symmetric reflexes. Normal coordination and gait   Musculoskeletal: Tender and decreased range of motion lower back     Additional Information        Pancho Banks MD  Internal Medicine  Contact me at [phone]     Additional Information   Current Outpatient Prescriptions   Medication Sig     acetaminophen (TYLENOL) 325 MG tablet Take 2 tablets (650 mg total) by mouth every 4 (four) hours as needed.     amLODIPine (NORVASC) 5 MG tablet Take 1 tablet (5 mg total) by mouth daily.     CALCIUM CARB/MAGNESIUM OXID/D3 (CALCIUM MAGNESIUM + D ORAL) Take by mouth 2 (two) times a day. 750/300/500mg     fluocinonide (LIDEX) 0.05 % cream Apply topically 2 (two) times a day.     GLUCOSAMINE/CHONDROITIN SULF A (GLUCOSAMINE-CHONDROITIN ORAL) Take 2 tablets by mouth daily.     indapamide (LOZOL) 2.5 MG tablet TAKE 1 TABLET EVERY DAY     losartan (COZAAR) 100 MG tablet Take 1 tablet (100 mg total) by mouth daily.     lutein 20 mg cap Take 1 capsule by mouth daily.     multivitamin (ONE A DAY) per tablet Take 1 tablet by mouth daily.     nitroglycerin (NITROSTAT) 0.4 MG SL tablet Place 1 tablet (0.4 mg total) under the tongue as needed for chest pain (Keep in original bottle).     OMEGA-3/DHA/EPA/FISH OIL (FISH OIL-OMEGA-3 FATTY ACIDS) 300-1,000 mg capsule Take 1 g by mouth daily.     omeprazole (PRILOSEC) 20 MG capsule Take 20 mg by mouth daily as needed.      oxybutynin (DITROPAN) 5 MG tablet Take 1 tablet (5 mg total) by mouth at bedtime.     potassium chloride (K-DUR,KLOR-CON) 10 MEQ tablet Take 1 tablet (10 mEq total) by mouth daily.     triamcinolone (KENALOG) 0.1 % cream Apply topically 2 (two) times a day as needed.     warfarin (COUMADIN) 5 MG tablet Take 1 or 1&1/2 tablets (5mg or 7.5mg) by mouth daily, as directed.   Adjust dose  based on INR results.     Allergies   Allergen Reactions     Oxycodone Nausea And Vomiting     Codeine Nausea And Vomiting     Diazepam Nausea Only     Morphine Nausea And Vomiting     Social History   Substance Use Topics     Smoking status: Never Smoker     Smokeless tobacco: Never Used     Alcohol use No         Time: total time spent with the patient was 40 minutes of which >50% was spent in counseling and coordination of care

## 2021-06-19 NOTE — PROGRESS NOTES
[facility] Follow-up Note    Assessment & Plan        1. Coronary atherosclerosis due to lipid rich plaque -recent angiography 2016 showed normal left main, calcification of the proximal mid left anterior descending but no significant disease, normal circumflex, and normal right coronary artery.  She is now complaining of increased fatigue and increased shortness of breath and minimal activity.  Will perform pharmacological nuclear stress test and if abnormal repeat angiography.  Hemoglobin is normal, BNP was normal a year ago, I suspect this is noncardiac dyspnea.  Could always repeat echo if no abnormality seen on stress test.   2. Essential hypertension with goal blood pressure less than 140/90 -pressure slightly elevated today.  Given her age I am fine with this above 140.  However, she complains of lightheadedness.  Could be that taking all her antihypertensives first thing in the morning is not helpful and I will have her move her losartan to the evening.   3. Other acute pulmonary embolism without acute cor pulmonale (H) -she is on chronic Coumadin per the primary clinic.   4. Hypercholesteremia -cluster elevated at 239 with an LDL of 149 from April 2018.  She is not on statin therapy and if stress test is abnormal would recommend starting.     5. 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  ECG today shows occasional PACs but no alarming arrhythmias.     6.  Prior right basal ganglion CVA-this might be the cause of her chronic feeling of being off kilter.    Plan  1.  Stress test and if abnormal angiography.  2.  Stress test normal echocardiogram.  3.  Follow-up with me one year or sooner if needed.    Subjective  CC: 85-year-old white female being seen in yearly follow-up today.  She comes with numerous concerns.  She tells me she short of  breath, she can barely go up a flight of steps before she has to take some deep breaths, can barely walk around the parking lot of her condo without having to take deep breaths.  She tells me she wakes up at nighttime needed to take a deep breath.  She complains of neuropathic sensation in the bottoms of both feet.  She complains of cramping in her left ankle as well as sciatica her right leg.  She states she is not lightheaded but when she walks she feels off balance.  There is no chest discomfort, palpitations, syncope.    Medications  Current Outpatient Prescriptions   Medication Sig     acetaminophen (TYLENOL) 325 MG tablet Take 2 tablets (650 mg total) by mouth every 4 (four) hours as needed.     amLODIPine (NORVASC) 5 MG tablet Take 1 tablet (5 mg total) by mouth daily.     CALCIUM CARB/MAGNESIUM OXID/D3 (CALCIUM MAGNESIUM + D ORAL) Take by mouth 2 (two) times a day. 750/300/500mg     fluocinonide (LIDEX) 0.05 % cream Apply topically 2 (two) times a day.     GLUCOSAMINE/CHONDROITIN SULF A (GLUCOSAMINE-CHONDROITIN ORAL) Take 2 tablets by mouth daily.     indapamide (LOZOL) 2.5 MG tablet TAKE 1 TABLET EVERY DAY     losartan (COZAAR) 100 MG tablet Take 1 tablet (100 mg total) by mouth daily.     lutein 20 mg cap Take 1 capsule by mouth daily.     multivitamin (ONE A DAY) per tablet Take 1 tablet by mouth daily.     nitroglycerin (NITROSTAT) 0.4 MG SL tablet Place 1 tablet (0.4 mg total) under the tongue as needed for chest pain (Keep in original bottle).     OMEGA-3/DHA/EPA/FISH OIL (FISH OIL-OMEGA-3 FATTY ACIDS) 300-1,000 mg capsule Take 1 g by mouth daily.     omeprazole (PRILOSEC) 20 MG capsule Take 20 mg by mouth daily as needed.      oxybutynin (DITROPAN) 5 MG tablet Take 1 tablet (5 mg total) by mouth at bedtime.     potassium chloride (K-DUR,KLOR-CON) 10 MEQ tablet Take 1 tablet (10 mEq total) by mouth daily.     triamcinolone (KENALOG) 0.1 % cream Apply topically 2 (two) times a day as needed.      "warfarin (COUMADIN) 5 MG tablet Take 1 or 1&1/2 tablets (5mg or 7.5mg) by mouth daily, as directed.   Adjust dose based on INR results.       Objective  /80 (Patient Site: Right Arm, Patient Position: Sitting, Cuff Size: Adult Regular)  Pulse 76  Resp 18  Ht 5' 3\" (1.6 m)  Wt 171 lb (77.6 kg)  BMI 30.29 kg/m2    General Appearance:    Alert, cooperative, no distress, appears stated age   Head:    Normocephalic, without obvious abnormality, atraumatic   Throat:   Lips, mucosa, and tongue normal; teeth and gums normal   Neck:   Supple, symmetrical, trachea midline, no adenopathy;        thyroid:  No enlargement/tenderness/nodules; no carotid    bruit or JVD   Back:     Symmetric, no curvature, ROM normal, no CVA tenderness   Lungs:     Clear to auscultation bilaterally, respirations unlabored   Chest wall:    No tenderness or deformity   Heart:    Regular rate and rhythm, S1 and S2 normal, no murmur, rub   or gallop   Abdomen:     Soft, non-tender, bowel sounds active all four quadrants,     no masses, no organomegaly   Extremities:   Normal, atraumatic, no cyanosis or edema   Pulses:   2+ and symmetric all extremities   Skin:   Skin color, texture, turgor normal, no rashes or lesions     Results    Lab Results personally reviewed   Lab Results   Component Value Date    CHOL 239 (H) 04/11/2018    CHOL 218 (H) 12/19/2017     Lab Results   Component Value Date    HDL 72 04/11/2018    HDL 68 12/19/2017     Lab Results   Component Value Date    LDLCALC 149 (H) 04/11/2018    LDLCALC 134 (H) 12/19/2017     Lab Results   Component Value Date    TRIG 91 04/11/2018    TRIG 80 12/19/2017     Lab Results   Component Value Date    WBC 3.9 (L) 04/11/2018    HGB 13.8 04/11/2018    HCT 41.5 04/11/2018     04/11/2018     Lab Results   Component Value Date    CREATININE 0.80 04/11/2018    BUN 24 04/11/2018     04/11/2018    K 3.5 04/11/2018    CO2 26 04/11/2018     Review of Systems:   General: Weight " Gain  Eyes: WNL  Ears/Nose/Throat: WNL  Lungs: Cough  Heart: Shortness of Breath with activity  Stomach: WNL  Bladder: Frequent Urination at Night  Muscle/Joints: Muscle Weakness  Skin: WNL  Nervous System: Daytime Sleepiness, Dizziness, Loss of Balance  Mental Health: WNL     Blood: WNL

## 2021-06-19 NOTE — LETTER
Letter by Pancho Banks MD at      Author: Pancho Banks MD Service: -- Author Type: --    Filed:  Encounter Date: 9/24/2019 Status: Signed         Jaclyn Gibbs  [address]  [facility] [place] [address]             September 24, 2019         Dear Ms. Gibbs,    Below are the results from your recent visit:    Resulted Orders   Lipid Cascade   Result Value Ref Range    Cholesterol 170 <=199 mg/dL    Triglycerides 87 <=149 mg/dL    HDL Cholesterol 76 >=50 mg/dL    LDL Calculated 77 <=129 mg/dL    Patient Fasting > 8hrs? Yes    HM2(CBC w/o Differential)   Result Value Ref Range    WBC 5.5 4.0 - 11.0 thou/uL    RBC 4.65 3.80 - 5.40 mill/uL    Hemoglobin 13.5 12.0 - 16.0 g/dL    Hematocrit 41.6 35.0 - 47.0 %    MCV 90 80 - 100 fL    MCH 29.1 27.0 - 34.0 pg    MCHC 32.5 32.0 - 36.0 g/dL    RDW 18.0 (H) 11.0 - 14.5 %    Platelets 323 140 - 440 thou/uL    MPV 8.6 7.0 - 10.0 fL   Basic Metabolic Panel   Result Value Ref Range    Sodium 141 136 - 145 mmol/L    Potassium 3.6 3.5 - 5.0 mmol/L    Chloride 102 98 - 107 mmol/L    CO2 27 22 - 31 mmol/L    Anion Gap, Calculation 12 5 - 18 mmol/L    Glucose 83 70 - 125 mg/dL    Calcium 10.0 8.5 - 10.5 mg/dL    BUN 21 8 - 28 mg/dL    Creatinine 0.81 0.60 - 1.10 mg/dL    GFR MDRD Af Amer >60 >60 mL/min/1.73m2    GFR MDRD Non Af Amer >60 >60 mL/min/1.73m2    Narrative    Fasting Glucose reference range is 70-99 mg/dL per  American Diabetes Association (ADA) guidelines.   Hepatic Profile   Result Value Ref Range    Bilirubin, Total 0.5 0.0 - 1.0 mg/dL    Bilirubin, Direct 0.2 <=0.5 mg/dL    Protein, Total 7.1 6.0 - 8.0 g/dL    Albumin 4.1 3.5 - 5.0 g/dL    Alkaline Phosphatase 73 45 - 120 U/L    AST 23 0 - 40 U/L    ALT 18 0 - 45 U/L   Vitamin D, Total (25-Hydroxy)   Result Value Ref Range    Vitamin D, Total (25-Hydroxy) 36.9 30.0 - 80.0 ng/mL    Narrative    Deficiency <10.0 ng/mL  Insufficiency 10.0-29.9 ng/mL  Sufficiency 30.0-80.0  ng/mL  Toxicity (possible) >100.0 ng/mL       Normal all labs specifically your lipids.  Very good!.  Continue same medications.  Thank you.    Please call with questions or contact us using [employer].    Sincerely,        Electronically signed by Pancho Banks MD

## 2021-06-19 NOTE — LETTER
Letter by Vahe Merida DO at      Author: Vahe Merida DO Service: -- Author Type: --    Filed:  Encounter Date: 4/11/2019 Status: (Other)         Jaclyn Gibbs  [address]  [facility] [place] [address]       April 11, 2019     Dear Ms. Gibbs,    Below are the results from your recent visit:    Resulted Orders   XR Cervical Spine 2 - 3 VWS    Narrative    EXAM: XR CERVICAL SPINE 2 - 3 VWS  [facility]  DATE/TIME: 4/4/2019 12:30 PM    INDICATION: Cervicalgia.  COMPARISON: None.    FINDINGS: Lateral view is somewhat degraded by patient motion. Advanced degenerative changes at the C3 through C7 interspaces. 1 mm degenerative anterolisthesis of C2 on C3.              FINDINGS: Lateral view is somewhat degraded by patient motion. Advanced degenerative changes at the C3 through C7 interspaces. 1 mm degenerative anterolisthesis of C2 on C3.     Degenerative findings mentioned are consistent with having signs of advanced Osteoarthritis. Osteoarthritis represents gradual wearing down with time of the protective cartilage in a joint.    Anterolisthesis represents forward slippage of the upper vertebral body in relation to the vertebra below.  Above findings are on the very mild side, recommend monitoring with time.    Sincerely,      Electronically signed by Vahe Merida DO

## 2021-06-19 NOTE — PROGRESS NOTES
"Office Visit - Follow up    Jaclyn Gibbs   85 y.o. female    Date of Visit: 8/20/2018    Chief Complaint   Patient presents with     Pre-op Exam     Angiogram on 8/23 at [facility] by Dr Cardona       Subjective: Pleasant patient of Dr. Pancho Banks seen for \"preop\" evaluation prior to coronary arteriography performed at the order of Dr. Chago Cardona.  See his notes and results of nuclear stress test would suggest reversible ischemia.  History of DVT of lower extremities on warfarin the warfarin has been held per instructions by the cardiology team.  Does not have any symptoms of angina but does have significant exertional dyspnea which is a change.  Known coronary disease with previous coronary anatomy identified by Dr. Cardona    Additional comorbidity includes gastroesophageal reflux and hyperlipidemia as well as spinal stenosis and degenerative arthritis    Current medications reviewed discussed and reconciled no recent changes    Allergies as noted in chart these are reviewed    Generally she is feeling well however is concerned of her exertional dyspnea    ROS: A comprehensive review of systems was performed and was otherwise negative except as mentioned above.     Exam  Alert and oriented mildly apprehensive head and neck unremarkable EENT negative lungs clear heart normal no murmur or gallop abdomen negative extremities normal skin and lymphatics negative    Preoperative EKG unremarkable    In view of exertional dyspnea I have proceeded with routine chest x-ray this is personally reviewed and unremarkable    Labs pending at this time these will be reviewed   /60  Pulse (!) 54  Ht 5' 3\" (1.6 m)  Wt 170 lb (77.1 kg)  BMI 30.11 kg/m2    Assessment and Plan  Stable preprocedure exam no contraindications to planned procedure    Jaclyn was seen today for pre-op exam.    Diagnoses and all orders for this visit:    Preop general physical exam  -     Electrocardiogram Perform and Read  -     " HM2(CBC w/o Differential); Future  -     Basic Metabolic Panel  -     XR Chest 2 Views; Standing  -     HM2(CBC w/o Differential)    Abnormal nuclear stress test  -     HM2(CBC w/o Differential); Future  -     Basic Metabolic Panel  -     XR Chest 2 Views; Standing  -     HM2(CBC w/o Differential)    Acute thromboembolism of deep veins of lower extremity (H)  -     HM2(CBC w/o Differential); Future  -     Basic Metabolic Panel  -     XR Chest 2 Views; Standing  -     HM2(CBC w/o Differential)    Gastroesophageal reflux disease without esophagitis  -     HM2(CBC w/o Differential); Future  -     Basic Metabolic Panel  -     XR Chest 2 Views; Standing  -     HM2(CBC w/o Differential)    Hypercholesteremia  -     HM2(CBC w/o Differential); Future  -     Basic Metabolic Panel  -     XR Chest 2 Views; Standing  -     HM2(CBC w/o Differential)    Spinal stenosis of lumbar region with radiculopathy, L5 to S1  -     HM2(CBC w/o Differential); Future  -     Basic Metabolic Panel  -     XR Chest 2 Views; Standing  -     HM2(CBC w/o Differential)    Atherosclerosis of native coronary artery of native heart without angina pectoris  -     HM2(CBC w/o Differential); Future  -     Basic Metabolic Panel  -     XR Chest 2 Views; Standing  -     HM2(CBC w/o Differential)    NIELSON (dyspnea on exertion)  -     HM2(CBC w/o Differential); Future  -     Basic Metabolic Panel  -     XR Chest 2 Views; Standing  -     HM2(CBC w/o Differential)          Time: total time spent with the patient was 45 minutes of which >50% was spent in counseling and coordination of care        Allergies   Allergen Reactions     Oxycodone Nausea And Vomiting     Codeine Nausea And Vomiting     Diazepam Nausea Only     Morphine Nausea And Vomiting       Medications :  Prior to Admission medications    Medication Sig Start Date End Date Taking? Authorizing Provider   acetaminophen (TYLENOL) 325 MG tablet Take 2 tablets (650 mg total) by mouth every 4 (four) hours as  needed. 8/11/17  Yes Eugene Queen MD   amLODIPine (NORVASC) 5 MG tablet Take 1 tablet (5 mg total) by mouth daily. 1/30/18  Yes Pancho Banks MD   CALCIUM CARB/MAGNESIUM OXID/D3 (CALCIUM MAGNESIUM + D ORAL) Take by mouth 2 (two) times a day. 750/300/500mg   Yes PROVIDER, HISTORICAL   fluocinonide (LIDEX) 0.05 % cream Apply topically 2 (two) times a day. 9/18/17  Yes Pancho Banks MD   GLUCOSAMINE/CHONDROITIN SULF A (GLUCOSAMINE-CHONDROITIN ORAL) Take 2 tablets by mouth daily.   Yes Pancho Banks MD   indapamide (LOZOL) 2.5 MG tablet TAKE 1 TABLET EVERY DAY 1/30/18  Yes Pancho Banks MD   losartan (COZAAR) 100 MG tablet Take 1 tablet (100 mg total) by mouth daily. 1/30/18  Yes Pancho Banks MD   lutein 20 mg cap Take 1 capsule by mouth daily.   Yes PROVIDER, HISTORICAL   multivitamin (ONE A DAY) per tablet Take 1 tablet by mouth daily.   Yes PROVIDER, HISTORICAL   nitroglycerin (NITROSTAT) 0.4 MG SL tablet Place 1 tablet (0.4 mg total) under the tongue as needed for chest pain (Keep in original bottle). 5/3/17  Yes Stacey Cardona MD   OMEGA-3/DHA/EPA/FISH OIL (FISH OIL-OMEGA-3 FATTY ACIDS) 300-1,000 mg capsule Take 1 g by mouth daily.   Yes PROVIDER, HISTORICAL   omeprazole (PRILOSEC) 20 MG capsule Take 20 mg by mouth daily as needed.    Yes Pancho Banks MD   oxybutynin (DITROPAN) 5 MG tablet Take 1 tablet (5 mg total) by mouth at bedtime. 12/19/17  Yes Pancho Banks MD   potassium chloride (K-DUR,KLOR-CON) 10 MEQ tablet Take 1 tablet (10 mEq total) by mouth daily. 5/29/18  Yes Pancho Banks MD   triamcinolone (KENALOG) 0.1 % cream Apply topically 2 (two) times a day as needed. 5/23/17   Pancho Banks MD   warfarin (COUMADIN) 5 MG tablet Take 1 or 1&1/2 tablets (5mg or 7.5mg) by mouth daily, as directed.   Adjust dose based on INR results. 12/26/17 7/31/18  Pancho Banks MD        Past Medical History:   Past Medical History:   Diagnosis Date     Acute  pancreatitis 2013     Basal cell carcinoma      Carpal tunnel syndrome      Coronary artery disease      CVA (cerebral vascular accident) (H) 2009    Posterior limb right internal capsule ischemic stroke.     Hyperlipidemia      Hypertension      Lumbar spondylosis      Osteoarthritis      Osteopenia 2016     Overactive bladder      Paroxysmal supraventricular tachycardia (H)      Peroneal DVT (deep venous thrombosis), left (H) 2008    after left knee replacement. Was on warfarin for some time.     Pulmonary embolism on right (H) 2017     Scoliosis      Vertigo        Past Surgical History:   Past Surgical History:   Procedure Laterality Date     APPENDECTOMY       BASAL CELL CARCINOMA EXCISION      upper back and left chin     CARDIAC CATHETERIZATION       CATARACT EXTRACTION Bilateral       SECTION  1970     CORONARY ANGIOPLASTY  2010    stent placed to the first diagonal     LUMBAR LAMINECTOMY      L4-L5.     MA ABLATE HEART DYSRHYTHM FOCUS  2014    Description: Catheter Ablation Atrial Supraventricular Tachycardia;  Comments: typical AVNRT,  successful cryoablation     TOTAL KNEE ARTHROPLASTY Right 2003     TOTAL KNEE ARTHROPLASTY Left 2008       Social History:   Social History     Social History     Marital status:      Spouse name: N/A     Number of children: 6     Years of education: N/A     Occupational History     Retired.      [employer] before she retired.Retired at 72.     Social History Main Topics     Smoking status: Never Smoker     Smokeless tobacco: Never Used     Alcohol use No     Drug use: No     Sexual activity: Not on file     Other Topics Concern     Not on file     Social History Narrative    , 6 children, active, is a full code.  (last updated 2017)        Family History:   Family History   Problem Relation Age of Onset     Stroke Mother 88     Hypertension Father      Sudden death Father 70     suspected heart  attack      Breast cancer Sister      Venous thrombosis Sister 80     At age 80, and had breast CA     Cirrhosis Sister 34     Aneurysm Sister      Other Brother 25     Killed in WWII.     Brain cancer Daughter 5     No Medical Problems Daughter      No Medical Problems Son      No Medical Problems Son      No Medical Problems Son      Dementia Sister          Arnulfo Wolff MD

## 2021-06-19 NOTE — LETTER
Letter by Pancho Banks MD at      Author: Pancho Banks MD Service: -- Author Type: --    Filed:  Encounter Date: 4/22/2019 Status: (Other)         Jaclyn Gibbs  [address]  [facility] [address]             April 22, 2019         Dear Ms. Gibbs,    Below are the results from your recent visit:    Resulted Orders   HM2(CBC w/o Differential)   Result Value Ref Range    WBC 3.4 (L) 4.0 - 11.0 thou/uL    RBC 3.92 3.80 - 5.40 mill/uL    Hemoglobin 10.2 (L) 12.0 - 16.0 g/dL    Hematocrit 30.8 (L) 35.0 - 47.0 %    MCV 79 (L) 80 - 100 fL    MCH 25.9 (L) 27.0 - 34.0 pg    MCHC 33.0 32.0 - 36.0 g/dL    RDW 12.7 11.0 - 14.5 %    Platelets 328 140 - 440 thou/uL    MPV 7.2 7.0 - 10.0 fL   Lipid Cascade   Result Value Ref Range    Cholesterol 150 <=199 mg/dL    Triglycerides 66 <=149 mg/dL    HDL Cholesterol 72 >=50 mg/dL    LDL Calculated 65 <=129 mg/dL    Patient Fasting > 8hrs? Yes    Thyroid Stimulating Hormone (TSH)   Result Value Ref Range    TSH 3.62 0.30 - 5.00 uIU/mL   Basic Metabolic Panel   Result Value Ref Range    Sodium 139 136 - 145 mmol/L    Potassium 3.3 (L) 3.5 - 5.0 mmol/L    Chloride 102 98 - 107 mmol/L    CO2 28 22 - 31 mmol/L    Anion Gap, Calculation 9 5 - 18 mmol/L    Glucose 106 70 - 125 mg/dL    Calcium 9.5 8.5 - 10.5 mg/dL    BUN 26 8 - 28 mg/dL    Creatinine 0.90 0.60 - 1.10 mg/dL    GFR MDRD Af Amer >60 >60 mL/min/1.73m2    GFR MDRD Non Af Amer 59 (L) >60 mL/min/1.73m2    Narrative    Fasting Glucose reference range is 70-99 mg/dL per  American Diabetes Association (ADA) guidelines.   Hepatic Profile   Result Value Ref Range    Bilirubin, Total 0.4 0.0 - 1.0 mg/dL    Bilirubin, Direct 0.2 <=0.5 mg/dL    Protein, Total 7.1 6.0 - 8.0 g/dL    Albumin 3.7 3.5 - 5.0 g/dL    Alkaline Phosphatase 60 45 - 120 U/L    AST 25 0 - 40 U/L    ALT 18 0 - 45 U/L       Decreased potassium. Increase potassium you are taking to twice a day.     Decreased hemoglobin,  hematocrit and white cell count. Please start iron supplement 3 times a day. Ferrous sulfate, an iron supplement was sent to your pharmacy.     Will repeat your potassium and blood count on your follow up in a month.    Normal rest of your labs specifically your lipids. Thanks.     Please call with questions or contact us using [employer].    Sincerely,        Electronically signed by Pancho Banks MD

## 2021-06-20 NOTE — LETTER
Letter by Pancho Banks MD at      Author: Pancho Banks MD Service: -- Author Type: --    Filed:  Encounter Date: 1/28/2020 Status: (Other)         Jaclyn Gibbs  [address]  [facility] [address]             January 28, 2020         Dear Ms. Gibbs,    Below are the results from your recent visit:    Resulted Orders   HM2(CBC w/o Differential)   Result Value Ref Range    WBC 4.1 4.0 - 11.0 thou/uL    RBC 4.33 3.80 - 5.40 mill/uL    Hemoglobin 12.6 12.0 - 16.0 g/dL    Hematocrit 37.8 35.0 - 47.0 %    MCV 87 80 - 100 fL    MCH 29.2 27.0 - 34.0 pg    MCHC 33.4 32.0 - 36.0 g/dL    RDW 12.8 11.0 - 14.5 %    Platelets 356 140 - 440 thou/uL    MPV 7.2 7.0 - 10.0 fL   Lipid Cascade   Result Value Ref Range    Cholesterol 240 (H) <=199 mg/dL    Triglycerides 114 <=149 mg/dL    HDL Cholesterol 72 >=50 mg/dL    LDL Calculated 145 (H) <=129 mg/dL    Patient Fasting > 8hrs? Yes    Thyroid Stimulating Hormone (TSH)   Result Value Ref Range    TSH 4.04 0.30 - 5.00 uIU/mL   Basic Metabolic Panel   Result Value Ref Range    Sodium 141 136 - 145 mmol/L    Potassium 3.6 3.5 - 5.0 mmol/L    Chloride 103 98 - 107 mmol/L    CO2 27 22 - 31 mmol/L    Anion Gap, Calculation 11 5 - 18 mmol/L    Glucose 90 70 - 125 mg/dL    Calcium 9.5 8.5 - 10.5 mg/dL    BUN 21 8 - 28 mg/dL    Creatinine 0.89 0.60 - 1.10 mg/dL    GFR MDRD Af Amer >60 >60 mL/min/1.73m2    GFR MDRD Non Af Amer 60 (L) >60 mL/min/1.73m2    Narrative    Fasting Glucose reference range is 70-99 mg/dL per  American Diabetes Association (ADA) guidelines.   Hepatic Profile   Result Value Ref Range    Bilirubin, Total 0.5 0.0 - 1.0 mg/dL    Bilirubin, Direct 0.2 <=0.5 mg/dL    Protein, Total 7.2 6.0 - 8.0 g/dL    Albumin 3.6 3.5 - 5.0 g/dL    Alkaline Phosphatase 64 45 - 120 U/L    AST 21 0 - 40 U/L    ALT 15 0 - 45 U/L   Vitamin D, Total (25-Hydroxy)   Result Value Ref Range    Vitamin D, Total (25-Hydroxy) 29.9 (L) 30.0 - 80.0 ng/mL     Narrative    Deficiency <10.0 ng/mL  Insufficiency 10.0-29.9 ng/mL  Sufficiency 30.0-80.0 ng/mL  Toxicity (possible) >100.0 ng/mL       Now you  have increased lipids specifically your LDL and total cholesterol. You do not not need medication yet. But stay away from fatty or high cholesterol diet.  We will repeat your fasting lipids next visit.  Rest of your labs are normal.  Continue all your other medications.  Thank you.    Please call with questions or contact us using [employer].    Sincerely,        Electronically signed by Pancho Banks MD

## 2021-06-20 NOTE — PROGRESS NOTES
ITP ASSESSMENT   Assessment Day: Initial  Session Number: 1  Precautions: Standard, CAD  Diagnosis: Stent  Risk Stratification: High  Referring Provider: Christopher Mendes,*  EXERCISE  Exercise Assessment: Initial     6 Minute Walk Test   Pre   Pre Exercise HR: 85                  Pre Exercise BP: 120/82    Peak  Peak HR: 108                 Peak BP: 140/80  Peak feet: 1075  Peak O2 SAT: 98  Peak RPE: 13  Peak MPH: 2.04    Symptoms:  Peak Symptoms: SOB    5 mins. Post  5 Min Post HR: 80  5 Min Post BP: 126/78                         Exercise Plan  Goals Next 30 days  ADL'S: Resume 1 flight of stairs w/o SOB  Leisure: Resume walking 5-10 min 2x daily, 2-3x/week  Work: Retired    Education Goals: Has system for taking medication.;Patient can state cardiac s/s and appropriate emergency response.  Education Goals Met: Medication review.    Exercise Prescription  Exercise Mode: Treadmill;Bike;Nustep;Arm Erg.;Stairs  Frequency: 2x/week  Duration: 30-40 min  Intensity / THR: 20-30 beats above resting heart rate  RPE 11-14  Progression / Met level: 2.5-3.5  Resistive Training?: Yes    Current Exercise (mins/week): 60    Interventions  Home Exercise:  Mode: walking  Frequency: 2-3x/week  Duration: 5-10 min 2x/day    Education Material : Provide written material;Individual education and counseling;Offer educational classes    Education Completed  Exercise Education Completed: Cardiac Anatomy;Signs and Symptoms;Medication review;RPE;Emergency Plan;Home Exercise;Warm up/cool down;FITT Principles;BP/HR Reponse to exercise;Strength training;Benefits of Exercise;End point of exercise            Exercise Follow-up/Discharge  Follow up/Discharge: Pt encouraged to walk at home 2-3x/week, 5-10 min twice a day. NUTRITION  Nutrition Assessment: Initial    Nutrition Risk Factors:  Nutrition Risk Factors: Dyslipidemia;Overweight  Cholesterol: 200  LDL: 120  HDL: 61  Triglycerides: 93    Nutrition Plan  Interventions  Other Nutrition  "Intervention: Therapist/Pt Discussion;Provide with Written Material    Education Completed  Nutrition Education Completed: Risk factor overview    Goals  Nutrition Goals (Next 30 days): Patient will follow a low sodium diet;Patient will follow a low saturated fat diet;Patient will lose weight    Goals Met  Nutrition Goals Met: Completed Nutritional Risk Screen;Provided Rate your Plate Survey;Reviewed Dietitian schedule    Height, Weight, and  BMI  Weight: 169 lb (76.7 kg)  Height: 5' 2\" (1.575 m)  BMI: 30.9    Nutrition Follow-up  Follow-up/Discharge: Pt encouraged to follow a low fat and low sodium diet. Also encouraged to meet with the dietician.       Other Risk Factors  Other Risk Factor Assessment: Initial    HTN Risk Factor: Hypertension    Pre Exercise BP: 120/82  Post Exercise BP: 126/78    Hypertension Plan  Goals  HTN Goals: Follow low sodium diet;Exercises regularly    Goals Met  HTN Goals Met: Take medication as prescribed    HTN Interventions  HTN Interventions: Therapist/patient discussion;Offer educational classes    HTN Education Completed  HTN Education Completed: Risk factor overview;Medication review;Low sodium diet    Tobacco Risk Factor: NA    Risk Factor Follow-up   Follow-up/Discharge: Pt encouraged to follow low sodium diet.   PSYCHOSOCIAL  Psychosocial Assessment: Initial     [place] COOP Q of L Summary Score: 22    DELILAH-D Score: 18 (Pt's answer are not consistant)    Psychosocial Risk Factor: NA    Psychosocial Plan  Interventions  If DELILAH-D > 15 send letter to MD  Interventions: Offer educational videos and classes    Education Completed  Education Completed: Relaxation/Coping Techniques    Goals  Goals (Next 30 days): Oriented to stress management classes;Improvement in [place] CO[employer] score;Practicing stress management skills    Goals Met  Goals Met: Identified Support system;Identify stressors    Psychosocial Follow-up  Follow-up/Discharge: Pt denies major stress. Enjoys reading to relax "     Patient involved in Goal setting?: Yes    Signature: _____________________________________________________________    Date: __________________    Time: __________________

## 2021-06-20 NOTE — PROGRESS NOTES
[facility] Heart Care Note    Assessment/Plan:    Jaclyn Gibbs is a 85 y.o. old female with:  1. Dyspnea - obtain baseline labs Hemogram, d-dimer, CPK, BMP, BNP, INR.  ECG NSR with LBBB and PAC, first degree AV block.  Consider CTA/VQ scan to screen for PE or stress nuclear depending on above tests.        Dr. Christopher Mendes  [facility] HEART CARE  [phone]  ______________________________________________________________________    Subjective:    I had the opportunity to see Jaclyn Gibbs at the [facility]. Jaclyn Gibbs is a 85 y.o. female with a known history of CAD s/p PCI 8/23 to diagonal, with increase in dyspnea on exertion, no chagne in left lower leg edema, no PND/orthopnea (sleeps really good except for nocturia - no change in volume or frequency), no chest pain/pressure, palpitations, syncopal episodes.  When walking feels more dizzy since PCI.  No GI/ bleeding.  Takes more naps.  Feels legs are not strong but this is chronic problem, started on atorvastatin on last admission.    ______________________________________________________________________      Review of Systems:   General: WNL  Eyes: WNL  Ears/Nose/Throat: WNL  Lungs: Cough, Shortness of Breath  Heart: Shortness of Breath with activity  Stomach: WNL  Bladder: Frequent Urination at Night  Muscle/Joints: Muscle Weakness  Skin: WNL  Nervous System: Daytime Sleepiness, Dizziness, Loss of Balance  Mental Health: WNL     Blood: Easy Bruising    Problem List:  Patient Active Problem List   Diagnosis     DVT left leg 2008 after TKA     Benign Pigmented Nevus     Seborrheic Keratosis     Basal Cell Carcinoma Of The Skin Of The Face     Lacunar Stroke     Obesity     Osteopenia     Essential hypertension with goal blood pressure less than 140/90     Coronary Artery Disease     Paroxysmal Supraventricular Tachycardia     Palpitations     Hypercholesteremia     Lower Back Pain     Spinal stenosis of lumbar  region with radiculopathy, L5 to S1     Weakness of both lower extremities     Back pain     GERD (gastroesophageal reflux disease)     Insomnia, unspecified type     Lower extremity weakness     Scoliosis of thoracolumbar spine     Left lumbar radiculopathy     Pulmonary embolism (H)     Other acute pulmonary embolism without acute cor pulmonale (H)     Abnormal nuclear stress test     Medical History:  Past Medical History:   Diagnosis Date     Acute pancreatitis 2013     Basal cell carcinoma      Carpal tunnel syndrome      Coronary artery disease      CVA (cerebral vascular accident) (H) 2009    Posterior limb right internal capsule ischemic stroke.     GERD (gastroesophageal reflux disease)      Hyperlipidemia      Hypertension      Lumbar spondylosis      Osteoarthritis      Osteopenia 2016     Overactive bladder      Paroxysmal supraventricular tachycardia (H)      Peroneal DVT (deep venous thrombosis), left (H) 2008    after left knee replacement. Was on warfarin for some time.     Pulmonary embolism on right (H) 2017     Scoliosis      Vertigo      Surgical History:  Past Surgical History:   Procedure Laterality Date     APPENDECTOMY       BASAL CELL CARCINOMA EXCISION      upper back and left chin     CARDIAC CATHETERIZATION       CATARACT EXTRACTION Bilateral       SECTION  1970     CORONARY ANGIOPLASTY  2010    stent placed to the first diagonal     CV CORONARY ANGIOGRAM N/A 2018    Procedure: Coronary Angiogram;  Surgeon: Christopher Mendes MD;  Location: [facility] Cath Lab;  Service:      CV LEFT HEART CATHETERIZATION WO LEFT VETRICULOGRAM Left 2018    Procedure: Left Heart Catheterization Without Left Ventriculogram;  Surgeon: Christopher Mendes MD;  Location: [facility] Cath Lab;  Service:      LUMBAR LAMINECTOMY  2009    L4-L5.     TX ABLATE HEART DYSRHYTHM FOCUS  2014    Description: Catheter Ablation Atrial Supraventricular  Tachycardia;  Comments: typical AVNRT,  successful cryoablation     TOTAL KNEE ARTHROPLASTY Right 01/23/2003     TOTAL KNEE ARTHROPLASTY Left 11/05/2008     Social History:  No pertinent social hx related to patient's chief complaint other than above in subjective        Family History:  No pertinent family hx related to patient's chief complaint other than above in subjective      Allergies:  Allergies   Allergen Reactions     Oxycodone Nausea And Vomiting     Codeine Nausea And Vomiting     Diazepam Nausea Only     Morphine Nausea And Vomiting       Medications:  Current Outpatient Prescriptions   Medication Sig Dispense Refill     amLODIPine (NORVASC) 5 MG tablet Take 1 tablet (5 mg total) by mouth daily. 90 tablet 3     atorvastatin (LIPITOR) 40 MG tablet Take 1 tablet (40 mg total) by mouth at bedtime. 90 tablet 5     CALCIUM CARB/MAGNESIUM OXID/D3 (CALCIUM MAGNESIUM + D ORAL) Take by mouth 2 (two) times a day. 750/300/500mg       clopidogrel (PLAVIX) 75 mg tablet Take 1 tablet (75 mg total) by mouth daily. 90 tablet 5     GLUCOSAMINE/CHONDROITIN SULF A (GLUCOSAMINE-CHONDROITIN ORAL) Take 2 tablets by mouth daily.       indapamide (LOZOL) 2.5 MG tablet TAKE 1 TABLET EVERY DAY 90 tablet 3     losartan (COZAAR) 100 MG tablet Take 1 tablet (100 mg total) by mouth daily. 90 tablet 3     lutein 20 mg cap Take 1 capsule by mouth daily.       multivitamin (ONE A DAY) per tablet Take 1 tablet by mouth daily.       naproxen sodium (ALEVE) 220 MG tablet Take 220 mg by mouth 2 (two) times a day with meals.       potassium chloride (K-DUR,KLOR-CON) 10 MEQ tablet Take 1 tablet (10 mEq total) by mouth daily. 90 tablet 3     triamcinolone (KENALOG) 0.1 % cream Apply topically 2 (two) times a day as needed. 30 g 3     warfarin (COUMADIN) 5 MG tablet Take 5 mg by mouth daily. Adjust dose based on INR results as directed.       fluocinonide (LIDEX) 0.05 % cream Apply topically 2 (two) times a day. (Patient taking differently:  "Apply topically 2 (two) times a day as needed. ) 30 g 1     nitroglycerin (NITROSTAT) 0.4 MG SL tablet Place 1 tablet (0.4 mg total) under the tongue as needed for chest pain (Keep in original bottle). 45 tablet 6     oxybutynin (DITROPAN) 5 MG tablet Take 1 tablet (5 mg total) by mouth at bedtime. (Patient taking differently: Take 5 mg by mouth at bedtime as needed. ) 90 tablet 3     No current facility-administered medications for this visit.        Objective:   Vital signs:  /70 (Patient Site: Left Arm, Patient Position: Sitting, Cuff Size: Adult Regular)  Pulse 60  Resp 14  Ht 5' 3\" (1.6 m)  Wt 170 lb (77.1 kg)  BMI 30.11 kg/m2    Walked in the shipman baseline sat 95%, HR 85, walked 100 feet with increase in dyspnea sat 97% and  peak - then obtained ECG.     Physical Exam:    GENERAL APPEARANCE: Alert, cooperative and in no acute distress.  HEENT: No scleral icterus. No Xanthelasma. Oral mucuos membranes pink and moist.  NECK: JVP<6cm. No Hepatojugular reflux. Thyroid not visualized  CHEST: clear to auscultation  CARDIOVASCULAR: S1, S2 without murmur ,clicks or rubs. Brachial, radial and posterior tibial pulses are intact and symetric. No carotid bruits noted.    ABDOMEN: Nontender. BS+. No bruits.  EXTREMITIES: No cyanosis, clubbing or edema.    Lab Results:  LIPIDS:  Lab Results   Component Value Date    CHOL 200 (H) 08/23/2018    CHOL 239 (H) 04/11/2018    CHOL 218 (H) 12/19/2017     Lab Results   Component Value Date    HDL 61 08/23/2018    HDL 72 04/11/2018    HDL 68 12/19/2017     Lab Results   Component Value Date    LDLCALC 120 08/23/2018    LDLCALC 149 (H) 04/11/2018    LDLCALC 134 (H) 12/19/2017     Lab Results   Component Value Date    TRIG 93 08/23/2018    TRIG 91 04/11/2018    TRIG 80 12/19/2017     No components found for: CHOLHDL    BMP:  Lab Results   Component Value Date    CREATININE 0.92 08/23/2018    BUN 27 08/23/2018     08/23/2018    K 3.6 08/23/2018     " 08/23/2018    CO2 26 08/23/2018         Christopher Mendes MD  [facility]  [phone]

## 2021-06-20 NOTE — PROGRESS NOTES
Office Visit - Follow Up   Jaclyn Gibbs   86 y.o. female    Date of Visit: 9/17/2018    Chief Complaint   Patient presents with     Follow-up     fasting labs-INR-pt had an angiogram at [facility]         Assessment and Plan   1. Coronary Artery Disease  S/P stent, first diagonal, 8/23/18  Due to her recurring dyspnea, nuclear stress test was done on 7/31/2018 which showed moderate to severe ischemia involving the anteroseptal and anterior wall but has normal ejection fraction.  Underwent coronary angiography with PCI /stent placement the first diagonal on 8/20/2018 by Dr. Mendes. But even after he had her stent she continues to have shortness of breath and dyspnea on exertion.  Started cardiac rehab cardiac rehab and seems to be tolerating.  Notes ofincreased bruising on her skin due to her Plavix and warfarin.  Advised to discuss with Dr. Mendes when she sees him  about the use of double blood thinners.  But she needs her Plavix because of her recent stent placement to prevent restenosis.    2. Pulmonary embolism (H)  Takes warfarin for her PE.  Was seen by hematology and was advised to a take warfarin lifelong because this was unprovoked PE unless there is contraindication to take warfarin.  Due for INR today.  Check INR.  - INR    3. Essential hypertension with goal blood pressure less than 140/90  Controlled.  Continue amlodipine, indapamide, losartan.  Check CBC and basic metabolic panel.  - HM2(CBC w/o Differential)  - Basic Metabolic Panel    4. Hypercholesteremia  Controlled.  Lipids on  8/20/2018 were good , HDL 61, triglycerides 93 and total cholesterol 200.  Check lipids and liver function.  - Lipid Cascade  - Hepatic Profile    Reviewed her nuclear stress test, coronary angiogram PCI with stent placement, cardiology notes and all her other labs.      Follow up in 3 months.     History of Present Illness   This 86 y.o. old female is here for post PCI, stent placement on 8/20/2018.   Complains of dyspnea.  Had nuclear stress test in July.  Showed moderate to severe ischemia.  Underwent coronary angiogram on 8/23/2018 found to have stenosis the first diagonal  and this was successfully stented. Clopidogrel was started post stent.  Saw Dr. Mendes for follow-up on 9/4/2018.  Still complains of exertional dyspnea.  Was worked up with normal d-dimer, BNP, BMP and CBC.  Scheduled to have a repeat nuclear stress test because of her continuing dyspnea.  However she is tolerating her cardiac rehab after her stent placement to times a week.  Does not have chest pains nor shortness of breath doing her monitored cardiac rehab exercise.  Has PE.  On warfarin indefinitely.  Advised by hematology to take warfarin for life unless there is contraindication.  Complains of increased bruising due to Plavix and warfarin.  Has hypertension and hyperlipidemia controlled by her medications.  No other complaints.    Review of Systems   A 12 point comprehensive review of systems was negative except as noted..     Medications, Allergies and Problem List   Reviewed and updated             Chief Complaint   Follow-up (fasting labs-INR-pt had an angiogram at [facility] )       Patient Profile   Social History     Social History Narrative    , 6 children, active, is a full code.  (last updated 8/9/2017)         Past Medical History   Patient Active Problem List   Diagnosis     DVT left leg 2008 after TKA     Benign Pigmented Nevus     Seborrheic Keratosis     Basal Cell Carcinoma Of The Skin Of The Face     Lacunar Stroke     Obesity     Osteopenia     Essential hypertension with goal blood pressure less than 140/90     Coronary Artery Disease     Paroxysmal Supraventricular Tachycardia     Palpitations     Hypercholesteremia     Lower Back Pain     Spinal stenosis of lumbar region with radiculopathy, L5 to S1     Weakness of both lower extremities     Back pain     GERD (gastroesophageal reflux disease)     Insomnia,  unspecified type     Lower extremity weakness     Scoliosis of thoracolumbar spine     Left lumbar radiculopathy     Pulmonary embolism (H)     Other acute pulmonary embolism without acute cor pulmonale (H)     Abnormal nuclear stress test       Past Surgical History  She has a past surgical history that includes Appendectomy;  section (); pr ablate heart dysrhythm focus (2014); Cardiac catheterization; Coronary angioplasty (2010); Cataract extraction (Bilateral); Excision basal cell carcinoma; Lumbar laminectomy (); Total knee arthroplasty (Right, 2003); Total knee arthroplasty (Left, 2008); Coronary Angiogram (N/A, 2018); and Left Heart Catheterization Without Left Ventriculogram (Left, 2018).       Medications and Allergies   Current Outpatient Prescriptions   Medication Sig     amLODIPine (NORVASC) 5 MG tablet Take 1 tablet (5 mg total) by mouth daily.     atorvastatin (LIPITOR) 40 MG tablet Take 1 tablet (40 mg total) by mouth at bedtime.     CALCIUM CARB/MAGNESIUM OXID/D3 (CALCIUM MAGNESIUM + D ORAL) Take by mouth 2 (two) times a day. 750/300/500mg     clopidogrel (PLAVIX) 75 mg tablet Take 1 tablet (75 mg total) by mouth daily.     fluocinonide (LIDEX) 0.05 % cream Apply topically 2 (two) times a day. (Patient taking differently: Apply topically 2 (two) times a day as needed. )     GLUCOSAMINE/CHONDROITIN SULF A (GLUCOSAMINE-CHONDROITIN ORAL) Take 2 tablets by mouth daily.     indapamide (LOZOL) 2.5 MG tablet TAKE 1 TABLET EVERY DAY     losartan (COZAAR) 100 MG tablet Take 1 tablet (100 mg total) by mouth daily.     lutein 20 mg cap Take 1 capsule by mouth daily.     multivitamin (ONE A DAY) per tablet Take 1 tablet by mouth daily.     nitroglycerin (NITROSTAT) 0.4 MG SL tablet Place 1 tablet (0.4 mg total) under the tongue as needed for chest pain (Keep in original bottle).     oxybutynin (DITROPAN) 5 MG tablet Take 1 tablet (5 mg total) by mouth at bedtime.  "(Patient taking differently: Take 5 mg by mouth at bedtime as needed. )     potassium chloride (K-DUR,KLOR-CON) 10 MEQ tablet Take 1 tablet (10 mEq total) by mouth daily.     triamcinolone (KENALOG) 0.1 % cream Apply topically 2 (two) times a day as needed.     warfarin (COUMADIN) 5 MG tablet Take 5 mg by mouth daily. Adjust dose based on INR results as directed.     Allergies   Allergen Reactions     Oxycodone Nausea And Vomiting     Codeine Nausea And Vomiting     Diazepam Nausea Only     Morphine Nausea And Vomiting        Family and Social History   Family History   Problem Relation Age of Onset     Stroke Mother 88     Hypertension Father      Sudden death Father 70     suspected heart attack      Breast cancer Sister      Venous thrombosis Sister 80     At age 80, and had breast CA     Cirrhosis Sister 34     Aneurysm Sister      Other Brother 25     Killed in WWII.     Brain cancer Daughter 5     No Medical Problems Daughter      No Medical Problems Son      No Medical Problems Son      No Medical Problems Son      Dementia Sister         Social History   Substance Use Topics     Smoking status: Never Smoker     Smokeless tobacco: Never Used     Alcohol use No        Physical Exam       Physical Exam  /72 (Patient Site: Left Arm, Patient Position: Sitting, Cuff Size: Adult Regular)  Pulse 80  Ht 5' 3\" (1.6 m)  Wt 169 lb (76.7 kg)  BMI 29.94 kg/m2  General appearance: alert, appears stated age, cooperative, no distress and tired looking  Head: Normocephalic, without obvious abnormality, atraumatic  Throat: lips, mucosa, and tongue normal; teeth and gums normal  Neck: no adenopathy, no carotid bruit, no JVD, supple, symmetrical, trachea midline and thyroid not enlarged, symmetric, no tenderness/mass/nodules  Lungs: clear to auscultation bilaterally  Heart: regular rate and rhythm, S1, S2 normal, no murmur, click, rub or gallop  Abdomen: soft, non-tender; bowel sounds normal; no masses,  no " organomegaly  Extremities: extremities normal, atraumatic, no cyanosis or edema  Skin: Skin color, texture, turgor normal. No rashes or lesions  Neurologic: Grossly normal     Additional Information        Pancho Banks MD  Internal Medicine  Contact me at [phone]     Additional Information   Current Outpatient Prescriptions   Medication Sig     amLODIPine (NORVASC) 5 MG tablet Take 1 tablet (5 mg total) by mouth daily.     atorvastatin (LIPITOR) 40 MG tablet Take 1 tablet (40 mg total) by mouth at bedtime.     CALCIUM CARB/MAGNESIUM OXID/D3 (CALCIUM MAGNESIUM + D ORAL) Take by mouth 2 (two) times a day. 750/300/500mg     clopidogrel (PLAVIX) 75 mg tablet Take 1 tablet (75 mg total) by mouth daily.     fluocinonide (LIDEX) 0.05 % cream Apply topically 2 (two) times a day. (Patient taking differently: Apply topically 2 (two) times a day as needed. )     GLUCOSAMINE/CHONDROITIN SULF A (GLUCOSAMINE-CHONDROITIN ORAL) Take 2 tablets by mouth daily.     indapamide (LOZOL) 2.5 MG tablet TAKE 1 TABLET EVERY DAY     losartan (COZAAR) 100 MG tablet Take 1 tablet (100 mg total) by mouth daily.     lutein 20 mg cap Take 1 capsule by mouth daily.     multivitamin (ONE A DAY) per tablet Take 1 tablet by mouth daily.     nitroglycerin (NITROSTAT) 0.4 MG SL tablet Place 1 tablet (0.4 mg total) under the tongue as needed for chest pain (Keep in original bottle).     oxybutynin (DITROPAN) 5 MG tablet Take 1 tablet (5 mg total) by mouth at bedtime. (Patient taking differently: Take 5 mg by mouth at bedtime as needed. )     potassium chloride (K-DUR,KLOR-CON) 10 MEQ tablet Take 1 tablet (10 mEq total) by mouth daily.     triamcinolone (KENALOG) 0.1 % cream Apply topically 2 (two) times a day as needed.     warfarin (COUMADIN) 5 MG tablet Take 5 mg by mouth daily. Adjust dose based on INR results as directed.     Allergies   Allergen Reactions     Oxycodone Nausea And Vomiting     Codeine Nausea And Vomiting     Diazepam  Nausea Only     Morphine Nausea And Vomiting     Social History   Substance Use Topics     Smoking status: Never Smoker     Smokeless tobacco: Never Used     Alcohol use No         Time: total time spent with the patient was 40 minutes of which >50% was spent in counseling and coordination of care

## 2021-06-20 NOTE — PROGRESS NOTES
Cardiac Rehab  Phase II Assessment    Assessment Date: 9/6/18    Diagnosis: PCI, PRIMITIVO to 1st diagonal  Date of Onset: 8/23/18  Procedure: PRIMITIVO to 1st diagonal  Date of Onset: 8/23/18  ICD/Pacemaker: No Parameters: NA  Post-op Complications: NA  ECG History: SR with first degree AVB, LBBB, PACs EF%: 52  Past Medical History: CAD, DVT, PE, vertigo, arthritis, GERD, ablation for SVT, palpitations    Physical Assessment  Precautions/ Physical Limitations: Standard  Oxygen: No  O2 Sats: 96 Lung Sounds: clear Edema: none  Incisions: NA  Sleeping Pattern: good   Appetite: good   Nutrition Risk Screen: Weight loss    Pain  Location: None  Characteristics:NA  Intensity: (0-10 scale) 0  Current Pain Management: NA  Intervention: NA  Response: NA    Psychosocial/ Emotional Health  1. In the past 12 months, have you been in a relationship where you have been abused physically, emotionally, sexually or financially? No  notified: NA  2. Who do you turn to for emotional support?:   3. Do you have cultural or spiritual needs? No  4. Have there been any major life changes in the past 12 months? No    Referral Information  Primary Physician: Pancho Banks MD  Cardiologist: Cinthya  Surgeon:     Home exercise/Equipment: None    Patient's long-term goal(s): Increase strength, lose weight, resume walking and 1 flight of stairs w/o shortness of breath, follow low fat and low sodium diet    1. Living Accommodations: Other: Condo Steps: No      Support people at home:    2. Marital Status:   3. Family is able to assist with cares      Gnosticism/Community involvement: Yes  4. Recreation/Hobbies: Reading

## 2021-06-21 NOTE — LETTER
Letter by Ade Evans RN at      Author: Ade Evans RN Service: -- Author Type: --    Filed:  Encounter Date: 1/6/2021 Status: (Other)         Jaclyn MEDINA Jacquelineyumiko  [address]  [facility] [place] [address]      January 6, 2021      Dear Ms. Gibbs,    You are currently under the care of [facility] for your warfarin (Coumadin ) therapy.  We are contacting you because our records show you were due for an INR on 10/5/20.    There are potentially serious risks when taking warfarin without careful monitoring and we want to make sure you are safely managed.  Routine INR monitoring is required for warfarin refills.     Please call [phone] as soon as possible to schedule an appointment.  If there has been a change in your care or other concerns, please let us know so we can help and or update our records.     Sincerely,       [facility]

## 2021-06-21 NOTE — LETTER
"Letter by Nova Akers RN at      Author: Nova Akers RN Service: -- Author Type: --    Filed:  Encounter Date: 2/18/2021 Status: (Other)       Dear Jaclyn,    Thank you for choosing [facility] for your care.  We are committed to providing you with the highest quality and compassionate healthcare services.  The following information pertains to your first appointment with our clinic.    Date/Time of appointment: Wednesday, February 24, 2021 at 1:00PM, arriving at 12:30 PM  Note: This allows time to complete forms, possible labs and for nursing assessment.    Name of your Physician: Linda Giraldo MD    What to bring to your appointment:  Completed Patient History/Initial Nursing Assessment (this form was sent to you) and Medication/Allergy List   Any paperwork or films from your physician that we have asked you to bring.  Your current insurance card(s) and photo ID.    Parking:  Please refer to the map included to direct you to [facility].  The \"Radiation Oncology\" parking lot is west of the main entrance, this is free parking and is right next to the Cancer Care Entrance.  Come in the Cancer Care Entrance and check in.    We hope these instructions are helpful to you.  If you have any questions or concerns, please call us at [phone].  It is our pleasure to assist you.    Warm Regards,  Nova Akers RN  Nurse Navigator  [phone]             "

## 2021-06-21 NOTE — PROGRESS NOTES
Assessment:   Jaclyn Gibbs is a 86 y.o. y.o. female with past medical history significant for pulmonary embolism (on Coumadin), coronary artery disease (on Plavix), hyperlipidemia, hypertension, osteopenia who presents today for follow-up regarding left-sided low back pain with left radicular/sciatica type leg pain that has had inconsistent response to previous left L5-S1 transforaminal epidural steroid injections.  Patient has significant left L4-5 and L5-S1 foraminal stenosis.  There is significant lateral recess stenosis seen at the L5-S1 level.  The patient has significant scoliosis of the thoracolumbar spine.  The patient's epidural injection in May 2018 provided approximately 4 months of relief.  However the most recent injection performed on September 19, 2018 only provided 1 month of relief.  The patient's MRI of the lumbar spine does show significant left L4-5 and L5-S1 foraminal stenosis.  She is not thought to be a good surgery candidate, as she would be quite high risk.  She is without any red flag symptoms at this time.  She does have some weakness of the left great toe extensor.       Plan:     A shared decision making plan was used.  The patient's values and choices were respected.  The following represents what was discussed and decided upon by the physician and the patient.      1.  DIAGNOSTIC TESTS: The patient's MRI of the lumbar spine was personally reviewed today by the physician with the physician performing her own interpretation.  Again there is significant degenerative changes seen throughout the lumbar spine with significant left L4-5 and L5-S1 foraminal stenosis.  She does have scoliosis seen as well.  -No further diagnostic imaging studies are necessary at this time.  2.  PHYSICAL THERAPY:   -She has previously completed physical therapy.  She was offered a refresher course of physical therapy.  She declines at this time stating that she already does her home exercise program on  a regular basis and would prefer to work with a  as opposed to a physical therapist.  Was offered a doctor's note for the , but she declined, stating it was not necessary.  3.  MEDICATIONS: The patient was offered to try a repeat course of gabapentin.  She states that she has tried this in the past and feels that she had severe side effects to it and she declines to try it again at this time.  If she changes her mind in the future, gabapentin 100 mg can be prescribed and she can be titrated up to a maximum dose of 300 mg 3 times a day to start.  4.  INTERVENTIONS: The patient can continue with the left L5-S1 transforaminal epidural steroid injections.  One is ordered today that she can schedule on or after November 19, 2018.  -If she fails to have longer lasting relief with the left L5-S1 transforaminal epidural steroid injection, could consider a left S1-S2 transforaminal epidural steroid injection.  This is chosen because of the lateral recess stenosis seen and the fact that she has a lot of pain near the distal left lateral ankle.  She does have severe foraminal stenosis of the left L4-5 level, so this level could be potentially targeted as well if the left L5-S1 transforaminal epidural steroid injection fails to provide relief.  5.  PATIENT EDUCATION:    -Discussed with the patient that different injections can provide different amounts of relief.  It may be that she no longer is responding to steroids, or the problem in her back is so severe now that steroids cannot help with the pain.  -Offered to have the patient return to see Dr. Tariq.  She declines at this time stating that both Dr. Tariq and Dr. Aponte told her that surgery would be quite risky for her as it would be an extensive surgery.  She would prefer to try to manage with the injections at this time and declines the offer to return to see neurosurgery.  6.  FOLLOW-UP: She can follow-up on or after  November 19, 2018 for a repeat left L5-S1 transforaminal epidural steroid injection.  If she has any questions or concerns in the interim, she is encouraged to contact the clinic.    Subjective:     Jaclyn Gibbs is a 86 y.o. female who presents today for follow-up regarding left L5-S1 transfemoral epidural steroid injection performed on September 19, 2018.  The patient reports that she had 100% relief of her low back and leg pain, but it only lasted for about 1 month.  Pain has since returned and is quite severe.  She is wondering why the injection that she had in May 2018 would have given her 4 months of relief and the most recent injection would have only provided 1 month relief.  Patient continues to note pain in the left low back with pain radiating down the left posterior lateral leg.  She has a lot of pain concentrating near the left distal lateral ankle.  She rates her pain today at an 8 out of 10.  At best it is a 2 out of 10.  At worst it is a 10 under 10.  Pain is worse with walking.  She does feel better with sitting down.  She is not taking any prescription pain medications at this time.  She thinks that she had previous side effects to gabapentin.    Past medical history is reviewed and is unchanged for any new medical diagnoses in the interim.      Family history is reviewed and is unchanged in the interim.      Review of Systems:  Positive for weakness of the left leg, headache, dizziness, balance changes.  Negative for any numbness or tingling, bowel or bladder incontinence, footdrop, nausea/vomiting, blurry vision.     Objective:   CONSTITUTIONAL:  Vital signs as above.  No acute distress.  The patient is well nourished and well groomed.    PSYCHIATRIC:  The patient is awake, alert, oriented to person, place and time.  The patient is answering questions appropriately with clear speech.  Normal affect.  SKIN:  Skin over the face, posterior torso, bilateral upper and lower extremities is  clean, dry, intact without rashes.  MUSCULOSKELETAL:  Gait is non-antalgic.  She walks with forward flexion at the hips and there is an increased thoracic lordosis.  No significant tenderness over the lateral lower lumbar paraspinal muscles or over the bilateral gluteal muscles.      She has 4/5 strength for the left great toe extensor but 5/5 strength of the right great toe extensor.  The patient has 5/5 strength for the bilateral hip flexors, knee flexors/extensors, ankle dorsiflexors/plantar flexors, ankle evertors/invertors.    NEUROLOGICAL: Trace patellar, medial hamstring, achilles reflexes which are symmetric bilaterally.  No ankle clonus bilaterally.  Sensation to light touch is intact in the bilateral L4, L5, and S1 dermatomes.       RESULTS: MRI of the lumbar spine is personally reviewed today and shows severe degenerative changes seen throughout the lumbar spine.  There is significant left L4-5 and L5-S1 foraminal stenosis.  There is significant disc degeneration and scoliosis seen.

## 2021-06-21 NOTE — PROGRESS NOTES
[facility] Follow-up Note    Assessment & Plan        1. Atherosclerosis of native coronary artery of native heart without angina pectoris -due to increased shortness of breath she underwent stress testing in August showing medium-sized area of moderate to severe ischemia involving the mid to distal anteroseptal and anterior wall of the left ventricle with a medium-sized area of distal anteroseptal and anterior transmural scar with ejection fraction of 52%.  This prompted angiography August 23, 2018 showing normal left main, proximal LAD 25% lesion with a totally occluded first diagonal which was intervened upon with a stent, normal circumflex and normal right coronary artery.  Following this her symptoms are not improved and I doubt that coronary artery disease as the cause of her shortness of breath.   2. Abnormal nuclear stress test -as above anterior scar with anterior ischemia but yet diagonal was intervened upon resulted in no improvement of symptoms.   3. Essential hypertension with goal blood pressure less than 140/90 -blood pressure under good control and if anything a little on the low side and given her orthostasis and fatigue will decrease losartan from 100 down to 50 mg.  We will need to watch for excessive hypertension.   4. Hypercholesteremia -cholesterol 141 with an LDL of 62 from September 17 of this year which is excellent.   5. 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  She is having occasional skips on auscultation today and given her increased symptomatology I will have her wear a 21-day ACT monitor.  She is already anticoagulated but if symptomatic tachycardia or bradycardia is associated with her symptoms then I can address my care.   6. Other acute pulmonary embolism without acute cor pulmonale (H) -she is on chronic Coumadin per primary clinic.   7.  Prior right basal ganglion  CVA-this could be related to her chronic balance issues.  She is on anticoagulation in any event due to her advanced CHADS 2 VASC score of 4.  8.  Increased BNP-suspect this could be related to heart failure and might need furosemide if not taking it.  As above doubt shortness of breath due to ischemia and hemoglobin was normal as his creatinine and doubt these are related.  Consider ruling out thyroid disorder.    Plan  1.  Decrease losartan to 50 mg a day considering orthostasis and off balance.  2.  21-day ACT monitor looking to see if arrhythmias coincide with multiple different symptoms.  3.  She will call us and let us know if she is taking furosemide and if not will restart.  4.  If monitor looks stable will start Imdur 30 mg a day.  5.  Instructed her to stay away from Aleve and try Tylenol for arthritis pain while on Plavix as well as Coumadin.  6.  Plan on discontinuing Plavix around February 23, 2019.  7.  Follow-up with me in about 5 months given all these issues.  8.  Consider thyroid workup but defer to primary given her numerous complaints.    Subjective  CC: 86-year-old white female being seen in follow-up today.  She is still extremely fatigued and tells me she does not feel well although she still lives independently with her  and drives.  She tells me there are days when she gets out of bed and is extremely lightheaded dizzy.  She tells me she feels like when she walks she cannot walk straight and feels off balance.  She has a shortness of breath but she tells me this is been going on for years.  She states that if she walks up and down the hallway or around the parking lot she will get short winded and this did not get any worse or get any better with recent intervention.  There is no chest discomfort, palpitations, syncope, PND, orthopnea or peripheral edema.    Medications  Current Outpatient Prescriptions   Medication Sig     amLODIPine (NORVASC) 5 MG tablet Take 1 tablet (5 mg total)  "by mouth daily.     atorvastatin (LIPITOR) 40 MG tablet Take 1 tablet (40 mg total) by mouth at bedtime.     CALCIUM CARB/MAGNESIUM OXID/D3 (CALCIUM MAGNESIUM + D ORAL) Take by mouth 2 (two) times a day. 750/300/500mg     clopidogrel (PLAVIX) 75 mg tablet Take 1 tablet (75 mg total) by mouth daily.     fluocinonide (LIDEX) 0.05 % cream Apply topically 2 (two) times a day. (Patient taking differently: Apply topically 2 (two) times a day as needed. )     GLUCOSAMINE/CHONDROITIN SULF A (GLUCOSAMINE-CHONDROITIN ORAL) Take 2 tablets by mouth daily.     indapamide (LOZOL) 2.5 MG tablet TAKE 1 TABLET EVERY DAY     losartan (COZAAR) 100 MG tablet Take 1 tablet (100 mg total) by mouth daily.     lutein 20 mg cap Take 1 capsule by mouth daily.     multivitamin (ONE A DAY) per tablet Take 1 tablet by mouth daily.     potassium chloride (K-DUR,KLOR-CON) 10 MEQ tablet Take 1 tablet (10 mEq total) by mouth daily.     triamcinolone (KENALOG) 0.1 % cream Apply topically 2 (two) times a day as needed.     warfarin (COUMADIN) 5 MG tablet Take 5 mg by mouth daily. Adjust dose based on INR results as directed.     furosemide (LASIX) 40 MG tablet Take 1 tablet (40 mg total) by mouth 2 (two) times a day. For 1 week.     nitroglycerin (NITROSTAT) 0.4 MG SL tablet Place 1 tablet (0.4 mg total) under the tongue as needed for chest pain (Keep in original bottle).     oxybutynin (DITROPAN) 5 MG tablet Take 1 tablet (5 mg total) by mouth at bedtime. (Patient taking differently: Take 5 mg by mouth at bedtime as needed. )       Objective  /80 (Patient Site: Left Arm, Patient Position: Sitting, Cuff Size: Adult Regular)  Pulse 78  Resp 16  Ht 5' 3\" (1.6 m)  Wt 168 lb (76.2 kg)  BMI 29.76 kg/m2    General Appearance:    Alert, cooperative, no distress, appears stated age   Head:    Normocephalic, without obvious abnormality, atraumatic   Throat:   Lips, mucosa, and tongue normal; teeth and gums normal   Neck:   Supple, symmetrical, " trachea midline, no adenopathy;        thyroid:  No enlargement/tenderness/nodules; no carotid    bruit or JVD   Back:     Symmetric, no curvature, ROM normal, no CVA tenderness   Lungs:     Clear to auscultation bilaterally, respirations unlabored   Chest wall:    No tenderness or deformity   Heart:    Regular rate and rhythm, S1 and S2 normal, no murmur, rub   or gallop, occasional premature beats   Abdomen:     Soft, non-tender, bowel sounds active all four quadrants,     no masses, no organomegaly   Extremities:   Normal, atraumatic, no cyanosis or edema   Pulses:   2+ and symmetric all extremities   Skin:   Skin color, texture, turgor normal, no rashes or lesions     Results    Lab Results personally reviewed   Lab Results   Component Value Date    CHOL 141 09/17/2018    CHOL 200 (H) 08/23/2018     Lab Results   Component Value Date    HDL 67 09/17/2018    HDL 61 08/23/2018     Lab Results   Component Value Date    LDLCALC 62 09/17/2018    LDLCALC 120 08/23/2018     Lab Results   Component Value Date    TRIG 62 09/17/2018    TRIG 93 08/23/2018     Lab Results   Component Value Date    WBC 4.1 09/17/2018    HGB 13.1 09/17/2018    HCT 39.2 09/17/2018     09/17/2018     Lab Results   Component Value Date    CREATININE 0.84 09/17/2018    BUN 24 09/17/2018     09/17/2018    K 3.6 09/17/2018    CO2 27 09/17/2018     Review of Systems:   General: WNL  Eyes: WNL  Ears/Nose/Throat: WNL  Lungs: Cough, Shortness of Breath  Heart: Shortness of Breath with activity  Stomach: WNL  Bladder: Frequent Urination at Night  Muscle/Joints: Muscle Weakness  Skin: WNL  Nervous System: Daytime Sleepiness  Mental Health: Anxiety     Blood: Easy Bruising         constant/sudden onset

## 2021-06-21 NOTE — LETTER
Letter by Dory Montenegro RN at      Author: Dory Montenegro RN Service: -- Author Type: --    Filed:  Encounter Date: 1/22/2021 Status: (Other)         Jaclyn MEDINA Jacquelineyumiko  [address]  [facility] [place] [address]      January 22, 2021      Dear Ms. Gibbs,    You are currently under the care of [facility] for your warfarin (Coumadin ) therapy.  We are contacting you because our records show you were due for an INR on 10/5/2020.    There are potentially serious risks when taking warfarin without careful monitoring and we want to make sure you are safely managed.  Routine INR monitoring is required for warfarin refills.     Please call [phone] as soon as possible to schedule an appointment.  If there has been a change in your care or other concerns, please let us know so we can help and or update our records.     Sincerely,       [facility]

## 2021-06-22 NOTE — TELEPHONE ENCOUNTER
ANTICOAGULATION  MANAGEMENT PROGRAM    Jaclyn Gibbs is overdue for INR check.  Reminder call made.    Spoke with Jaclyn and scheduled INR appointment on 1/7 .    Ekaterina Torres RN

## 2021-06-22 NOTE — TELEPHONE ENCOUNTER
ANTICOAGULATION  MANAGEMENT    Assessment     Today's INR result of 2.60 is Supratherapeutic (goal INR of 2.0 - 2.5)        Warfarin taken as previously instructed    No new diet changes affecting INR    No new medication/supplements affecting INR    Continues to tolerate warfarin with no reported s/s of bleeding or thromboembolism     Previous INR was Subtherapeutic at 1.60 on 12/17/18.    Discussed with Sandie Galaviz, PharmD.    Plan:     Spoke with Jaclyn regarding INR result and instructed:     Warfarin Dosing Instructions:    - Change warfarin dose to 2.5 mg daily on Mondays; and 5 mg daily rest of week  (7.1 % change)    Instructed patient to follow up no later than:  1-2 wks.   - scheduled on 1/21/19 @ [facility]    Education provided: importance of consistent vitamin K intake and target INR goal and significance of current INR result    Jaclyn verbalizes understanding and agrees to warfarin dosing plan.    Instructed to call the [facility] for any changes, questions or concerns. (#[phone])   ?   Dory Montenegro RN    Subjective/Objective:      Jaclyn Gibbs, a 86 y.o. female is on warfarin.     Jaclyn reports:     Home warfarin dose: as updated on anticoagulation calendar per template     Missed doses: No     Medication changes:  No     S/S of bleeding or thromboembolism:  No     New Injury or illness:  No     Changes in diet or alcohol consumption:  No     Upcoming surgery, procedure or cardioversion:  No    Anticoagulation Episode Summary     Current INR goal:   2.0-3.0   TTR:   73.9 % (1.4 y)   Next INR check:   1/21/2019   INR from last check:   2.60 (1/7/2019)   Weekly max warfarin dose:      Target end date:   Indefinite   INR check location:      Preferred lab:      Send INR reminders to:   ANTICOAGULATION POOL C (DTN,VAD,CGR,GAV)    Indications    Other acute pulmonary embolism without acute cor pulmonale (H) [I26.99]           Comments:   Goal range 2.0 - 2.5 per Dr. Cardona, 12/12/18          Anticoagulation Care Providers     Provider Role Specialty Phone number    Robin Catherine MD Referring Internal Medicine [phone]

## 2021-06-22 NOTE — PROGRESS NOTES
Office Visit - Follow Up   Jaclyn Gibbs   86 y.o. female    Date of Visit: 12/17/2018    Chief Complaint   Patient presents with     Follow-up     fasting      INR Check        Assessment and Plan   1. Acute thromboembolism of deep veins of left lower extremity (H)  Had acute DVT of the left leg which propagated to her left causing PE.  Now on warfarin indefinitely.  Had feet PE from    2. Other acute pulmonary embolism without acute cor pulmonale (H)  Had acute DVT of the left leg in August 2017.  Continues to take warfarin.  Due for her INR.  - INR    3. Paroxysmal Supraventricular Tachycardia  Had paroxysmal SVT and underwent cryoablation  in May 2014 by cardiology.  This corrected her supraventricular tachycardia except for some feeling of skipped beats and occasional palpitation.  Was seen by cardiology, Dr. Barron October 23, 2018.  Was advised to undergo event monitor which was done from 10/31/18 to 11/25/18.  During her monitoring she complained of lightheadedness and dizziness but this coincided with normal sinus rhythm at 80 bpm and only showed moderate PACs and occasional PVCs.  Her event monitor was essentially normal.    4. Essential hypertension with goal blood pressure less than 140/90  Controlled by losartan, furosemide, indapamide and amlodipine.  But because she complains of some dizziness, lightheadedness and also imbalance, was  advised by cardiology to decrease her losartan to 50 mg daily from 100 mg daily and continue with same dose of furosemide, indapamide and amlodipine.  Still complains of intermittent dizziness lightheadedness despite reduction of her losartan.  - losartan (COZAAR) 50 MG tablet; Take 1 tablet (50 mg total) by mouth daily.  Dispense: 90 tablet; Refill: 1    5. Atherosclerosis of native coronary artery of native heart without angina pectoris  Has coronary artery disease status post stent of the first diagonal in August 2018 after complaining of some chest pains  for which she was admitted.  Needs to continue her clopidogrel until at least August of next year.    6. Hypercholesteremia  Controlled.  Continue atorvastatin.  Last lipids were good.    7. Spinal stenosis of lumbar region with radiculopathy, L5 to S1  Underwent epidural cortisone injection to her lower back because of lumbar spinal stenosis by Dr. Torres of [facility] in October.  Still continues to have back pains.  Now has some neck pains.    8. Neck pain  Neck pain started a few weeks ago.  No injury or trauma.  Suspect same osteoarthritis  causing her neck pains.  Advised to see Dr. Torres of  [facility] for this also.    9. Overactive bladder  Overactive bladder.  Supposed to be on oxybutynin but she does not take it nightly.  Has to get up a few times in the evening to void.  Resume oxybutynin.  Does not want to take it nightly.  Okay to take it every other night.    10. Flu vaccine need  Shot was given.  - Influenza High Dose, Seasonal    Reviewed cardiology notes, recommendations and treatment.  Reviewed her event monitor and discussed the results with the patient.    Reviewed Dr. Torres's notes and recommendation of [facility].  Discussed this with the patient.    Follow up in 3 months.     History of Present Illness   This 86 y.o. old female is here for follow-up.  Has several medical problems.  For most of her dizziness and lightheadedness.  Still continues to be dizzy and lightheaded despite cutting down the dose of her losartan after seeing cardiology.  Has hypertension controlled by combination of losartan, amlodipine, furosemide and indapamide.  Has hyperlipidemia controlled by atorvastatin.  Last lipids were good.  Has coronary artery disease status post stent in the first diagonal August 2018.  Does not have chest pain or shortness of breath anymore.  Continues to take clopidogrel.  Had paroxysmal supraventricular tachycardia status post cryoablation in 2014.   Still complains of intermittent palpitation and skipped beats.  Was seen by cardiology for this for follow-up.  Had event monitor which was essentially negative.  Has overactive bladder.  But stopped taking her oxybutynin.  Incurred left leg DVT which propagated to cause pulmonary embolism in August of 2017.  Now on lifetime warfarin.  Due for INR today.  Complains of recurring back pains for which she is followed by the spine care clinic.  Got epidural cortisone injection the lower back in October which provided some relief.  Now complains of neck pains which I suspect due to same process of osteoarthritis.  Overall,  stable and feeling quite well.    Review of Systems   A 12 point comprehensive review of systems was negative except as noted..     Medications, Allergies and Problem List   Reviewed and updated             Chief Complaint   Follow-up (fasting ) and INR Check       Patient Profile   Social History     Social History Narrative    , 6 children, active, is a full code.  (last updated 8/9/2017)         Past Medical History   Patient Active Problem List   Diagnosis     DVT left leg 2008 after TKA     Benign Pigmented Nevus     Seborrheic Keratosis     Basal Cell Carcinoma Of The Skin Of The Face     Lacunar Stroke     Obesity     Osteopenia     Essential hypertension with goal blood pressure less than 140/90     Coronary Artery Disease     Paroxysmal Supraventricular Tachycardia     Palpitations     Hypercholesteremia     Lower Back Pain     Spinal stenosis of lumbar region with radiculopathy, L5 to S1     Weakness of both lower extremities     Back pain     GERD (gastroesophageal reflux disease)     Insomnia, unspecified type     Lower extremity weakness     Scoliosis of thoracolumbar spine     Left lumbar radiculopathy     Other acute pulmonary embolism without acute cor pulmonale (H)     Abnormal nuclear stress test       Past Surgical History  She has a past surgical history that includes  Appendectomy;  section (); pr ablate heart dysrhythm focus (2014); Cardiac catheterization; Coronary angioplasty (2010); Cataract extraction (Bilateral); Excision basal cell carcinoma; Lumbar laminectomy (); Total knee arthroplasty (Right, 2003); Total knee arthroplasty (Left, 2008); Coronary Angiogram (N/A, 2018); Percutaneous Coronary Intervention (N/A, 2018); and Left Heart Catheterization Without Left Ventriculogram (Left, 2018).       Medications and Allergies   Current Outpatient Medications   Medication Sig     amLODIPine (NORVASC) 5 MG tablet Take 1 tablet (5 mg total) by mouth daily.     CALCIUM CARB/MAGNESIUM OXID/D3 (CALCIUM MAGNESIUM + D ORAL) Take by mouth 2 (two) times a day. 750/300/500mg     fluocinonide (LIDEX) 0.05 % cream Apply topically 2 (two) times a day. (Patient taking differently: Apply topically 2 (two) times a day as needed. )     furosemide (LASIX) 40 MG tablet Take 1 tablet (40 mg total) by mouth 2 (two) times a day. For 1 week.     GLUCOSAMINE/CHONDROITIN SULF A (GLUCOSAMINE-CHONDROITIN ORAL) Take 2 tablets by mouth daily.     indapamide (LOZOL) 2.5 MG tablet TAKE 1 TABLET EVERY DAY     losartan (COZAAR) 50 MG tablet Take 1 tablet (50 mg total) by mouth daily.     lutein 20 mg cap Take 1 capsule by mouth daily.     multivitamin (ONE A DAY) per tablet Take 1 tablet by mouth daily.     nitroglycerin (NITROSTAT) 0.4 MG SL tablet Place 1 tablet (0.4 mg total) under the tongue as needed for chest pain (Keep in original bottle).     oxybutynin (DITROPAN) 5 MG tablet Take 1 tablet (5 mg total) by mouth at bedtime. (Patient taking differently: Take 5 mg by mouth at bedtime as needed. )     potassium chloride (K-DUR,KLOR-CON) 10 MEQ tablet Take 1 tablet (10 mEq total) by mouth daily.     triamcinolone (KENALOG) 0.1 % cream Apply topically 2 (two) times a day as needed.     warfarin (COUMADIN/JANTOVEN) 5 MG tablet Take 1 or 1 1/2 tabs daily  "Adjust dose based on INR results as directed..     atorvastatin (LIPITOR) 40 MG tablet Take 1 tablet (40 mg total) by mouth at bedtime.     clopidogrel (PLAVIX) 75 mg tablet Take 1 tablet (75 mg total) by mouth daily.     Allergies   Allergen Reactions     Oxycodone Nausea And Vomiting     Codeine Nausea And Vomiting     Diazepam Nausea Only     Morphine Nausea And Vomiting        Family and Social History   Family History   Problem Relation Age of Onset     Stroke Mother 88     Hypertension Father      Sudden death Father 70        suspected heart attack      Breast cancer Sister      Venous thrombosis Sister 80        At age 80, and had breast CA     Cirrhosis Sister 34     Aneurysm Sister      Other Brother 25        Killed in WWII.     Brain cancer Daughter 5     No Medical Problems Daughter      No Medical Problems Son      No Medical Problems Son      No Medical Problems Son      Dementia Sister         Social History     Tobacco Use     Smoking status: Never Smoker     Smokeless tobacco: Never Used   Substance Use Topics     Alcohol use: No     Drug use: No        Physical Exam       Physical Exam  /80   Pulse 89   Ht 5' 3\" (1.6 m)   Wt 164 lb (74.4 kg)   SpO2 94%   BMI 29.05 kg/m    General appearance: alert, appears stated age, cooperative and no distress  Head: Normocephalic, without obvious abnormality, atraumatic  Throat: lips, mucosa, and tongue normal; teeth and gums normal  Neck: no adenopathy, no carotid bruit, no JVD, supple, symmetrical, trachea midline and thyroid not enlarged, symmetric, no tenderness/mass/nodules  Lungs: clear to auscultation bilaterally  Heart: regular rate and rhythm, S1, S2 normal, no murmur, click, rub or gallop  Abdomen: soft, non-tender; bowel sounds normal; no masses,  no organomegaly  Extremities: extremities normal, atraumatic, no cyanosis or edema  Skin: Skin color, texture, turgor normal. No rashes or lesions  Neurologic: Grossly normal  Musculoskeletal: " Tender lower back and neck but normal range of motion     Additional Information        Pancho Banks MD  Internal Medicine  Contact me at [phone]     Additional Information   Current Outpatient Medications   Medication Sig     amLODIPine (NORVASC) 5 MG tablet Take 1 tablet (5 mg total) by mouth daily.     CALCIUM CARB/MAGNESIUM OXID/D3 (CALCIUM MAGNESIUM + D ORAL) Take by mouth 2 (two) times a day. 750/300/500mg     fluocinonide (LIDEX) 0.05 % cream Apply topically 2 (two) times a day. (Patient taking differently: Apply topically 2 (two) times a day as needed. )     furosemide (LASIX) 40 MG tablet Take 1 tablet (40 mg total) by mouth 2 (two) times a day. For 1 week.     GLUCOSAMINE/CHONDROITIN SULF A (GLUCOSAMINE-CHONDROITIN ORAL) Take 2 tablets by mouth daily.     indapamide (LOZOL) 2.5 MG tablet TAKE 1 TABLET EVERY DAY     losartan (COZAAR) 50 MG tablet Take 1 tablet (50 mg total) by mouth daily.     lutein 20 mg cap Take 1 capsule by mouth daily.     multivitamin (ONE A DAY) per tablet Take 1 tablet by mouth daily.     nitroglycerin (NITROSTAT) 0.4 MG SL tablet Place 1 tablet (0.4 mg total) under the tongue as needed for chest pain (Keep in original bottle).     oxybutynin (DITROPAN) 5 MG tablet Take 1 tablet (5 mg total) by mouth at bedtime. (Patient taking differently: Take 5 mg by mouth at bedtime as needed. )     potassium chloride (K-DUR,KLOR-CON) 10 MEQ tablet Take 1 tablet (10 mEq total) by mouth daily.     triamcinolone (KENALOG) 0.1 % cream Apply topically 2 (two) times a day as needed.     warfarin (COUMADIN/JANTOVEN) 5 MG tablet Take 1 or 1 1/2 tabs daily Adjust dose based on INR results as directed..     atorvastatin (LIPITOR) 40 MG tablet Take 1 tablet (40 mg total) by mouth at bedtime.     clopidogrel (PLAVIX) 75 mg tablet Take 1 tablet (75 mg total) by mouth daily.     Allergies   Allergen Reactions     Oxycodone Nausea And Vomiting     Codeine Nausea And Vomiting     Diazepam Nausea  Only     Morphine Nausea And Vomiting     Social History     Tobacco Use     Smoking status: Never Smoker     Smokeless tobacco: Never Used   Substance Use Topics     Alcohol use: No     Drug use: No         Time: total time spent with the patient was 40 minutes of which >50% was spent in counseling and coordination of care

## 2021-06-23 NOTE — TELEPHONE ENCOUNTER
ANTICOAGULATION  MANAGEMENT    Assessment     Today's INR result of 3.0 is Supratherapeutic (goal INR of 2.0-2.5)        Warfarin taken as previously instructed    No new diet changes affecting INR    Interaction between tylenol and warfarin may be affecting INR and increased risk of bleeding (micromedex).  Patient has been using tylenol as needed for arthritis pain.  Notified this can interact with warfarin but is the safer medication to use for pain.  States she would like to continue using as needed.      Continues to tolerate warfarin with no reported s/s of bleeding or thromboembolism     Previous INR was Supratherapeutic     Result and dosing discussed with PharmD    Plan:     Spoke with Jaclyn regarding INR result and instructed:     Warfarin Dosing Instructions:  Hold warfarin dose today 1/21  then change warfarin dose to 2.5 mg daily on Mon, Fri; and 5 mg daily rest of week  (7 % change)    Instructed patient to follow up no later than: 1-2 weeks     Education provided: importance of therapeutic range, target INR goal and significance of current INR result, Strategies to improve compliance (pillbox, alarm, calendar, etc), potential interaction between warfarin and tylenol and monitoring for bleeding signs and symptoms    Jaclyn verbalizes understanding and agrees to warfarin dosing plan.    Instructed to call the [facility] for any changes, questions or concerns. (#[phone])   ?   Fartun José RN    Subjective/Objective:      Jaclyn Gibbs, a 86 y.o. female is on warfarin.     Jaclyn reports:     Home warfarin dose: verbally confirmed home dose with Jaclyn and updated on anticoagulation calendar     Missed doses: No     Medication changes:  Yes, tylenol as needed.      S/S of bleeding or thromboembolism:  No     New Injury or illness:  No     Changes in diet or alcohol consumption:  No     Upcoming surgery, procedure or cardioversion:  No    Anticoagulation Episode Summary     Current INR  goal:   2.0-3.0   TTR:   74.6 % (1.4 y)   Next INR check:   2/1/2019   INR from last check:   3.00 (1/21/2019)   Weekly max warfarin dose:      Target end date:   Indefinite   INR check location:      Preferred lab:      Send INR reminders to:   ANTICOAGULATION POOL C (DTN,VAD,CGR,GAV)    Indications    Other acute pulmonary embolism without acute cor pulmonale (H) [I26.99]           Comments:   Goal range 2.0 - 2.5 per Dr. Cardona, 12/12/18         Anticoagulation Care Providers     Provider Role Specialty Phone number    Robin Catherine MD Referring Internal Medicine [phone]

## 2021-06-23 NOTE — TELEPHONE ENCOUNTER
Refill Approved    Rx renewed per Medication Renewal Policy. Medication was last renewed on 1/30/2018.  Last OV 12/17/2018.    Emerald Barragan, [facility] Connection Triage/Med Refill 1/31/2019     Requested Prescriptions   Pending Prescriptions Disp Refills     amLODIPine (NORVASC) 5 MG tablet [Pharmacy Med Name: AMLODIPINE BESYLATE 5 MG Tablet] 90 tablet 3     Sig: TAKE 1 TABLET (5 MG TOTAL) BY MOUTH DAILY.    Calcium-Channel Blockers Protocol Passed - 1/30/2019  3:09 PM       Passed - PCP or prescribing provider visit in past 12 months or next 3 months    Last office visit with prescriber/PCP: 12/17/2018 Pancho Banks MD OR same dept: 12/17/2018 Pancho Banks MD OR same specialty: 12/17/2018 Pancho Banks MD  Last physical: Visit date not found Last MTM visit: Visit date not found   Next visit within 3 mo: Visit date not found  Next physical within 3 mo: Visit date not found  Prescriber OR PCP: Pancho Banks MD  Last diagnosis associated with med order: 1. Essential hypertension with goal blood pressure less than 140/90  - amLODIPine (NORVASC) 5 MG tablet [Pharmacy Med Name: AMLODIPINE BESYLATE 5 MG Tablet]; Take 1 tablet (5 mg total) by mouth daily.  Dispense: 90 tablet; Refill: 3    If protocol passes may refill for 12 months if within 3 months of last provider visit (or a total of 15 months).            Passed - Blood pressure filed in past 12 months    BP Readings from Last 1 Encounters:   12/17/18 132/80

## 2021-06-23 NOTE — TELEPHONE ENCOUNTER
ANTICOAGULATION  MANAGEMENT    Assessment     Today's INR result of 2.00 is Therapeutic (goal INR of 2.0 - 2.5)        Warfarin taken as previously instructed    No new diet changes affecting INR    No new medication/supplements affecting INR    Continues to tolerate warfarin with no reported s/s of bleeding or thromboembolism     Previous INR was Supratherapeutic at 3.00 on 1/21/19.    Plan:     Spoke with Jaclyn regarding INR result and instructed:     Warfarin Dosing Instructions:  (has 5mg tabs)   - Continue current warfarin dose 2.5 mg daily on Mon/Fri; and 5 mg daily rest of week.    Instructed patient to follow up no later than:  2 wks.   - scheduled on 2/14/19 @ [facility]    Education provided: importance of consistent vitamin K intake, target INR goal and significance of current INR result and importance of notifying clinic for changes in medications    Jaclyn verbalizes understanding and agrees to warfarin dosing plan.    Instructed to call the [facility] for any changes, questions or concerns. (#[phone])   ?   oDry Montenegro RN    Subjective/Objective:      Jaclyn Gibbs, a 86 y.o. female is on warfarin.     Jaclyn reports:     Home warfarin dose: as updated on anticoagulation calendar per template     Missed doses: Yes: held warfarin dose on 1/21/19     Medication changes:  No:  Continues to take ES-Tylenol for arthritic pains, PRN.     S/S of bleeding or thromboembolism:  No.  Has denied any active bleeding or increased bruising, since INR target goal was changed to 2.0 - 2.5.     New Injury or illness:  No.     Changes in diet or alcohol consumption:  No     Upcoming surgery, procedure or cardioversion:  No    Anticoagulation Episode Summary     Current INR goal:      TTR:   75.1 % (1.4 y)   Next INR check:   2/14/2019   INR from last check:   2.00 (1/31/2019)   Goal at result date:   2.0-3.0   Weekly max warfarin dose:      Target end date:   Indefinite   INR check location:       Preferred lab:      Send INR reminders to:   ANTICOAGULATION POOL C (DTN,VAD,CGR,GAV)    Indications    Other acute pulmonary embolism without acute cor pulmonale (H) [I26.99]           Comments:   Goal range 2.0 - 2.5 per Dr. Cardona, 12/12/18         Anticoagulation Care Providers     Provider Role Specialty Phone number    Robin Catherine MD Referring Internal Medicine [phone]

## 2021-06-24 NOTE — TELEPHONE ENCOUNTER
ANTICOAGULATION  MANAGEMENT    Assessment     Today's INR result of 1.60 is Subtherapeutic (goal INR of 2.0 - 2.5)        Less warfarin taken than instructed which may be affecting INR    No new diet changes affecting INR    No interaction expected between Amlodipine and warfarin    Potential interaction between Steroid injection of LS and warfarin which may affect subsequent INRs    Continues to tolerate warfarin with no reported s/s of bleeding or thromboembolism     Previous INR was Subtherapeutic at 1.60 on 3/1/19.    3/7[facility]19 [facility]    Plan:     Spoke with Jaclyn regarding INR result and instructed:     Warfarin Dosing Instructions:  (has 5mg tabs).   - ensure to take correct warfarin dose.  (reported taking 2.5mg 2x/wk and 5mg ROW).   - Continue current warfarin dose 2.5 mg daily on Mondays; and 5 mg daily rest of week.    Instructed patient to follow up no later than:  1-2 wks.   - scheduled on 3/14/19 @ [facility], during Rheumatology appt.    Education provided: importance of consistent vitamin K intake, target INR goal and significance of current INR result, importance of taking warfarin as instructed, potential interaction between warfarin and Steroid injection of lumbar / sacral spine, importance of notifying clinic for changes in medications and importance of notifying clinic of upcoming surgeries and procedures 2 weeks in advance    Jaclyn verbalizes understanding and agrees to warfarin dosing plan.    Instructed to call the [facility] for any changes, questions or concerns. (#[phone])   ?   Dory Montenegro RN    Subjective/Objective:      Jaclyn Gibbs, a 86 y.o. female is on warfarin.     Jaclyn reports:     Home warfarin dose: Jaclyn reported incorrect warfarin dose; MAREK verbally confirmed home dose with Jaclyn and updated on anticoagulation calendar.  Warfarin dose was increased on 3/1/19 from 30mg to 32.5mg/wk.     Missed doses:  No     Medication changes:  Yes:  On 3/7/19 Steroidal injection of lumbar / sacral region.     - on 3/6/19 Amlodipine was decreased from 5mg to 2.5mg daily.     S/S of bleeding or thromboembolism:  No     New Injury or illness:  Yes:  Back pains.  Also reported she still gets shortness of breath.  Dr. Cardona ordered Nuclear Stress test.     Changes in diet or alcohol consumption:  No     Upcoming surgery, procedure or cardioversion:  Yes.  Scheduled on 3/15/19 for Nuclear stress test.    Anticoagulation Episode Summary     Current INR goal:      TTR:   74.6 % (1.5 y)   Next INR check:   3/21/2019   INR from last check:   1.60! (3/6/2019)   Goal at result date:   2.0-3.0   Weekly max warfarin dose:      Target end date:   Indefinite   INR check location:      Preferred lab:      Send INR reminders to:   ANTICOAGULATION POOL C (DTN,VAD,CGR,GAV)    Indications    Other acute pulmonary embolism without acute cor pulmonale (H) [I26.99]           Comments:   Goal range 2.0 - 2.5 per Dr. Cardona, 12/12/18         Anticoagulation Care Providers     Provider Role Specialty Phone number    Robin Catherine MD Referring Internal Medicine [phone]

## 2021-06-24 NOTE — TELEPHONE ENCOUNTER
"Patient returned call. Reports her pain started about a month ago. It is pain around he left ankle and at times into the calf, \"just like it always is. I've pain trying to hold off to the 3 month rand between injections, but if I wait too much longer I will be bedridden.\" Injection requirements reviewed with patient. Stated understanding. Transferred to scheduling to make appointment.    "

## 2021-06-24 NOTE — TELEPHONE ENCOUNTER
Attempted to call pt to schedule injection. Line busy. Will leave [employer] message for pt. Order placed for injection. Need to go over injection requirements with pt.

## 2021-06-24 NOTE — PATIENT INSTRUCTIONS - HE
Follow-up visit with Dr. Torres in 2 weeks if symptoms worsen or fail to improve.  Otherwise, please follow-up on an as-needed basis going forward.       DISCHARGE INSTRUCTIONS    During office hours (8:00 a.m.- 4:30 p.m.) questions or concerns may be answered  by calling Spine Navigation Nurses at  [phone].     If you experience any problems after hours  please call [phone] and you will be connected to [facility].     All Patients:    ? You may experience an increase in your symptoms for the first 2 days (It may take anywhere between 2 days- 2 weeks for the steroid to have maximum effect).    ? You may use ice on the injection site, as frequently as 20 minutes each hour if needed.    ? You may take your pain medicine.    ? You may continue taking your regular medication after your injection. If you have had a Medial Branch Block you may resume pain medication once your pain diary is completed.    ? You may shower. No swimming, tub bath or hot tub for 48 hours.  You may remove your bandaid/bandage as soon as you are home.    ? You may resume light activities, as tolerated.    ? Resume your usual diet as tolerated.    ? It is strongly advised that you do not drive for 1-3 hours post injection.    ? If you have had oral sedation:  Do not drive for 8 hours post injection.      ? If you have had IV sedation:  Do not drive for 24 hours post injection.  Do not operate hazardous machinery or make important personal/business decisions for 24 hours.      POSSIBLE STEROID SIDE EFFECTS (If steroid/cortisone was used for your procedure)    -If you experience these symptoms, it should only last for a short period      Swelling of the legs                Skin redness (flushing)       Mouth (oral) irritation     Blood sugar (glucose) levels              Sweats                      Mood changes    Headache    Sleeplessness         POSSIBLE PROCEDURE SIDE EFFECTS  -Call the Spine Center if you are  concerned    Increased Pain             Increased numbness/tingling        Nausea/Vomiting            Bruising/bleeding at site        Redness or swelling                                                Difficulty walking        Weakness             Fever greater than 100.5    *In the event of a severe headache after an epidural steroid injection that is relieved by lying down, please call the [facility] to speak with a clinical staff member*

## 2021-06-24 NOTE — PATIENT INSTRUCTIONS - HE
Ms Jaclyn Gibbs,  I enjoyed visiting with you again today.  I am glad to hear you are doing well.  Per our conversation cut the AMLODIPINE in 1/2 for a 2.5 mg dose a day. Let us get the stress test again and if looks worse will address.  I will plan on seeing you 6 months.  Chago Cardona

## 2021-06-24 NOTE — PROGRESS NOTES
[facility] Follow-up Note    Assessment & Plan        1. Coronary atherosclerosis due to lipid rich plaque -due to increased shortness of breath she underwent stress testing in August 2018 showing medium-sized area of moderate to severe ischemia involving the mid to distal anteroseptal and anterior wall of the left ventricle with a medium-sized area of distal anteroseptal and anterior transmural scar with ejection fraction of 52%.  This prompted angiography August 23, 2018 showing normal left main, proximal LAD 25% lesion with a totally occluded first diagonal which was intervened upon with a stent, normal circumflex and normal right coronary artery.  Following this her symptoms are not improved and I doubt that coronary artery disease as the cause of her shortness of breath.  Given persistent symptoms I will recheck stress test and if similar or better no intervention but if worse angiography.   2. 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   3. Continued shortness of breath-rule out ischemia as above, defer to primary that may be she may need a hemoglobin checked.   4. Hypercholesteremia-total cholesterol 141 with an LDL of 62 from September 2018 which is excellent.   5. Essential hypertension with goal blood pressure less than 140/90 -under good control and if anything may be a little on the low side.  Given her age we will back off on the dose of the amlodipine to 200 mg a day.   6. 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  She is having occasional skips on auscultation today and given her increased symptomatology ACT monitor showed frequent PACs without any true atrial fibrillation.  She is already anticoagulated but if symptomatic tachycardia or bradycardia is associated with her symptoms then I can address.     Plan  1.  Speak with primary in case she has polymyalgia rheumatica  and need to check hemoglobin.  2.  Pharmacological nuclear stress test and address if abnormal.  3.  Follow-up with me in about 6 months or sooner if needed.  4.  Decrease amlodipine to 2.5 mg a day.    Subjective  CC: 86-year-old white female being seen in follow-up today.  She still is troubled by her health, she states she feels awful, has shortness of breath on minimal activity without any PND, orthopnea or cough.  She feels joint discomfort all over, she has no syncope or dizziness or angina or palpitations or significant peripheral edema.    Medications  Current Outpatient Medications   Medication Sig     amLODIPine (NORVASC) 5 MG tablet TAKE 1 TABLET (5 MG TOTAL) BY MOUTH DAILY.     atorvastatin (LIPITOR) 40 MG tablet Take 1 tablet (40 mg total) by mouth at bedtime.     CALCIUM CARB/MAGNESIUM OXID/D3 (CALCIUM MAGNESIUM + D ORAL) Take by mouth 2 (two) times a day. 750/300/500mg     fluocinonide (LIDEX) 0.05 % cream Apply topically 2 (two) times a day. (Patient taking differently: Apply topically 2 (two) times a day as needed. )     GLUCOSAMINE/CHONDROITIN SULF A (GLUCOSAMINE-CHONDROITIN ORAL) Take 2 tablets by mouth daily.     indapamide (LOZOL) 2.5 MG tablet TAKE 1 TABLET EVERY DAY     losartan (COZAAR) 50 MG tablet Take 1 tablet (50 mg total) by mouth daily.     lutein 20 mg cap Take 1 capsule by mouth daily.     multivitamin (ONE A DAY) per tablet Take 1 tablet by mouth daily.     nitroglycerin (NITROSTAT) 0.4 MG SL tablet Place 1 tablet (0.4 mg total) under the tongue as needed for chest pain (Keep in original bottle).     potassium chloride (K-DUR,KLOR-CON) 10 MEQ tablet Take 1 tablet (10 mEq total) by mouth daily.     triamcinolone (KENALOG) 0.1 % cream Apply topically 2 (two) times a day as needed.     warfarin (COUMADIN/JANTOVEN) 5 MG tablet Take 1 or 1 1/2 tabs daily Adjust dose based on INR results as directed..     clopidogrel (PLAVIX) 75 mg tablet Take 1 tablet (75 mg total) by mouth daily.      "oxybutynin (DITROPAN) 5 MG tablet Take 1 tablet (5 mg total) by mouth at bedtime. (Patient taking differently: Take 5 mg by mouth at bedtime as needed. )       Objective  /62 (Patient Site: Left Arm, Patient Position: Sitting, Cuff Size: Adult Regular)   Pulse 84   Resp 16   Ht 5' 3\" (1.6 m)   Wt 163 lb (73.9 kg)   BMI 28.87 kg/m      General Appearance:    Alert, cooperative, no distress, appears stated age   Head:    Normocephalic, without obvious abnormality, atraumatic   Throat:   Lips, mucosa, and tongue normal; teeth and gums normal   Neck:   Supple, symmetrical, trachea midline, no adenopathy;        thyroid:  No enlargement/tenderness/nodules; no carotid    bruit or JVD   Back:     Symmetric, no curvature, ROM normal, no CVA tenderness   Lungs:     Clear to auscultation bilaterally, respirations unlabored   Chest wall:    No tenderness or deformity   Heart:    Regular rate and rhythm, S1 and S2 normal, no murmur, rub   or gallop, occasional skips   Abdomen:     Soft, non-tender, bowel sounds active all four quadrants,     no masses, no organomegaly   Extremities:   Normal, atraumatic, no cyanosis or edema   Pulses:   2+ and symmetric all extremities   Skin:   Skin color, texture, turgor normal, no rashes or lesions     Results    Lab Results personally reviewed   Lab Results   Component Value Date    CHOL 141 09/17/2018    CHOL 200 (H) 08/23/2018     Lab Results   Component Value Date    HDL 67 09/17/2018    HDL 61 08/23/2018     Lab Results   Component Value Date    LDLCALC 62 09/17/2018    LDLCALC 120 08/23/2018     Lab Results   Component Value Date    TRIG 62 09/17/2018    TRIG 93 08/23/2018     Lab Results   Component Value Date    WBC 4.1 09/17/2018    HGB 13.1 09/17/2018    HCT 39.2 09/17/2018     09/17/2018     Lab Results   Component Value Date    CREATININE 0.84 09/17/2018    BUN 24 09/17/2018     09/17/2018    K 3.6 09/17/2018    CO2 27 09/17/2018     Review of Systems: "   General: WNL  Eyes: WNL  Ears/Nose/Throat: WNL  Lungs: Cough, Shortness of Breath  Heart: Shortness of Breath with activity  Stomach: WNL  Bladder: Frequent Urination at Night  Muscle/Joints: Muscle Weakness  Skin: WNL  Nervous System: Daytime Sleepiness, Loss of Balance  Mental Health: WNL     Blood: WNL

## 2021-06-24 NOTE — TELEPHONE ENCOUNTER
ANTICOAGULATION  MANAGEMENT    Assessment     Today's INR result of 2.7 is Supratherapeutic (goal INR of 2.0-2.5)        More warfarin taken than instructed which may be affecting INR    No new diet changes affecting INR    No new medication/supplements affecting INR    Continues to tolerate warfarin with no reported s/s of bleeding or thromboembolism     Previous INR was Therapeutic     Looking to have another lumber steroid injection in early march pending insurance approval. Previously performed at spine center and tolerated with therapeutic INR    Plan:     Spoke with Jaclyn regarding INR result and instructed:     Warfarin Dosing Instructions:  Resume previously instructed warfarin dose ( with days of week changed):     2.5 mg daily on Mon/Thur; and 5 mg daily rest of week     Instructed patient to follow up no later than: 2 weeks    Education provided: target INR goal and significance of current INR result and importance of discussing anticoagulation therapy with pain center provider prior to injection again    Jaclyn verbalizes understanding and agrees to warfarin dosing plan.    Instructed to call the [facility] for any changes, questions or concerns. (#[phone])   ?   Sandie Galaviz, PharmD    Subjective/Objective:      Jaclyn Gibbs, a 86 y.o. female is on warfarin.     Jaclyn reports:     Home warfarin dose: verbally confirmed home dose with Jaclyn and updated on anticoagulation calendar-took more than instructed; lost instruction sheet from last call     Missed doses: No     Medication changes:  No, using consistent amount of tylenol 2 tablets per day for at least last month with sciatic pain returning.     S/S of bleeding or thromboembolism:  No     New Injury or illness:  No     Changes in diet or alcohol consumption:  No     Upcoming surgery, procedure or cardioversion:  No    Anticoagulation Episode Summary     Current INR goal:      TTR:   75.7 % (1.5 y)   Next INR check:   2/28/2019    INR from last check:   2.70 (2/14/2019)   Goal at result date:   2.0-3.0   Weekly max warfarin dose:      Target end date:   Indefinite   INR check location:      Preferred lab:      Send INR reminders to:   ANTICOAGULATION POOL C (DTN,VAD,CGR,GAV)    Indications    Other acute pulmonary embolism without acute cor pulmonale (H) [I26.99]           Comments:   Goal range 2.0 - 2.5 per Dr. Cardona, 12/12/18         Anticoagulation Care Providers     Provider Role Specialty Phone number    Robin Catherine MD Referring Internal Medicine [phone]

## 2021-06-24 NOTE — TELEPHONE ENCOUNTER
ANTICOAGULATION  MANAGEMENT    Assessment     Today's INR result of 1.60 is Subtherapeutic (goal INR of 2.0 - 2.5)        Warfarin taken as previously instructed    No new diet changes affecting INR    No new medication/supplements affecting INR    Continues to tolerate warfarin with Yes reported s/s of bleeding.   - reported hemorrhoidal bleeding when she has bowel movement.    Previous INR was Supratherapeutic at 2.70     Discussed warfarin dosing with pharmacist, Sandie Galaviz, PharmD    Plan:     Spoke with Jaclyn regarding INR result and instructed:    1.  Jaclyn will let Dr. Cardona know, since starting warfarin; she has noted hemorrhoid bleeding during BM, especially in the last week.  Dr. Cardona did lower INR target goal from 2.0 - 3.0 to 2.0 - 2.5    Warfarin Dosing Instructions:   (has 5mg tabs)   - just for tonight, take one time booster with 7.5mg warfarin dose,   - then change warfarin dose to 2.5 mg daily on Mondays; and 5 mg daily rest of week.   - (8.3 % change)    Instructed patient to follow up no later than:  One wk.   - scheduled on 3/6/19 during f/u visit with Dr. Cardona.    Education provided: target INR goal and significance of current INR result, importance of notifying clinic for changes in medications, monitoring for bleeding signs and symptoms and when to seek medical attention/emergency care    Jaclyn verbalizes understanding and agrees to warfarin dosing plan.    Instructed to call the [facility] for any changes, questions or concerns. (#[phone])   ?   Dory Montenegro RN    Subjective/Objective:      Jaclyn Gibbs, a 86 y.o. female is on warfarin.     Jaclyn reports:     Home warfarin dose: as updated on anticoagulation calendar per template     Missed doses: No     Medication changes:  Yes.  ES-Tylenol Arthritis 650mg one tablet in the morning / evening.     S/S of bleeding or thromboembolism:  Yes:  Reported hemorroidal bleeding when she has bowel movement.  She  is not constipated.     New Injury or illness:  Yes:  Reported shortness of breath persists.  Has appt with cardiologist, Dr. Cardona on 3/6/19.     Changes in diet or alcohol consumption:  No     Upcoming surgery, procedure or cardioversion:  Yes:  Scheduled at [facility] Clinic for TF-LEO lumbar / sacral steroid injection on 3/7/19.    Anticoagulation Episode Summary     Current INR goal:      TTR:   75.4 % (1.5 y)   Next INR check:   3/8/2019   INR from last check:   1.60! (3/1/2019)   Goal at result date:   2.0-3.0   Weekly max warfarin dose:      Target end date:   Indefinite   INR check location:      Preferred lab:      Send INR reminders to:   ANTICOAGULATION POOL C (DTN,VAD,CGR,GAV)    Indications    Other acute pulmonary embolism without acute cor pulmonale (H) [I26.99]           Comments:   Goal range 2.0 - 2.5 per Dr. Cardona, 12/12/18         Anticoagulation Care Providers     Provider Role Specialty Phone number    Robin Catherine MD Referring Internal Medicine [phone]

## 2021-06-27 NOTE — PROGRESS NOTES
Progress Notes by Brody Bolaños MD at 7/22/2019 12:50 PM     Author: Brody Bolaños MD Service: -- Author Type: Physician    Filed: 7/22/2019  1:26 PM Encounter Date: 7/22/2019 Status: Signed    : Bordy Bolaños MD (Physician)           Click to link to [facility] Note    Jaclyn Gibbs was advised by the nurse clinician for Dr. Chago Cardona to meet with me today at the [facility] to evaluate her dyspnea on exertion which has failed to improve despite coronary stenting last August.     Assessment:    1. NIELSON (dyspnea on exertion)  Echo Complete    BNP(B-type Natriuretic Peptide)    Hemoglobin    Basic Metabolic Panel    ECG 12 lead with MUSE   2. Dizziness     3. Essential hypertension with goal blood pressure less than 140/90     4. Coronary atherosclerosis due to lipid rich plaque         Plan:    1. We will proceed with the testing which was previously recommended by Dr. Cardona.  Our team will call her with the results and further advice.  If her echocardiogram suggest pulmonary hypertension, I would recommend a ventilation/perfusion study to see if she has evidence of chronic thromboembolic pulmonary hypertension.  If her BNP level is rising, diuresis could be tried empirically. If a cardiac cause of dyspnea is not found, she should meet with Dr. Banks to discuss further evaluation for noncardiac causes, especially if her hemoglobin is trending lower.  2. Her dizziness does not appear to have a cardiac cause.  Please see the Holter report below.  I have asked her to discuss this further with Dr. Banks.    An After Visit Summary was printed and given to the patient.    Primary Cardiologist:  Dr. Chago Cardona    Current History:    Jaclyn has been short of breath for more than a year.  Stenting of a stenotic diagonal branch stenosis last August with documented myocardial ischemia did not improve her  shortness of breath.  She also describes weakness in her legs and is known to have lumbar radiculopathy.  She describes frequent dizziness or lightheadedness but not vertigo.  This problem has been going on for more than 6 months.  She is dizzy today but is not hypotensive.    Jaclyn does not describe angina pectoris, orthopnea, anorexia, syncope or lower extremity edema.    Patient Active Problem List   Diagnosis   ? Obesity   ? Osteopenia   ? Essential hypertension with goal blood pressure less than 140/90   ? Coronary Artery Disease   ? Hypercholesteremia   ? GERD (gastroesophageal reflux disease)   ? Left lumbar radiculopathy   ? OAB (overactive bladder)       Past Medical History:  Past Medical History:   Diagnosis Date   ? Acute pancreatitis 04/2013   ? Basal cell carcinoma    ? Basal Cell Carcinoma Of The Skin Of The Face     Created by Conversion  Replacement Utility updated for latest IMO load   ? Carpal tunnel syndrome    ? Coronary artery disease    ? CVA (cerebral vascular accident) (H) 12/11/2009    Posterior limb right internal capsule ischemic stroke.   ? DVT left leg 2008 after TKA     Created by [facility] Annotation: Dec 22 2008 11:46AM - Pancho Banks: left leg,  11/20/08. S/P L knee replacement 11/5/08.  Replacement Utility updated for latest IMO load   ? GERD (gastroesophageal reflux disease)    ? History of pulmonary embolism 5/23/2019   ? Hyperlipidemia    ? Hypertension    ? Lacunar Stroke     Created by [facility] Annotation: Apr 26 2010  3:02PM Pancho Rawls: right,  12/2009.  Replacement Utility updated for latest IMO load   ? Lumbar spondylosis    ? Osteoarthritis    ? Osteopenia 02/12/2016   ? Overactive bladder    ? Paroxysmal supraventricular tachycardia (H)    ? Peroneal DVT (deep venous thrombosis), left (H) 11/19/2008    after left knee replacement. Was on warfarin for some time.   ? Personal history of DVT (deep vein thrombosis) 5/23/2019   ?  Pulmonary embolism on right (H) 2017   ? Scoliosis    ? Scoliosis of thoracolumbar spine 2017   ? Spinal stenosis of lumbar region with radiculopathy, L5 to S1 2016   ? Vertigo        Past Surgical History:  Past Surgical History:   Procedure Laterality Date   ? APPENDECTOMY     ? BASAL CELL CARCINOMA EXCISION      upper back and left chin   ? CARDIAC CATHETERIZATION     ? CATARACT EXTRACTION Bilateral    ?  SECTION  1970   ? CORONARY ANGIOPLASTY  2010    stent placed to the first diagonal   ? CV CORONARY ANGIOGRAM N/A 2018    Procedure: Coronary Angiogram;  Surgeon: Christopher Mendes MD;  Location: [facility] Cath Lab;  Service:    ? CV LEFT HEART CATHETERIZATION WO LEFT VETRICULOGRAM Left 2018    Procedure: Left Heart Catheterization Without Left Ventriculogram;  Surgeon: Christopher Mendes MD;  Location: [facility] Cath Lab;  Service:    ? LUMBAR LAMINECTOMY  2009    L4-L5.   ? SD ABLATE HEART DYSRHYTHM FOCUS  2014    Description: Catheter Ablation Atrial Supraventricular Tachycardia;  Comments: typical AVNRT,  successful cryoablation   ? TOTAL KNEE ARTHROPLASTY Right 2003   ? TOTAL KNEE ARTHROPLASTY Left 2008       Family History:  Family History   Problem Relation Age of Onset   ? Stroke Mother 88   ? Hypertension Father    ? Sudden death Father 70        suspected heart attack    ? Breast cancer Sister    ? Venous thrombosis Sister 80        At age 80, and had breast CA   ? Cirrhosis Sister 34   ? Aneurysm Sister    ? Other Brother 25        Killed in WWII.   ? Brain cancer Daughter 5   ? No Medical Problems Daughter    ? No Medical Problems Son    ? No Medical Problems Son    ? No Medical Problems Son    ? Dementia Sister        Social History:   reports that she has never smoked. She has never used smokeless tobacco. She reports that she does not drink alcohol or use drugs.    Medications:  Outpatient Encounter Medications as of 2019    Medication Sig Dispense Refill   ? amLODIPine (NORVASC) 5 MG tablet TAKE 1 TABLET (5 MG TOTAL) BY MOUTH DAILY. 90 tablet 3   ? atorvastatin (LIPITOR) 40 MG tablet Take 1 tablet (40 mg total) by mouth at bedtime. 90 tablet 5   ? CALCIUM CARB/MAGNESIUM OXID/D3 (CALCIUM MAGNESIUM + D ORAL) Take by mouth 2 (two) times a day. 750/300/500mg     ? clopidogrel (PLAVIX) 75 mg tablet Take 1 tablet (75 mg total) by mouth daily. 90 tablet 5   ? fluocinonide (LIDEX) 0.05 % cream Apply topically 2 (two) times a day. (Patient taking differently: Apply topically 2 (two) times a day as needed. ) 30 g 1   ? GLUCOSAMINE/CHONDROITIN SULF A (GLUCOSAMINE-CHONDROITIN ORAL) Take 2 tablets by mouth daily.     ? indapamide (LOZOL) 2.5 MG tablet TAKE 1 TABLET EVERY DAY 90 tablet 3   ? lidocaine (LMX) 4 % cream Apply topically 3 (three) times a day as needed. pain 30 g 0   ? losartan (COZAAR) 50 MG tablet Take 1 tablet (50 mg total) by mouth daily. 90 tablet 3   ? multivitamin (ONE A DAY) per tablet Take 1 tablet by mouth daily.     ? nitroglycerin (NITROSTAT) 0.4 MG SL tablet Place 1 tablet (0.4 mg total) under the tongue as needed for chest pain (Keep in original bottle). 45 tablet 6   ? oxybutynin (DITROPAN XL) 5 MG ER tablet Take 1 tablet (5 mg total) by mouth daily. 90 tablet 1   ? potassium chloride (K-DUR,KLOR-CON) 10 MEQ tablet Take 1 tablet (10 mEq total) by mouth daily. 90 tablet 3   ? traMADol (ULTRAM) 50 mg tablet Take 1-2 tablets ( mg total) by mouth every 6 (six) hours as needed for pain. 10 tablet 0   ? warfarin (COUMADIN/JANTOVEN) 5 MG tablet Take 1 or 1 1/2 tabs daily Adjust dose based on INR results as directed.. 110 tablet 3   ? cyclobenzaprine (FLEXERIL) 10 MG tablet Take 1 tablet (10 mg total) by mouth 2 (two) times a day as needed for muscle spasms. 20 tablet 0     No facility-administered encounter medications on file as of 7/22/2019.        Allergies  Oxycodone; Codeine; Diazepam; and Morphine    Review of  "Systems    General: WNL  Eyes: WNL  Ears/Nose/Throat: WNL  Lungs: Shortness of Breath  Heart: Shortness of Breath with activity, Irregular Heartbeat  Stomach: WNL  Bladder: Frequent Urination at Night  Muscle/Joints: WNL  Skin: WNL  Nervous System: WNL  Mental Health: WNL     Blood: WNL    Objective:    Wt Readings from Last 5 Encounters:   07/22/19 159 lb (72.1 kg)   06/27/19 163 lb (73.9 kg)   05/23/19 164 lb (74.4 kg)   04/17/19 163 lb (73.9 kg)   03/06/19 163 lb (73.9 kg)      5' 3\" (1.6 m)  Body mass index is 28.17 kg/m .  /76 (Patient Site: Left Arm, Patient Position: Sitting, Cuff Size: Adult Regular)   Pulse 72   Resp 16   Ht 5' 3\" (1.6 m)   Wt 159 lb (72.1 kg)   BMI 28.17 kg/m       Physical Exam:    General Appearance: Alert and not in distress   HEENT: No scleral icterus; the mucous membranes are pink and moist   Neck: No cervical bruits, adenopathy, or thyromegaly; jugular venous pressure is less than 5 cm   Chest: The chest is symmetric   Lungs: Respirations are unlabored; the lungs are clear to auscultation   Cardiovasular: Auscultation reveals normal first and second heart sounds and no murmurs, rubs, or gallops   Extremities: No cyanosis, clubbing or edema   Skin: No xanthelasma   Neurologic: Normal gait and coordination   Psychiatric: Mood and affect are normal       Cardiac testing:    EKG: is pending at the time of this note    PATIENT ACTIVATED MONITOR (VENU)     INTERPRETATION DATE:  11/26/2018      TEST DATE:  10/31/2018 through 11/25/2018     INTERPRETATION:  Jaclyn Gibbs had an ACT type patient activated ECG monitor  between 10/31/2018 and 11/25/2018 for unspecified atrial fibrillation.   Approximately 7 days of missing data were noted.  Baseline transmission on  10/31/2018 showed sinus rhythm, average rate 80 beats per minute with first-degree  AV block and a nonspecific IVCD.  Patient had occasional atrial premature beats and  rare ventricular premature beats.  Patient had " multiple transmissions recorded for  which there were no symptoms specified.  Predominant rhythm was sinus rhythm with  rates between 50 and 80 beats per minute with first-degree AV block and bundle  branch block.  Atrial premature beats and atrial couplets were commonly seen.   Ventricular premature beats were infrequently seen.  Minor sinus arrhythmia was  noted on occasion with a rare nonconducted atrial premature beat.  Patient describes  symptoms of lightheadedness and dizziness on 11/18/2018 at 11:04 a.m.  This is  associated with normal sinus rhythm, rate 80 beats per minute, first-degree AV block  and an IVCD.  Atrial premature beats and couplets were present and atrial premature  beats were frequently noted with occasional atrial couplets present.  No atrial  fibrillation was noted during the monitoring period and there were no pauses greater  than 3 seconds.     CONCLUSION:  Lightheadedness and dizziness on 11/18/2018 associated with sinus  rhythm, rate 80 beats per minute; first degree AV block and bundle branch block with  moderate number of atrial premature beats and occasional atrial couplets.        RENZO STILL MD    Echocardiogram:  Ordered in September, 2018, but never completed.    Cardiac Catheterization:   Results for orders placed during the hospital encounter of 08/23/18   Cardiac Catheterization [CATH01] 08/23/2018    Narrative   Estimated blood loss was <20 ml.    The LM vessel was moderate and is considered normal.    The left circumflex was moderate.    The RCA was moderate and is considered normal.    1st Diag lesion 100% stenosed.    A drug eluting stent was successfully placed.    Ost 1st Diag to 1st Diag lesion 5% stenosed.    Prox LAD to Mid LAD lesion 25% stenosed.     Pt with hx of PCI to diagonal returns with progressive dyspnea over the   past several months and abn stress imaging study    Angiography via left radial (noted tortous radial artery)  LM long normal  LAD stent in  diagonal patent with  just distal to stent; mild diffuse   dz in prox diagonal and mid LAD  circ min dz  RCa normal    LVEDP 6mmHg    PCI;  1. PTCA/stent diagonal use of crossit wire and additional wire in Circ for   support.    Imp/plan:  1. CAD s/p PCI to Diagonal with PRIMITIVO - clopidogrel 75mg for 9-12 months,   start atorvastatin 40mg, cardiac rehab, follow up with Dr. Banks, Dr. Cardona and NP in Heart care clinic   2. afib - restart warfarin tonight, INR on Monday               Results for orders placed during the hospital encounter of 03/15/19   NM Pharmacologic Stress Test [FJJ543] 03/15/2019    Narrative   The pharmacologic nuclear stress test is abnormal.    There is a small area of nontransmural infarct in the apical segment(s)   of the left ventricle.    The left ventricular ejection fraction is 50%.    When compared to the images of 8/14/2018, the previously reported   ischemic area is no longer present. the apical infarct is unchanged.          Imaging:    XR CHEST 2 VIEWS  8/20/2018 3:33 PM     INDICATION: Encounter for other preprocedural examination.  COMPARISON: 08/15/2017.     FINDINGS: Hyperexpanded lungs. Minimal streaky basilar opacities, likely atelectasis or scarring. No focal consolidation. No pleural effusion or pneumothorax. Stable mildly prominent cardiac silhouette. Aortic atherosclerosis. Thoracolumbar scoliosis. No   significant change since prior.    Lab Review:    Lab Results   Component Value Date     04/17/2019    K 3.3 (L) 04/17/2019     04/17/2019    CO2 28 04/17/2019    BUN 26 04/17/2019    CREATININE 0.90 04/17/2019    CALCIUM 9.5 04/17/2019     Lab Results   Component Value Date    WBC 3.4 (L) 04/17/2019    WBC 5.3 09/14/2015    HGB 10.2 (L) 04/17/2019    HCT 30.8 (L) 04/17/2019    MCV 79 (L) 04/17/2019     04/17/2019     Lab Results   Component Value Date    CHOL 150 04/17/2019    TRIG 66 04/17/2019    HDL 72 04/17/2019     LDL Calculated (mg/dL)   Date  Value   04/17/2019 65   09/17/2018 62   08/23/2018 120     BNP (pg/mL)   Date Value   09/04/2018 358 (H)   08/09/2017 72   03/01/2016 83           Much or all of the text in this note was generated through the use of the Dragon Dictate voice-to-text software. Errors in spelling or words which seem out of context are unintentional. Sound alike errors, in particular, may have escaped editing.

## 2021-06-29 NOTE — PROGRESS NOTES
"Progress Notes by Stacey Cardona MD at 4/24/2020 10:30 AM     Author: Stacey Cardona MD Service: -- Author Type: Physician    Filed: 4/24/2020 10:39 AM Encounter Date: 4/24/2020 Status: Signed    : Stacey Cardona MD (Physician)           The patient has been notified of following:     \"This telephone visit will be conducted via a call between you and your physician/provider. We have found that certain health care needs can be provided without the need for a physical exam.  This service lets us provide the care you need with a phone conversation.  If a prescription is necessary we can send it directly to your pharmacy.  If lab work is needed we can place an order for that and you can then stop by our lab to have the test done at a later time. If during the course of the call the physician/provider feels a telephone visit is not appropriate, you will not be charged for this service.\" Verbal consent has been obtained for this service by care team member:         HEART CARE PHONE ENCOUNTER        The patient has chosen to have the visit conducted as a telephone visit, to reduce risk of exposure given the current status of Coronavirus in our community. This telephone visit is being conducted via a call between the patient and physician/provider. Health care needs are being provided without a physical exam.     Assessment/Recommendations   Assessment:    1. Coronary atherosclerosis due to lipid rich plaque -due to increased shortness of breath she underwent stress testing in August 2018 showing medium-sized area of moderate to severe ischemia involving the mid to distal anteroseptal and anterior wall of the left ventricle with a medium-sized area of distal anteroseptal and anterior transmural scar with ejection fraction of 52%.  This prompted angiography August 23, 2018 showing normal left main, proximal LAD 25% lesion with a totally occluded first diagonal which was intervened upon with a stent, normal " circumflex and normal right coronary artery.  Following this her symptoms are not improved and I doubt that coronary artery disease as the cause of her shortness of breath given that recheck stress test several months ago showed resolution of the ischemia.  On baby aspirin 81 mg a day.     2. Essential hypertension with goal blood pressure less than 140/90 -most recent blood pressure check was 126/76 which is good.  She does complain of some orthostasis and generalized fatigue and being off balance, given this I will back off on amlodipine to 2.5 mg a day.   3. Hypercholesteremia -most recent cholesterol 240 with an LDL of 145, atorvastatin stopped due to side effects.   4. NIELSON (dyspnea on exertion)- chronic and really no better with intervention.  Was seen in the rapid access clinic due to this and still no issues.  Recheck echo no issues and no pulmonary hypertension present, but  repeat VQ scan given history of pulmonary emboli was abnormal so she is on chronic Coumadin therapy.  Could be due to anemia and she is on iron for this and wonder if it would be reasonable to give her transfusion. Stopped atorvastatin and breathing never got any better either.  Suspect this is a chronic problem.   5. Chronic pulmonary embolism without acute cor pulmonale, unspecified pulmonary embolism type (H) -so noted with most recent INR 2.7.       Plan:  1.  Decrease amlodipine to 2.5 mg a day, she will call if there are any major side effects.    Follow Up Plan: Follow up in 6 months  I have reviewed the note as documented.  This accurately captures the substance of my conversation with the patient.    Total time of call between patient and provider was 16 minutes   Start Time: 1014  Stop Time: 1030       History of Present Illness/Subjective    Jaclyn Gibbs is a 87 y.o. female who is being evaluated via a billable telephone visit.    87-year-old female, living independently with her , for phone call follow-up due  to coronavirus pandemic.  She still has shortness of breath with minimal activity such as walking from room to room, no change with any of our interventions.  She complains of very weak legs, needing to use a cane at home as well as a walker outside.  She tells me that she feels very unsteady, feels lightheaded when she stands up quickly and feels like her balance is off.  There is no PND, orthopnea, true syncope, chest pains, palpitations or significant peripheral edema.    I have reviewed and updated the patient's Past Medical History, Social History, Family History and Medication List.     Physical Examination not performed given phone encounter Review of Systems      Review of Systems - History obtained from the patient  General ROS: negative  Psychological ROS: negative  Ophthalmic ROS: negative  ENT ROS: negative  Hematological and Lymphatic ROS: negative  Respiratory ROS: positive for - shortness of breath  Cardiovascular ROS: positive for - irregular heartbeat and shortness of breath of activity  Gastrointestinal ROS: negative  Genito-Urinary ROS: positive for - frequent urination at night   Musculoskeletal ROS: positive for - joint pain, muscle weakness and muscle pain  Neurological ROS: positive for - loss of balance and daytime sleepiness  Dermatological ROS: negative                                         Medical History  Surgical History Family History Social History   Past Medical History:   Diagnosis Date   ? Acute pancreatitis 04/2013   ? Anemia 04/17/2019   ? Basal cell carcinoma    ? Basal Cell Carcinoma Of The Skin Of The Face     Created by Conversion  Replacement Utility updated for latest IMO load   ? Carpal tunnel syndrome    ? Coronary artery disease    ? CVA (cerebral vascular accident) (H) 12/11/2009    Posterior limb right internal capsule ischemic stroke.   ? DVT left leg 2008 after TKA     Created by Conversion [facility] Annotation: Dec 22 2008 [id number] - Pancho Banks: left leg,   08. S/P L knee replacement 08.  Replacement Utility updated for latest IMO load   ? GERD (gastroesophageal reflux disease)    ? History of pulmonary embolism 2019   ? Hyperlipidemia    ? Hypertension    ? Lacunar Stroke     Created by [facility] Annotation: 2010  3:02PM - Pancho Banks: right,  2009.  Replacement Utility updated for latest IMO load   ? Lumbar spondylosis    ? Osteoarthritis    ? Osteopenia 2016   ? Overactive bladder    ? Paroxysmal supraventricular tachycardia (H)    ? Peroneal DVT (deep venous thrombosis), left 2008    after left knee replacement. Was on warfarin for some time.   ? Personal history of DVT (deep vein thrombosis) 2019   ? Pulmonary embolism on right (H) 2017   ? Scoliosis    ? Scoliosis of thoracolumbar spine 2017   ? Spinal stenosis of lumbar region with radiculopathy, L5 to S1 2016   ? Vertigo     Past Surgical History:   Procedure Laterality Date   ? APPENDECTOMY     ? BASAL CELL CARCINOMA EXCISION      upper back and left chin   ? CARDIAC CATHETERIZATION     ? CATARACT EXTRACTION Bilateral    ?  SECTION  1970   ? CORONARY ANGIOPLASTY  2010    stent placed to the first diagonal   ? CV CORONARY ANGIOGRAM N/A 2018    Procedure: Coronary Angiogram;  Surgeon: Christopher Mendes MD;  Location: [facility] Cath Lab;  Service:    ? CV LEFT HEART CATHETERIZATION WO LEFT VETRICULOGRAM Left 2018    Procedure: Left Heart Catheterization Without Left Ventriculogram;  Surgeon: Christopher Mendes MD;  Location: [facility] Cath Lab;  Service:    ? LUMBAR LAMINECTOMY  2009    L4-L5.   ? IL ABLATE HEART DYSRHYTHM FOCUS  2014    Description: Catheter Ablation Atrial Supraventricular Tachycardia;  Comments: typical AVNRT,  successful cryoablation   ? TOTAL KNEE ARTHROPLASTY Right 2003   ? TOTAL KNEE ARTHROPLASTY Left 2008    Family History   Problem Relation Age of Onset   ?  Stroke Mother 88   ? Hypertension Father    ? Sudden death Father 70        suspected heart attack    ? Breast cancer Sister    ? Venous thrombosis Sister 80        At age 80, and had breast CA   ? Cirrhosis Sister 34   ? Aneurysm Sister    ? Other Brother 25        Killed in WWII.   ? Brain cancer Daughter 5   ? No Medical Problems Daughter    ? No Medical Problems Son    ? No Medical Problems Son    ? No Medical Problems Son    ? Dementia Sister     Social History     Socioeconomic History   ? Marital status:      Spouse name: Not on file   ? Number of children: 6   ? Years of education: Not on file   ? Highest education level: Not on file   Occupational History     Employer: RETIRED     Comment:  before she retired.Retired at 72.   Social Needs   ? Financial resource strain: Not on file   ? Food insecurity     Worry: Not on file     Inability: Not on file   ? Transportation needs     Medical: Not on file     Non-medical: Not on file   Tobacco Use   ? Smoking status: Never Smoker   ? Smokeless tobacco: Never Used   Substance and Sexual Activity   ? Alcohol use: No   ? Drug use: No   ? Sexual activity: Not on file   Lifestyle   ? Physical activity     Days per week: Not on file     Minutes per session: Not on file   ? Stress: Not on file   Relationships   ? Social connections     Talks on phone: Not on file     Gets together: Not on file     Attends Jewish service: Not on file     Active member of club or organization: Not on file     Attends meetings of clubs or organizations: Not on file     Relationship status: Not on file   ? Intimate partner violence     Fear of current or ex partner: Not on file     Emotionally abused: Not on file     Physically abused: Not on file     Forced sexual activity: Not on file   Other Topics Concern   ? Not on file   Social History Narrative    , 6 children, active, is a full code.  (last updated 8/9/2017)           Medications  Allergies   Current  Outpatient Medications   Medication Sig Dispense Refill   ? amLODIPine (NORVASC) 5 MG tablet Take 1 tablet (5 mg total) by mouth daily. 90 tablet 2   ? CALCIUM CARB/MAGNESIUM OXID/D3 (CALCIUM MAGNESIUM + D ORAL) Take by mouth 2 (two) times a day. 750/300/500mg     ? GLUCOSAMINE/CHONDROITIN SULF A (GLUCOSAMINE-CHONDROITIN ORAL) Take 2 tablets by mouth daily.     ? indapamide (LOZOL) 2.5 MG tablet TAKE 1 TABLET EVERY DAY 90 tablet 3   ? losartan (COZAAR) 50 MG tablet Take 1 tablet (50 mg total) by mouth daily. 90 tablet 3   ? multivitamin (ONE A DAY) per tablet Take 1 tablet by mouth daily.     ? nitroglycerin (NITROSTAT) 0.4 MG SL tablet Place 1 tablet (0.4 mg total) under the tongue as needed for chest pain (Keep in original bottle). 45 tablet 6   ? oxybutynin (DITROPAN XL) 5 MG ER tablet Take 1 tablet (5 mg total) by mouth daily. 90 tablet 3   ? potassium chloride (K-DUR,KLOR-CON) 10 MEQ tablet Take 1 tablet (10 mEq total) by mouth daily. 90 tablet 3   ? warfarin ANTICOAGULANT (COUMADIN/JANTOVEN) 5 MG tablet Take 1 tablet (5mg) to 1 &1/2 tablets (7.5mg) by mouth daily, as directed.  Adjust dose based on INR results. 110 tablet 1     No current facility-administered medications for this visit.     Allergies   Allergen Reactions   ? Oxycodone Nausea And Vomiting   ? Codeine Nausea And Vomiting   ? Diazepam Nausea Only   ? Morphine Nausea And Vomiting         Lab Results    Chemistry/lipid CBC Cardiac Enzymes/BNP/TSH/INR   Lab Results   Component Value Date    CHOL 240 (H) 01/27/2020    HDL 72 01/27/2020    LDLCALC 145 (H) 01/27/2020    TRIG 114 01/27/2020    CREATININE 0.89 01/27/2020    BUN 21 01/27/2020    K 3.6 01/27/2020     01/27/2020     01/27/2020    CO2 27 01/27/2020    Lab Results   Component Value Date    WBC 4.1 01/27/2020    HGB 12.6 01/27/2020    HCT 37.8 01/27/2020    MCV 87 01/27/2020     01/27/2020    Lab Results   Component Value Date    CKTOTAL 172 09/04/2018    CKMB 3 02/11/2011     TROPONINI 0.02 06/27/2019     (H) 07/22/2019    TSH 4.04 01/27/2020    INR 2.70 (H) 04/20/2020        Stacey Cardona

## 2021-07-03 NOTE — ADDENDUM NOTE
Addendum Note by Naomy Etienne RN at 10/2/2017  3:13 PM     Author: Naomy Etienne RN Service: -- Author Type: Registered Nurse    Filed: 10/2/2017  3:13 PM Encounter Date: 10/2/2017 Status: Signed    : Naomy Etienne RN (Registered Nurse)    Addended by: NAOMY ETIENNE on: 10/2/2017 03:13 PM        Modules accepted: Orders

## 2021-07-03 NOTE — ADDENDUM NOTE
Addendum Note by Courtney Frank DO at 12/2/2019 10:40 AM     Author: Courtney Frank DO Service: -- Author Type: Physician    Filed: 12/2/2019  1:55 PM Date of Service: 12/2/2019 10:40 AM Status: Signed    : Courtney Frank DO (Physician)    Encounter addended by: Courtney Frank DO on: 12/2/2019  1:55   PM      Actions taken: Clinical Note Signed

## 2021-07-03 NOTE — ADDENDUM NOTE
Addendum Note by Naomy Etienne RN at 7/25/2019  4:03 PM     Author: Naomy Etienne RN Service: -- Author Type: Registered Nurse    Filed: 7/25/2019  4:03 PM Encounter Date: 7/25/2019 Status: Signed    : Naomy Etienne RN (Registered Nurse)    Addended by: NAOMY ETIENNE on: 7/25/2019 04:03 PM        Modules accepted: Orders

## 2021-07-14 PROBLEM — I10 ESSENTIAL HYPERTENSION: Status: RESOLVED | Noted: 2017-09-18 | Resolved: 2018-04-11

## 2021-07-14 PROBLEM — K62.5 BRBPR (BRIGHT RED BLOOD PER RECTUM): Status: RESOLVED | Noted: 2021-01-01 | Resolved: 2021-01-01

## 2021-08-03 PROBLEM — I82.409 ACUTE EMBOLISM AND THROMBOSIS OF UNSPECIFIED DEEP VEINS OF UNSPECIFIED LOWER EXTREMITY (H): Status: RESOLVED | Noted: 2021-01-01 | Resolved: 2021-01-01

## 2021-08-03 PROBLEM — I26.99 OTHER ACUTE PULMONARY EMBOLISM WITHOUT ACUTE COR PULMONALE (H): Status: RESOLVED | Noted: 2017-08-18 | Resolved: 2019-07-22

## 2022-01-09 NOTE — PATIENT INSTRUCTIONS - HE
DISCHARGE INSTRUCTIONS    During office hours (8:00 a.m.- 4:30 p.m.) questions or concerns may be answered  by calling Spine Navigation Nurses at  [phone].     If you experience any problems after hours  please call [phone] and you will be connected to [facility].     All Patients:    ? You may experience an increase in your symptoms for the first 2 days (It may take anywhere between 2 days- 2 weeks for the steroid to have maximum effect).    ? You may use ice on the injection site, as frequently as 20 minutes each hour if needed.    ? You may take your pain medicine.    ? You may continue taking your regular medication after your injection. If you have had a Medial Branch Block you may resume pain medication once your pain diary is completed.    ? You may shower. No swimming, tub bath or hot tub for 48 hours.  You may remove your bandaid/bandage as soon as you are home.    ? You may resume light activities, as tolerated.    ? Resume your usual diet as tolerated.    ? It is strongly advised that you do not drive for 1-3 hours post injection.    ? If you have had oral sedation:  Do not drive for 8 hours post injection.      ? If you have had IV sedation:  Do not drive for 24 hours post injection.  Do not operate hazardous machinery or make important personal/business decisions for 24 hours.      POSSIBLE STEROID SIDE EFFECTS (If steroid/cortisone was used for your procedure)    -If you experience these symptoms, it should only last for a short period      Swelling of the legs                Skin redness (flushing)       Mouth (oral) irritation     Blood sugar (glucose) levels              Sweats                      Mood changes    Headache    Sleeplessness         POSSIBLE PROCEDURE SIDE EFFECTS  -Call the Spine Center if you are concerned    Increased Pain             Increased numbness/tingling        Nausea/Vomiting            Bruising/bleeding at site        Redness or swelling                                                 Difficulty walking        Weakness             Fever greater than 100.5    *In the event of a severe headache after an epidural steroid injection that is relieved by lying down, please call the [facility] to speak with a clinical staff member*     Statement Selected

## 2024-12-31 NOTE — TELEPHONE ENCOUNTER
FYI - Status Update  Who is Calling: Patient  Update: Patient calling after receiving a voicemail, but not sure when it was from. Patient not sure if they need to speak with ACN. Please call the patient back if necessary, thank you.  Okay to leave a detailed message?:  No return call needed. Unless the ACN needs to speak with the patient     Plan:  Current medications reviewed.  Refills given as warranted.  2.   Do not take ibuprofen scheduled. Make sure you have eaten of you take it  3.   Smoking cessation encouraged   4.   Repeat echocardiogram for systolic murmur and aortic valve    ~Call [facility] scheduling at [phone] to schedule testing    5.    Follow up in 6 months